# Patient Record
Sex: MALE | Race: WHITE | Employment: OTHER | URBAN - NONMETROPOLITAN AREA
[De-identification: names, ages, dates, MRNs, and addresses within clinical notes are randomized per-mention and may not be internally consistent; named-entity substitution may affect disease eponyms.]

---

## 2017-03-27 ENCOUNTER — HOSPITAL ENCOUNTER (OUTPATIENT)
Age: 62
Discharge: HOME | End: 2017-03-27
Payer: COMMERCIAL

## 2017-03-27 LAB
ABSOLUTE EOS #: 0.2 K/UL (ref 0–0.4)
ABSOLUTE LYMPH #: 1 K/UL (ref 1–4.8)
ABSOLUTE MONO #: 0.4 K/UL (ref 0–1)
ALBUMIN SERPL-MCNC: 4.2 G/DL (ref 3.5–5.2)
ALBUMIN/GLOBULIN RATIO: 1.7 (ref 1–2.5)
ALP BLD-CCNC: 80 U/L (ref 40–129)
ALT SERPL-CCNC: 54 U/L (ref 5–41)
ANION GAP SERPL CALCULATED.3IONS-SCNC: 11 MMOL/L (ref 9–17)
AST SERPL-CCNC: 42 U/L
BASOPHILS # BLD: 1 % (ref 0–2)
BASOPHILS ABSOLUTE: 0 K/UL (ref 0–0.2)
BILIRUB SERPL-MCNC: 0.69 MG/DL (ref 0.3–1.2)
BUN BLDV-MCNC: 10 MG/DL (ref 8–23)
BUN/CREAT BLD: 13 (ref 9–20)
CALCIUM SERPL-MCNC: 9.2 MG/DL (ref 8.6–10.4)
CHLORIDE BLD-SCNC: 100 MMOL/L (ref 98–107)
CHOLESTEROL/HDL RATIO: 2.9
CHOLESTEROL: 160 MG/DL
CO2: 29 MMOL/L (ref 20–31)
CREAT SERPL-MCNC: 0.75 MG/DL (ref 0.7–1.2)
DIFFERENTIAL TYPE: ABNORMAL
EOSINOPHILS RELATIVE PERCENT: 3 % (ref 0–8)
GFR AFRICAN AMERICAN: >60 ML/MIN
GFR NON-AFRICAN AMERICAN: >60 ML/MIN
GFR SERPL CREATININE-BSD FRML MDRD: ABNORMAL ML/MIN/{1.73_M2}
GFR SERPL CREATININE-BSD FRML MDRD: ABNORMAL ML/MIN/{1.73_M2}
GLUCOSE BLD-MCNC: 117 MG/DL (ref 70–99)
HCT VFR BLD CALC: 44.5 % (ref 41–53)
HDLC SERPL-MCNC: 56 MG/DL
HEMOGLOBIN: 14.9 G/DL (ref 13.5–17)
LDL CHOLESTEROL: 92 MG/DL (ref 0–130)
LYMPHOCYTES # BLD: 20 % (ref 24–44)
MCH RBC QN AUTO: 31 PG (ref 26–34)
MCHC RBC AUTO-ENTMCNC: 33.4 G/DL (ref 31–37)
MCV RBC AUTO: 93 FL (ref 80–100)
MONOCYTES # BLD: 8 % (ref 0–12)
PDW BLD-RTO: 14.3 % (ref 12.1–15.2)
PLATELET # BLD: 181 K/UL (ref 140–450)
PLATELET ESTIMATE: ABNORMAL
PMV BLD AUTO: 8.2 FL (ref 6–12)
POTASSIUM SERPL-SCNC: 3.7 MMOL/L (ref 3.7–5.3)
PROSTATE SPECIFIC ANTIGEN: 5.7 UG/L
RBC # BLD: 4.79 M/UL (ref 4.5–5.9)
RBC # BLD: ABNORMAL 10*6/UL
SEG NEUTROPHILS: 68 % (ref 36–66)
SEGMENTED NEUTROPHILS ABSOLUTE COUNT: 3.4 K/UL (ref 1.8–7.7)
SODIUM BLD-SCNC: 140 MMOL/L (ref 135–144)
TOTAL PROTEIN: 6.7 G/DL (ref 6.4–8.3)
TRIGL SERPL-MCNC: 59 MG/DL
VLDLC SERPL CALC-MCNC: NORMAL MG/DL (ref 1–30)
WBC # BLD: 5 K/UL (ref 3.5–11)
WBC # BLD: ABNORMAL 10*3/UL

## 2017-03-27 PROCEDURE — 84153 ASSAY OF PSA TOTAL: CPT

## 2017-03-27 PROCEDURE — 80061 LIPID PANEL: CPT

## 2017-03-27 PROCEDURE — 85025 COMPLETE CBC W/AUTO DIFF WBC: CPT

## 2017-03-27 PROCEDURE — 80053 COMPREHEN METABOLIC PANEL: CPT

## 2017-03-27 PROCEDURE — 36415 COLL VENOUS BLD VENIPUNCTURE: CPT

## 2017-04-17 ENCOUNTER — OFFICE VISIT (OUTPATIENT)
Dept: UROLOGY | Age: 62
End: 2017-04-17
Payer: COMMERCIAL

## 2017-04-17 VITALS
DIASTOLIC BLOOD PRESSURE: 74 MMHG | SYSTOLIC BLOOD PRESSURE: 118 MMHG | HEIGHT: 69 IN | BODY MASS INDEX: 39.84 KG/M2 | WEIGHT: 269 LBS

## 2017-04-17 DIAGNOSIS — R97.20 ELEVATED PSA: Primary | ICD-10-CM

## 2017-04-17 PROCEDURE — 99214 OFFICE O/P EST MOD 30 MIN: CPT | Performed by: UROLOGY

## 2017-04-17 RX ORDER — CIPROFLOXACIN 500 MG/1
500 TABLET, FILM COATED ORAL 2 TIMES DAILY
Qty: 28 TABLET | Refills: 0 | Status: SHIPPED | OUTPATIENT
Start: 2017-04-17 | End: 2017-05-01

## 2017-04-17 ASSESSMENT — ENCOUNTER SYMPTOMS
EYE PAIN: 0
ABDOMINAL PAIN: 0
WHEEZING: 0
BACK PAIN: 0
VOMITING: 0
COUGH: 0
SHORTNESS OF BREATH: 0
COLOR CHANGE: 0
NAUSEA: 0
EYE REDNESS: 0

## 2017-05-04 ENCOUNTER — HOSPITAL ENCOUNTER (OUTPATIENT)
Age: 62
Discharge: HOME | End: 2017-05-04
Payer: COMMERCIAL

## 2017-05-04 DIAGNOSIS — R97.20 ELEVATED PSA: ICD-10-CM

## 2017-05-04 LAB — PROSTATE SPECIFIC ANTIGEN: 4.72 UG/L

## 2017-05-04 PROCEDURE — 84153 ASSAY OF PSA TOTAL: CPT

## 2017-05-04 PROCEDURE — 36415 COLL VENOUS BLD VENIPUNCTURE: CPT

## 2017-05-11 ENCOUNTER — OFFICE VISIT (OUTPATIENT)
Dept: UROLOGY | Age: 62
End: 2017-05-11
Payer: COMMERCIAL

## 2017-05-11 VITALS
SYSTOLIC BLOOD PRESSURE: 122 MMHG | HEIGHT: 69 IN | BODY MASS INDEX: 39.69 KG/M2 | WEIGHT: 268 LBS | DIASTOLIC BLOOD PRESSURE: 80 MMHG

## 2017-05-11 DIAGNOSIS — R35.1 NOCTURIA: ICD-10-CM

## 2017-05-11 DIAGNOSIS — R97.20 ELEVATED PSA: Primary | ICD-10-CM

## 2017-05-11 PROCEDURE — 99213 OFFICE O/P EST LOW 20 MIN: CPT | Performed by: UROLOGY

## 2017-05-11 ASSESSMENT — ENCOUNTER SYMPTOMS
NAUSEA: 0
EYE PAIN: 0
EYE REDNESS: 0
COLOR CHANGE: 0
BACK PAIN: 0
COUGH: 0
WHEEZING: 0
ABDOMINAL PAIN: 0
VOMITING: 0
SHORTNESS OF BREATH: 0

## 2017-08-07 ENCOUNTER — HOSPITAL ENCOUNTER (OUTPATIENT)
Age: 62
Discharge: HOME | End: 2017-08-07
Payer: COMMERCIAL

## 2017-08-07 LAB
ABSOLUTE EOS #: 0.2 K/UL (ref 0–0.4)
ABSOLUTE LYMPH #: 1.2 K/UL (ref 1–4.8)
ABSOLUTE MONO #: 0.4 K/UL (ref 0.2–0.8)
ALBUMIN SERPL-MCNC: 4.1 G/DL (ref 3.5–5.2)
ALBUMIN/GLOBULIN RATIO: 1.6 (ref 1–2.5)
ALP BLD-CCNC: 72 U/L (ref 40–129)
ALT SERPL-CCNC: 41 U/L (ref 5–41)
ANION GAP SERPL CALCULATED.3IONS-SCNC: 12 MMOL/L (ref 9–17)
AST SERPL-CCNC: 31 U/L
BASOPHILS # BLD: 2 %
BASOPHILS ABSOLUTE: 0.1 K/UL (ref 0–0.2)
BILIRUB SERPL-MCNC: 0.77 MG/DL (ref 0.3–1.2)
BUN BLDV-MCNC: 11 MG/DL (ref 8–23)
BUN/CREAT BLD: 15 (ref 9–20)
CALCIUM SERPL-MCNC: 9.4 MG/DL (ref 8.6–10.4)
CHLORIDE BLD-SCNC: 99 MMOL/L (ref 98–107)
CHOLESTEROL/HDL RATIO: 2.7
CHOLESTEROL: 150 MG/DL
CO2: 27 MMOL/L (ref 20–31)
CREAT SERPL-MCNC: 0.74 MG/DL (ref 0.7–1.2)
DIFFERENTIAL TYPE: NORMAL
EOSINOPHILS RELATIVE PERCENT: 4 %
ESTIMATED AVERAGE GLUCOSE: 111 MG/DL
GFR AFRICAN AMERICAN: >60 ML/MIN
GFR NON-AFRICAN AMERICAN: >60 ML/MIN
GFR SERPL CREATININE-BSD FRML MDRD: ABNORMAL ML/MIN/{1.73_M2}
GFR SERPL CREATININE-BSD FRML MDRD: ABNORMAL ML/MIN/{1.73_M2}
GLUCOSE BLD-MCNC: 112 MG/DL (ref 70–99)
HBA1C MFR BLD: 5.5 % (ref 4.8–5.9)
HCT VFR BLD CALC: 45.3 % (ref 41–53)
HDLC SERPL-MCNC: 56 MG/DL
HEMOGLOBIN: 15.1 G/DL (ref 13.5–17)
LDL CHOLESTEROL: 77 MG/DL (ref 0–130)
LYMPHOCYTES # BLD: 26 %
MCH RBC QN AUTO: 30.5 PG (ref 26–34)
MCHC RBC AUTO-ENTMCNC: 33.3 G/DL (ref 31–37)
MCV RBC AUTO: 91.4 FL (ref 80–100)
MONOCYTES # BLD: 9 %
PDW BLD-RTO: 13.2 % (ref 12.1–15.2)
PLATELET # BLD: 189 K/UL (ref 140–450)
PLATELET ESTIMATE: NORMAL
PMV BLD AUTO: 8.2 FL (ref 6–12)
POTASSIUM SERPL-SCNC: 3.8 MMOL/L (ref 3.7–5.3)
RBC # BLD: 4.95 M/UL (ref 4.5–5.9)
RBC # BLD: NORMAL 10*6/UL
SEG NEUTROPHILS: 59 %
SEGMENTED NEUTROPHILS ABSOLUTE COUNT: 2.6 K/UL (ref 1.8–7.7)
SODIUM BLD-SCNC: 138 MMOL/L (ref 135–144)
TOTAL PROTEIN: 6.6 G/DL (ref 6.4–8.3)
TRIGL SERPL-MCNC: 85 MG/DL
VLDLC SERPL CALC-MCNC: NORMAL MG/DL (ref 1–30)
WBC # BLD: 4.5 K/UL (ref 3.5–11)
WBC # BLD: NORMAL 10*3/UL

## 2017-08-07 PROCEDURE — 36415 COLL VENOUS BLD VENIPUNCTURE: CPT

## 2017-08-07 PROCEDURE — 85025 COMPLETE CBC W/AUTO DIFF WBC: CPT

## 2017-08-07 PROCEDURE — 80053 COMPREHEN METABOLIC PANEL: CPT

## 2017-08-07 PROCEDURE — 83036 HEMOGLOBIN GLYCOSYLATED A1C: CPT

## 2017-08-07 PROCEDURE — 80061 LIPID PANEL: CPT

## 2017-11-09 ENCOUNTER — HOSPITAL ENCOUNTER (OUTPATIENT)
Age: 62
Discharge: HOME | End: 2017-11-09
Payer: COMMERCIAL

## 2017-11-09 DIAGNOSIS — R97.20 ELEVATED PSA: ICD-10-CM

## 2017-11-09 LAB — PROSTATE SPECIFIC ANTIGEN: 3.54 UG/L

## 2017-11-09 PROCEDURE — 84153 ASSAY OF PSA TOTAL: CPT

## 2017-11-09 PROCEDURE — 36415 COLL VENOUS BLD VENIPUNCTURE: CPT

## 2017-11-13 ENCOUNTER — OFFICE VISIT (OUTPATIENT)
Dept: UROLOGY | Age: 62
End: 2017-11-13
Payer: COMMERCIAL

## 2017-11-13 VITALS
HEIGHT: 69 IN | BODY MASS INDEX: 38.66 KG/M2 | WEIGHT: 261 LBS | SYSTOLIC BLOOD PRESSURE: 124 MMHG | DIASTOLIC BLOOD PRESSURE: 78 MMHG

## 2017-11-13 DIAGNOSIS — R97.20 ELEVATED PSA: Primary | ICD-10-CM

## 2017-11-13 PROCEDURE — 99213 OFFICE O/P EST LOW 20 MIN: CPT | Performed by: NURSE PRACTITIONER

## 2017-11-13 ASSESSMENT — ENCOUNTER SYMPTOMS
CONSTIPATION: 0
VOMITING: 0
SHORTNESS OF BREATH: 0
NAUSEA: 0
BACK PAIN: 0
COLOR CHANGE: 0
COUGH: 0
ABDOMINAL PAIN: 0
EYE REDNESS: 0
EYE PAIN: 0
WHEEZING: 0

## 2017-11-13 NOTE — PROGRESS NOTES
Subjective:      Patient ID: Sonia Rosado is a 57 y.o. male. HPI  Patient is a 57 y.o. male in no acute distress and alert and oriented to person, place and time. History  Referred for elevated PSA 5.35 on 1/6/16. 1 week prior to getting the PSA he fell and broke his tailbone. Has no family history of prostate cancer. Has minimal LUTS - only nocturia x 1 and this is not bothersome. PSA  3.54 - 11/2017  4.72 - 5/2017  5.70 - 3/2017  3.67 - 5/2016  5.35 - 1/2016  1.71 - 9/2013      Today  Here today for a 6 month follow-up for history of elevated PSA. He did have a PSA completed on 11/9/17 was 3.54. This has decreased from 4.72 6 months ago. He denies unintentional weight loss, decreased energy or appetite, night sweats, new or worsening bone/hip/back pain. He has minimal LUTS. Denies dysuria or gross hematuria. IPSS Questionnaire (AUA-7): Over the past month    1)  How often have you had a sensation of not emptying your bladder completely after you finish urinating? 0 - Not at all   2)  How often have you had to urinate again less than two hours after you finished urinating? 0 - Not at all   3)  How often have you found you stopped and started again several times when you urinated? 1 - Less than 1 time in 5   4) How difficult have you found it to postpone urination? 1 - Less than 1 time in 5   5) How often have you had a weak urinary stream?  0 - Not at all   6) How often have you had to push or strain to begin urination? 0 - Not at all   7) How many times did you most typically get up to urinate from the time you went to bed until the time you got up in the morning?   1 - 1 time   Total 3   QOL 0       Past Medical History:   Diagnosis Date    CAD (coronary artery disease)     H/O blood clots     25 yrs ago; R lower leg    Hyperlipidemia     Hypertension      Past Surgical History:   Procedure Laterality Date    CARDIAC SURGERY      4 stents placed    KNEE SURGERY      SHOULDER SURGERY Family History   Problem Relation Age of Onset    Kidney Disease Father      Current Outpatient Prescriptions on File Prior to Visit   Medication Sig Dispense Refill    lisinopril (PRINIVIL;ZESTRIL) 10 MG tablet Take 10 mg by mouth daily      atorvastatin (LIPITOR) 80 MG tablet Take 80 mg by mouth daily      hydrochlorothiazide (HYDRODIURIL) 25 MG tablet Take 25 mg by mouth daily.  clopidogrel (PLAVIX) 75 MG tablet Take 75 mg by mouth daily.  aspirin 81 MG EC tablet Take 81 mg by mouth daily.  nitroGLYCERIN (NITROLINGUAL) 0.4 MG/SPRAY spray Place 2 sprays under the tongue every 5 minutes as needed.  albuterol (PROVENTIL HFA;VENTOLIN HFA) 108 (90 BASE) MCG/ACT inhaler Inhale 2 puffs into the lungs every 6 hours as needed for Wheezing or Shortness of Breath for 30 days. 1 Inhaler 11     No current facility-administered medications on file prior to visit. Outpatient Encounter Prescriptions as of 11/13/2017   Medication Sig Dispense Refill    lisinopril (PRINIVIL;ZESTRIL) 10 MG tablet Take 10 mg by mouth daily      atorvastatin (LIPITOR) 80 MG tablet Take 80 mg by mouth daily      hydrochlorothiazide (HYDRODIURIL) 25 MG tablet Take 25 mg by mouth daily.  clopidogrel (PLAVIX) 75 MG tablet Take 75 mg by mouth daily.  aspirin 81 MG EC tablet Take 81 mg by mouth daily.  nitroGLYCERIN (NITROLINGUAL) 0.4 MG/SPRAY spray Place 2 sprays under the tongue every 5 minutes as needed.  albuterol (PROVENTIL HFA;VENTOLIN HFA) 108 (90 BASE) MCG/ACT inhaler Inhale 2 puffs into the lungs every 6 hours as needed for Wheezing or Shortness of Breath for 30 days. 1 Inhaler 11     No facility-administered encounter medications on file as of 11/13/2017. Review of patient's allergies indicates no known allergies.   History   Smoking Status    Former Smoker   Smokeless Tobacco    Never Used       History   Alcohol Use    Yes     Comment: occas       Vitals:    11/13/17 0957   BP: 124/78       Constitutional: Patient in no acute distress; Neuro: alert and oriented to person place and time. Psych: Mood and affect normal.  Skin: Normal  Lungs: Respiratory effort normal  Cardiovascular:  Normal peripheral pulses  Abdomen: Soft, non-tender, non-distended with no CVA, flank pain, hepatosplenomegaly or hernia. Kidneys normal.  Bladder non-tender and not distended. Lymphatics: no palpable lymphadenopathy  Penis normal  Urethral meatus normal  Scrotal exam normal  Testicles normal bilaterally      Lab Results   Component Value Date    PSA 3.54 11/09/2017    PSA 4.72 (H) 05/04/2017    PSA 5.70 (H) 03/27/2017     Lab Results   Component Value Date    BUN 11 08/07/2017     Lab Results   Component Value Date    CREATININE 0.74 08/07/2017       No results found for this visit on 11/13/17. Review of Systems   Constitutional: Negative for appetite change, chills and fever. Eyes: Negative for pain, redness and visual disturbance. Respiratory: Negative for cough, shortness of breath and wheezing. Cardiovascular: Negative for chest pain and leg swelling. Gastrointestinal: Negative for abdominal pain, constipation, nausea and vomiting. Genitourinary: Negative for decreased urine volume, difficulty urinating, discharge, dysuria, enuresis, flank pain, frequency, hematuria, penile pain, scrotal swelling, testicular pain and urgency. Musculoskeletal: Negative for back pain, joint swelling and myalgias. Skin: Negative for color change, rash and wound. Neurological: Negative for dizziness, tremors and numbness. Hematological: Negative for adenopathy. Does not bruise/bleed easily. Objective:   Physical Exam    Assessment:       1. Elevated PSA  PSA, Diagnostic    PSA, Diagnostic           Plan: We will repeat a PSA in 6 months, and we will call him with these results. If the PSA is elevated again we will treat with a course of antibiotics then repeat the PSA.   He will follow up with us in the office in 1 year with a PSA prior.

## 2018-02-06 ENCOUNTER — HOSPITAL ENCOUNTER (OUTPATIENT)
Age: 63
Discharge: HOME | End: 2018-02-06
Payer: COMMERCIAL

## 2018-02-06 LAB
ABSOLUTE EOS #: 0.2 K/UL (ref 0–0.4)
ABSOLUTE IMMATURE GRANULOCYTE: NORMAL K/UL (ref 0–0.3)
ABSOLUTE LYMPH #: 1.4 K/UL (ref 1–4.8)
ABSOLUTE MONO #: 0.4 K/UL (ref 0–1)
ALBUMIN SERPL-MCNC: 4.3 G/DL (ref 3.5–5.2)
ALBUMIN/GLOBULIN RATIO: 1.7 (ref 1–2.5)
ALP BLD-CCNC: 70 U/L (ref 40–129)
ALT SERPL-CCNC: 46 U/L (ref 5–41)
ANION GAP SERPL CALCULATED.3IONS-SCNC: 14 MMOL/L (ref 9–17)
AST SERPL-CCNC: 28 U/L
BASOPHILS # BLD: 1 % (ref 0–2)
BASOPHILS ABSOLUTE: 0 K/UL (ref 0–0.2)
BILIRUB SERPL-MCNC: 0.55 MG/DL (ref 0.3–1.2)
BUN BLDV-MCNC: 12 MG/DL (ref 8–23)
BUN/CREAT BLD: 17 (ref 9–20)
CALCIUM SERPL-MCNC: 9.6 MG/DL (ref 8.6–10.4)
CHLORIDE BLD-SCNC: 97 MMOL/L (ref 98–107)
CHOLESTEROL/HDL RATIO: 3.3
CHOLESTEROL: 157 MG/DL
CO2: 29 MMOL/L (ref 20–31)
CREAT SERPL-MCNC: 0.72 MG/DL (ref 0.7–1.2)
DIFFERENTIAL TYPE: NORMAL
EOSINOPHILS RELATIVE PERCENT: 3 % (ref 0–8)
ESTIMATED AVERAGE GLUCOSE: 117 MG/DL
GFR AFRICAN AMERICAN: >60 ML/MIN
GFR NON-AFRICAN AMERICAN: >60 ML/MIN
GFR SERPL CREATININE-BSD FRML MDRD: ABNORMAL ML/MIN/{1.73_M2}
GFR SERPL CREATININE-BSD FRML MDRD: ABNORMAL ML/MIN/{1.73_M2}
GLUCOSE BLD-MCNC: 99 MG/DL (ref 70–99)
HBA1C MFR BLD: 5.7 % (ref 4.8–5.9)
HCT VFR BLD CALC: 47.2 % (ref 41–53)
HDLC SERPL-MCNC: 48 MG/DL
HEMOGLOBIN: 15.6 G/DL (ref 13.5–17)
IMMATURE GRANULOCYTES: NORMAL %
LDL CHOLESTEROL: 78 MG/DL (ref 0–130)
LYMPHOCYTES # BLD: 26 % (ref 24–44)
MCH RBC QN AUTO: 30.9 PG (ref 26–34)
MCHC RBC AUTO-ENTMCNC: 33.2 G/DL (ref 31–37)
MCV RBC AUTO: 93.1 FL (ref 80–100)
MONOCYTES # BLD: 8 % (ref 0–12)
NRBC AUTOMATED: NORMAL PER 100 WBC
PDW BLD-RTO: 14.2 % (ref 12.1–15.2)
PLATELET # BLD: 191 K/UL (ref 140–450)
PLATELET ESTIMATE: NORMAL
PMV BLD AUTO: 8.2 FL (ref 6–12)
POTASSIUM SERPL-SCNC: 4.3 MMOL/L (ref 3.7–5.3)
RBC # BLD: 5.07 M/UL (ref 4.5–5.9)
RBC # BLD: NORMAL 10*6/UL
SEG NEUTROPHILS: 62 % (ref 36–66)
SEGMENTED NEUTROPHILS ABSOLUTE COUNT: 3.2 K/UL (ref 1.8–7.7)
SODIUM BLD-SCNC: 140 MMOL/L (ref 135–144)
TOTAL PROTEIN: 6.8 G/DL (ref 6.4–8.3)
TRIGL SERPL-MCNC: 157 MG/DL
VLDLC SERPL CALC-MCNC: ABNORMAL MG/DL (ref 1–30)
WBC # BLD: 5.2 K/UL (ref 3.5–11)
WBC # BLD: NORMAL 10*3/UL

## 2018-02-06 PROCEDURE — 36415 COLL VENOUS BLD VENIPUNCTURE: CPT

## 2018-02-06 PROCEDURE — 80061 LIPID PANEL: CPT

## 2018-02-06 PROCEDURE — 83036 HEMOGLOBIN GLYCOSYLATED A1C: CPT

## 2018-02-06 PROCEDURE — 85025 COMPLETE CBC W/AUTO DIFF WBC: CPT

## 2018-02-06 PROCEDURE — 80053 COMPREHEN METABOLIC PANEL: CPT

## 2018-04-03 ENCOUNTER — HOSPITAL ENCOUNTER (OUTPATIENT)
Dept: GENERAL RADIOLOGY | Age: 63
Discharge: HOME | End: 2018-04-05
Payer: COMMERCIAL

## 2018-04-03 ENCOUNTER — HOSPITAL ENCOUNTER (OUTPATIENT)
Age: 63
Discharge: HOME | End: 2018-04-05
Payer: COMMERCIAL

## 2018-04-03 DIAGNOSIS — R52 PAIN: ICD-10-CM

## 2018-04-03 PROCEDURE — 73630 X-RAY EXAM OF FOOT: CPT

## 2018-04-15 ENCOUNTER — HOSPITAL ENCOUNTER (OUTPATIENT)
Age: 63
Setting detail: OBSERVATION
Discharge: ANOTHER HOSPITAL | End: 2018-04-17
Attending: EMERGENCY MEDICINE | Admitting: INTERNAL MEDICINE
Payer: COMMERCIAL

## 2018-04-15 ENCOUNTER — APPOINTMENT (OUTPATIENT)
Dept: GENERAL RADIOLOGY | Age: 63
End: 2018-04-15
Payer: COMMERCIAL

## 2018-04-15 DIAGNOSIS — Z86.718 H/O BLOOD CLOTS: ICD-10-CM

## 2018-04-15 DIAGNOSIS — I20.0 UNSTABLE ANGINA (HCC): Primary | ICD-10-CM

## 2018-04-15 LAB
% CKMB: 1.6 % (ref 0–3.5)
% CKMB: 2.3 % (ref 0–3.5)
ABSOLUTE EOS #: 0.19 K/UL (ref 0–0.44)
ABSOLUTE IMMATURE GRANULOCYTE: <0.03 K/UL (ref 0–0.3)
ABSOLUTE LYMPH #: 1.69 K/UL (ref 1.1–3.7)
ABSOLUTE MONO #: 0.7 K/UL (ref 0.1–1.2)
ANION GAP SERPL CALCULATED.3IONS-SCNC: 11 MMOL/L (ref 9–17)
BASOPHILS # BLD: 1 % (ref 0–2)
BASOPHILS ABSOLUTE: 0.06 K/UL (ref 0–0.2)
BNP INTERPRETATION: NORMAL
BUN BLDV-MCNC: 15 MG/DL (ref 8–23)
BUN/CREAT BLD: 20 (ref 9–20)
CALCIUM SERPL-MCNC: 9.5 MG/DL (ref 8.6–10.4)
CHLORIDE BLD-SCNC: 98 MMOL/L (ref 98–107)
CK MB: 2.1 NG/ML
CK MB: 2.3 NG/ML
CKMB INTERPRETATION: NORMAL
CKMB INTERPRETATION: NORMAL
CO2: 29 MMOL/L (ref 20–31)
CREAT SERPL-MCNC: 0.76 MG/DL (ref 0.7–1.2)
DIFFERENTIAL TYPE: NORMAL
EKG ATRIAL RATE: 67 BPM
EKG ATRIAL RATE: 75 BPM
EKG P AXIS: 25 DEGREES
EKG P AXIS: 62 DEGREES
EKG P-R INTERVAL: 154 MS
EKG P-R INTERVAL: 162 MS
EKG Q-T INTERVAL: 408 MS
EKG Q-T INTERVAL: 418 MS
EKG QRS DURATION: 110 MS
EKG QRS DURATION: 114 MS
EKG QTC CALCULATION (BAZETT): 431 MS
EKG QTC CALCULATION (BAZETT): 466 MS
EKG R AXIS: -45 DEGREES
EKG R AXIS: -50 DEGREES
EKG T AXIS: 37 DEGREES
EKG VENTRICULAR RATE: 67 BPM
EKG VENTRICULAR RATE: 75 BPM
EOSINOPHILS RELATIVE PERCENT: 3 % (ref 1–4)
GFR AFRICAN AMERICAN: >60 ML/MIN
GFR NON-AFRICAN AMERICAN: >60 ML/MIN
GFR SERPL CREATININE-BSD FRML MDRD: ABNORMAL ML/MIN/{1.73_M2}
GFR SERPL CREATININE-BSD FRML MDRD: ABNORMAL ML/MIN/{1.73_M2}
GLUCOSE BLD-MCNC: 102 MG/DL (ref 70–99)
HCT VFR BLD CALC: 46 % (ref 40.7–50.3)
HEMOGLOBIN: 15.4 G/DL (ref 13–17)
IMMATURE GRANULOCYTES: 0 %
INR BLD: 1 (ref 0.9–1.2)
LYMPHOCYTES # BLD: 28 % (ref 24–43)
MCH RBC QN AUTO: 31.2 PG (ref 25.2–33.5)
MCHC RBC AUTO-ENTMCNC: 33.5 G/DL (ref 28.4–34.8)
MCV RBC AUTO: 93.1 FL (ref 82.6–102.9)
MONOCYTES # BLD: 12 % (ref 3–12)
NRBC AUTOMATED: 0 PER 100 WBC
PARTIAL THROMBOPLASTIN TIME: 28.5 SEC (ref 23.2–34.4)
PDW BLD-RTO: 14.2 % (ref 11.8–14.4)
PLATELET # BLD: 173 K/UL (ref 138–453)
PLATELET ESTIMATE: NORMAL
PMV BLD AUTO: 10.1 FL (ref 8.1–13.5)
POTASSIUM SERPL-SCNC: 5.3 MMOL/L (ref 3.7–5.3)
PRO-BNP: 37 PG/ML
PROTHROMBIN TIME: 10.5 SEC (ref 9.7–12.2)
RBC # BLD: 4.94 M/UL (ref 4.21–5.77)
RBC # BLD: NORMAL 10*6/UL
SEG NEUTROPHILS: 56 % (ref 36–65)
SEGMENTED NEUTROPHILS ABSOLUTE COUNT: 3.39 K/UL (ref 1.5–8.1)
SODIUM BLD-SCNC: 138 MMOL/L (ref 135–144)
TOTAL CK: 143 U/L (ref 39–308)
TOTAL CK: 93 U/L (ref 39–308)
TROPONIN INTERP: NORMAL
TROPONIN T: <0.03 NG/ML
WBC # BLD: 6.1 K/UL (ref 3.5–11.3)
WBC # BLD: NORMAL 10*3/UL

## 2018-04-15 PROCEDURE — 84484 ASSAY OF TROPONIN QUANT: CPT

## 2018-04-15 PROCEDURE — 6370000000 HC RX 637 (ALT 250 FOR IP): Performed by: INTERNAL MEDICINE

## 2018-04-15 PROCEDURE — 85025 COMPLETE CBC W/AUTO DIFF WBC: CPT

## 2018-04-15 PROCEDURE — 99285 EMERGENCY DEPT VISIT HI MDM: CPT

## 2018-04-15 PROCEDURE — 71045 X-RAY EXAM CHEST 1 VIEW: CPT

## 2018-04-15 PROCEDURE — 93005 ELECTROCARDIOGRAM TRACING: CPT

## 2018-04-15 PROCEDURE — 83880 ASSAY OF NATRIURETIC PEPTIDE: CPT

## 2018-04-15 PROCEDURE — G0378 HOSPITAL OBSERVATION PER HR: HCPCS

## 2018-04-15 PROCEDURE — 36415 COLL VENOUS BLD VENIPUNCTURE: CPT

## 2018-04-15 PROCEDURE — 82553 CREATINE MB FRACTION: CPT

## 2018-04-15 PROCEDURE — 82550 ASSAY OF CK (CPK): CPT

## 2018-04-15 PROCEDURE — 6360000002 HC RX W HCPCS: Performed by: EMERGENCY MEDICINE

## 2018-04-15 PROCEDURE — 85610 PROTHROMBIN TIME: CPT

## 2018-04-15 PROCEDURE — 96372 THER/PROPH/DIAG INJ SC/IM: CPT

## 2018-04-15 PROCEDURE — 80048 BASIC METABOLIC PNL TOTAL CA: CPT

## 2018-04-15 PROCEDURE — 2580000003 HC RX 258: Performed by: INTERNAL MEDICINE

## 2018-04-15 PROCEDURE — 85730 THROMBOPLASTIN TIME PARTIAL: CPT

## 2018-04-15 RX ORDER — CLOPIDOGREL BISULFATE 75 MG/1
75 TABLET ORAL DAILY
Status: DISCONTINUED | OUTPATIENT
Start: 2018-04-15 | End: 2018-04-17 | Stop reason: HOSPADM

## 2018-04-15 RX ORDER — SODIUM CHLORIDE 0.9 % (FLUSH) 0.9 %
10 SYRINGE (ML) INJECTION PRN
Status: DISCONTINUED | OUTPATIENT
Start: 2018-04-15 | End: 2018-04-17

## 2018-04-15 RX ORDER — SODIUM CHLORIDE 9 MG/ML
INJECTION, SOLUTION INTRAVENOUS CONTINUOUS
Status: DISCONTINUED | OUTPATIENT
Start: 2018-04-15 | End: 2018-04-17

## 2018-04-15 RX ORDER — ONDANSETRON 2 MG/ML
4 INJECTION INTRAMUSCULAR; INTRAVENOUS EVERY 6 HOURS PRN
Status: DISCONTINUED | OUTPATIENT
Start: 2018-04-15 | End: 2018-04-17 | Stop reason: HOSPADM

## 2018-04-15 RX ORDER — ASPIRIN 81 MG/1
81 TABLET ORAL DAILY
Status: DISCONTINUED | OUTPATIENT
Start: 2018-04-15 | End: 2018-04-15 | Stop reason: SDUPTHER

## 2018-04-15 RX ORDER — HYDROCHLOROTHIAZIDE 25 MG/1
25 TABLET ORAL DAILY
Status: DISCONTINUED | OUTPATIENT
Start: 2018-04-15 | End: 2018-04-17 | Stop reason: HOSPADM

## 2018-04-15 RX ORDER — FAMOTIDINE 20 MG/1
20 TABLET, FILM COATED ORAL 2 TIMES DAILY
Status: DISCONTINUED | OUTPATIENT
Start: 2018-04-15 | End: 2018-04-17 | Stop reason: HOSPADM

## 2018-04-15 RX ORDER — NITROGLYCERIN 0.4 MG/1
0.4 TABLET SUBLINGUAL EVERY 5 MIN PRN
Status: DISCONTINUED | OUTPATIENT
Start: 2018-04-15 | End: 2018-04-15 | Stop reason: SDUPTHER

## 2018-04-15 RX ORDER — ASPIRIN 81 MG/1
81 TABLET, CHEWABLE ORAL DAILY
Status: DISCONTINUED | OUTPATIENT
Start: 2018-04-16 | End: 2018-04-17 | Stop reason: HOSPADM

## 2018-04-15 RX ORDER — SODIUM CHLORIDE 0.9 % (FLUSH) 0.9 %
10 SYRINGE (ML) INJECTION EVERY 12 HOURS SCHEDULED
Status: DISCONTINUED | OUTPATIENT
Start: 2018-04-15 | End: 2018-04-17

## 2018-04-15 RX ORDER — NITROGLYCERIN 0.4 MG/1
0.4 TABLET SUBLINGUAL EVERY 5 MIN PRN
Status: DISCONTINUED | OUTPATIENT
Start: 2018-04-15 | End: 2018-04-17

## 2018-04-15 RX ORDER — ATORVASTATIN CALCIUM 40 MG/1
80 TABLET, FILM COATED ORAL DAILY
Status: DISCONTINUED | OUTPATIENT
Start: 2018-04-15 | End: 2018-04-17 | Stop reason: HOSPADM

## 2018-04-15 RX ORDER — ACETAMINOPHEN 325 MG/1
650 TABLET ORAL EVERY 4 HOURS PRN
Status: DISCONTINUED | OUTPATIENT
Start: 2018-04-15 | End: 2018-04-17

## 2018-04-15 RX ORDER — LISINOPRIL 10 MG/1
10 TABLET ORAL DAILY
Status: DISCONTINUED | OUTPATIENT
Start: 2018-04-15 | End: 2018-04-17 | Stop reason: HOSPADM

## 2018-04-15 RX ADMIN — FAMOTIDINE 20 MG: 20 TABLET ORAL at 20:35

## 2018-04-15 RX ADMIN — SODIUM CHLORIDE: 9 INJECTION, SOLUTION INTRAVENOUS at 19:25

## 2018-04-15 RX ADMIN — ENOXAPARIN SODIUM 120 MG: 120 INJECTION SUBCUTANEOUS at 15:26

## 2018-04-15 ASSESSMENT — PAIN DESCRIPTION - PAIN TYPE: TYPE: ACUTE PAIN

## 2018-04-15 ASSESSMENT — PAIN SCALES - GENERAL
PAINLEVEL_OUTOF10: 0
PAINLEVEL_OUTOF10: 0
PAINLEVEL_OUTOF10: 7
PAINLEVEL_OUTOF10: 0
PAINLEVEL_OUTOF10: 0

## 2018-04-15 ASSESSMENT — ENCOUNTER SYMPTOMS
DIARRHEA: 0
COLOR CHANGE: 0
ABDOMINAL PAIN: 0
COUGH: 0
WHEEZING: 0
ABDOMINAL DISTENTION: 0
SHORTNESS OF BREATH: 1
FACIAL SWELLING: 0
CONSTIPATION: 0
VOMITING: 0
BACK PAIN: 0
TROUBLE SWALLOWING: 0
NAUSEA: 0

## 2018-04-15 ASSESSMENT — PAIN DESCRIPTION - DESCRIPTORS: DESCRIPTORS: HEAVINESS

## 2018-04-15 ASSESSMENT — HEART SCORE: ECG: 0

## 2018-04-15 ASSESSMENT — PAIN DESCRIPTION - ORIENTATION: ORIENTATION: MID;ANTERIOR

## 2018-04-15 ASSESSMENT — PAIN DESCRIPTION - LOCATION: LOCATION: CHEST

## 2018-04-16 ENCOUNTER — APPOINTMENT (OUTPATIENT)
Dept: NON INVASIVE DIAGNOSTICS | Age: 63
End: 2018-04-16
Payer: COMMERCIAL

## 2018-04-16 LAB
ABSOLUTE EOS #: 0.22 K/UL (ref 0–0.44)
ABSOLUTE IMMATURE GRANULOCYTE: <0.03 K/UL (ref 0–0.3)
ABSOLUTE LYMPH #: 1.53 K/UL (ref 1.1–3.7)
ABSOLUTE MONO #: 0.53 K/UL (ref 0.1–1.2)
BASOPHILS # BLD: 1 % (ref 0–2)
BASOPHILS ABSOLUTE: 0.06 K/UL (ref 0–0.2)
CHOLESTEROL/HDL RATIO: 3.5
CHOLESTEROL: 163 MG/DL
DIFFERENTIAL TYPE: NORMAL
EKG ATRIAL RATE: 64 BPM
EKG P AXIS: 15 DEGREES
EKG P-R INTERVAL: 162 MS
EKG Q-T INTERVAL: 464 MS
EKG QRS DURATION: 116 MS
EKG QTC CALCULATION (BAZETT): 478 MS
EKG R AXIS: -36 DEGREES
EKG T AXIS: 7 DEGREES
EKG VENTRICULAR RATE: 64 BPM
EOSINOPHILS RELATIVE PERCENT: 4 % (ref 1–4)
HCT VFR BLD CALC: 41.9 % (ref 40.7–50.3)
HDLC SERPL-MCNC: 46 MG/DL
HEMOGLOBIN: 13.9 G/DL (ref 13–17)
IMMATURE GRANULOCYTES: 0 %
LDL CHOLESTEROL: 91 MG/DL (ref 0–130)
LV EF: 55 %
LVEF MODALITY: NORMAL
LYMPHOCYTES # BLD: 29 % (ref 24–43)
MCH RBC QN AUTO: 31.4 PG (ref 25.2–33.5)
MCHC RBC AUTO-ENTMCNC: 33.2 G/DL (ref 28.4–34.8)
MCV RBC AUTO: 94.6 FL (ref 82.6–102.9)
MONOCYTES # BLD: 10 % (ref 3–12)
NRBC AUTOMATED: 0 PER 100 WBC
PDW BLD-RTO: 14 % (ref 11.8–14.4)
PLATELET # BLD: 141 K/UL (ref 138–453)
PLATELET ESTIMATE: NORMAL
PMV BLD AUTO: 9.9 FL (ref 8.1–13.5)
RBC # BLD: 4.43 M/UL (ref 4.21–5.77)
RBC # BLD: NORMAL 10*6/UL
SEG NEUTROPHILS: 56 % (ref 36–65)
SEGMENTED NEUTROPHILS ABSOLUTE COUNT: 2.85 K/UL (ref 1.5–8.1)
TRIGL SERPL-MCNC: 129 MG/DL
VLDLC SERPL CALC-MCNC: NORMAL MG/DL (ref 1–30)
WBC # BLD: 5.2 K/UL (ref 3.5–11.3)
WBC # BLD: NORMAL 10*3/UL

## 2018-04-16 PROCEDURE — A9500 TC99M SESTAMIBI: HCPCS | Performed by: INTERNAL MEDICINE

## 2018-04-16 PROCEDURE — 93306 TTE W/DOPPLER COMPLETE: CPT

## 2018-04-16 PROCEDURE — 93017 CV STRESS TEST TRACING ONLY: CPT

## 2018-04-16 PROCEDURE — 85025 COMPLETE CBC W/AUTO DIFF WBC: CPT

## 2018-04-16 PROCEDURE — 3430000000 HC RX DIAGNOSTIC RADIOPHARMACEUTICAL: Performed by: INTERNAL MEDICINE

## 2018-04-16 PROCEDURE — 36415 COLL VENOUS BLD VENIPUNCTURE: CPT

## 2018-04-16 PROCEDURE — 6370000000 HC RX 637 (ALT 250 FOR IP): Performed by: INTERNAL MEDICINE

## 2018-04-16 PROCEDURE — G0378 HOSPITAL OBSERVATION PER HR: HCPCS

## 2018-04-16 PROCEDURE — 6360000002 HC RX W HCPCS: Performed by: INTERNAL MEDICINE

## 2018-04-16 PROCEDURE — 99244 OFF/OP CNSLTJ NEW/EST MOD 40: CPT | Performed by: INTERNAL MEDICINE

## 2018-04-16 PROCEDURE — 2580000003 HC RX 258: Performed by: INTERNAL MEDICINE

## 2018-04-16 PROCEDURE — 80061 LIPID PANEL: CPT

## 2018-04-16 PROCEDURE — 96372 THER/PROPH/DIAG INJ SC/IM: CPT

## 2018-04-16 PROCEDURE — 78452 HT MUSCLE IMAGE SPECT MULT: CPT

## 2018-04-16 RX ORDER — ISOSORBIDE MONONITRATE 30 MG/1
30 TABLET, EXTENDED RELEASE ORAL DAILY
Status: DISCONTINUED | OUTPATIENT
Start: 2018-04-16 | End: 2018-04-17 | Stop reason: HOSPADM

## 2018-04-16 RX ADMIN — ENOXAPARIN SODIUM 30 MG: 30 INJECTION SUBCUTANEOUS at 20:18

## 2018-04-16 RX ADMIN — ENOXAPARIN SODIUM 30 MG: 30 INJECTION SUBCUTANEOUS at 08:44

## 2018-04-16 RX ADMIN — Medication 10 ML: at 08:44

## 2018-04-16 RX ADMIN — Medication 30 MILLICURIE: at 09:53

## 2018-04-16 RX ADMIN — HYDROCHLOROTHIAZIDE 25 MG: 25 TABLET ORAL at 08:44

## 2018-04-16 RX ADMIN — LISINOPRIL 10 MG: 10 TABLET ORAL at 08:44

## 2018-04-16 RX ADMIN — FAMOTIDINE 20 MG: 20 TABLET ORAL at 08:44

## 2018-04-16 RX ADMIN — ISOSORBIDE MONONITRATE 30 MG: 30 TABLET, EXTENDED RELEASE ORAL at 18:29

## 2018-04-16 RX ADMIN — ASPIRIN 81 MG 81 MG: 81 TABLET ORAL at 08:44

## 2018-04-16 RX ADMIN — ATORVASTATIN CALCIUM 80 MG: 40 TABLET, FILM COATED ORAL at 08:44

## 2018-04-16 RX ADMIN — SODIUM CHLORIDE: 9 INJECTION, SOLUTION INTRAVENOUS at 18:57

## 2018-04-16 RX ADMIN — Medication 10 MILLICURIE: at 08:20

## 2018-04-16 RX ADMIN — CLOPIDOGREL 75 MG: 75 TABLET, FILM COATED ORAL at 08:44

## 2018-04-16 RX ADMIN — FAMOTIDINE 20 MG: 20 TABLET ORAL at 20:18

## 2018-04-16 ASSESSMENT — PAIN SCALES - GENERAL
PAINLEVEL_OUTOF10: 0

## 2018-04-17 ENCOUNTER — HOSPITAL ENCOUNTER (OUTPATIENT)
Dept: CARDIAC CATH/INVASIVE PROCEDURES | Age: 63
Discharge: HOME HEALTH | End: 2018-04-18
Attending: INTERNAL MEDICINE | Admitting: INTERNAL MEDICINE
Payer: COMMERCIAL

## 2018-04-17 VITALS
WEIGHT: 275.1 LBS | BODY MASS INDEX: 40.74 KG/M2 | OXYGEN SATURATION: 100 % | SYSTOLIC BLOOD PRESSURE: 142 MMHG | HEIGHT: 69 IN | RESPIRATION RATE: 14 BRPM | DIASTOLIC BLOOD PRESSURE: 85 MMHG | HEART RATE: 59 BPM | TEMPERATURE: 97.9 F

## 2018-04-17 DIAGNOSIS — Z95.5 S/P DRUG ELUTING CORONARY STENT PLACEMENT: ICD-10-CM

## 2018-04-17 LAB
ACTIVATED CLOTTING TIME: 200 SEC (ref 79–149)
HCT VFR BLD CALC: 44.1 % (ref 40.7–50.3)
HEMOGLOBIN: 14.7 G/DL (ref 13–17)
INR BLD: 1 (ref 0.9–1.2)
MCH RBC QN AUTO: 31.3 PG (ref 25.2–33.5)
MCHC RBC AUTO-ENTMCNC: 33.3 G/DL (ref 28.4–34.8)
MCV RBC AUTO: 93.8 FL (ref 82.6–102.9)
NRBC AUTOMATED: 0 PER 100 WBC
PARTIAL THROMBOPLASTIN TIME: 49 SEC (ref 23.2–34.4)
PDW BLD-RTO: 13.7 % (ref 11.8–14.4)
PLATELET # BLD: 155 K/UL (ref 138–453)
PMV BLD AUTO: 9.6 FL (ref 8.1–13.5)
PROTHROMBIN TIME: 10.5 SEC (ref 9.7–12.2)
RBC # BLD: 4.7 M/UL (ref 4.21–5.77)
TROPONIN INTERP: NORMAL
TROPONIN T: <0.03 NG/ML
WBC # BLD: 6.7 K/UL (ref 3.5–11.3)

## 2018-04-17 PROCEDURE — 2580000003 HC RX 258: Performed by: INTERNAL MEDICINE

## 2018-04-17 PROCEDURE — C1894 INTRO/SHEATH, NON-LASER: HCPCS

## 2018-04-17 PROCEDURE — 7100000011 HC PHASE II RECOVERY - ADDTL 15 MIN

## 2018-04-17 PROCEDURE — 2500000003 HC RX 250 WO HCPCS

## 2018-04-17 PROCEDURE — 85610 PROTHROMBIN TIME: CPT

## 2018-04-17 PROCEDURE — 93458 L HRT ARTERY/VENTRICLE ANGIO: CPT | Performed by: FAMILY MEDICINE

## 2018-04-17 PROCEDURE — C1769 GUIDE WIRE: HCPCS

## 2018-04-17 PROCEDURE — 92928 PRQ TCAT PLMT NTRAC ST 1 LES: CPT | Performed by: INTERNAL MEDICINE

## 2018-04-17 PROCEDURE — 84484 ASSAY OF TROPONIN QUANT: CPT

## 2018-04-17 PROCEDURE — C1874 STENT, COATED/COV W/DEL SYS: HCPCS

## 2018-04-17 PROCEDURE — G0378 HOSPITAL OBSERVATION PER HR: HCPCS

## 2018-04-17 PROCEDURE — 92929 HC PRQ CARD STENT W/ANGIO ADDL: CPT | Performed by: INTERNAL MEDICINE

## 2018-04-17 PROCEDURE — 2580000003 HC RX 258: Performed by: FAMILY MEDICINE

## 2018-04-17 PROCEDURE — 85027 COMPLETE CBC AUTOMATED: CPT

## 2018-04-17 PROCEDURE — 85730 THROMBOPLASTIN TIME PARTIAL: CPT

## 2018-04-17 PROCEDURE — C1725 CATH, TRANSLUMIN NON-LASER: HCPCS

## 2018-04-17 PROCEDURE — 6360000002 HC RX W HCPCS

## 2018-04-17 PROCEDURE — C1887 CATHETER, GUIDING: HCPCS

## 2018-04-17 PROCEDURE — 6370000000 HC RX 637 (ALT 250 FOR IP)

## 2018-04-17 PROCEDURE — 2709999900 HC NON-CHARGEABLE SUPPLY

## 2018-04-17 PROCEDURE — 7100000010 HC PHASE II RECOVERY - FIRST 15 MIN

## 2018-04-17 PROCEDURE — C1760 CLOSURE DEV, VASC: HCPCS

## 2018-04-17 PROCEDURE — 36415 COLL VENOUS BLD VENIPUNCTURE: CPT

## 2018-04-17 PROCEDURE — 85347 COAGULATION TIME ACTIVATED: CPT

## 2018-04-17 PROCEDURE — 6360000004 HC RX CONTRAST MEDICATION

## 2018-04-17 RX ORDER — LISINOPRIL 10 MG/1
10 TABLET ORAL DAILY
Status: DISCONTINUED | OUTPATIENT
Start: 2018-04-18 | End: 2018-04-18 | Stop reason: HOSPADM

## 2018-04-17 RX ORDER — DIPHENHYDRAMINE HCL 25 MG
50 CAPSULE ORAL ONCE
Status: DISCONTINUED | OUTPATIENT
Start: 2018-04-17 | End: 2018-04-17 | Stop reason: HOSPADM

## 2018-04-17 RX ORDER — NITROGLYCERIN 0.4 MG/1
0.4 TABLET SUBLINGUAL EVERY 5 MIN PRN
Status: DISCONTINUED | OUTPATIENT
Start: 2018-04-17 | End: 2018-04-17 | Stop reason: HOSPADM

## 2018-04-17 RX ORDER — HEPARIN SODIUM 10000 [USP'U]/100ML
12 INJECTION, SOLUTION INTRAVENOUS CONTINUOUS
Status: DISCONTINUED | OUTPATIENT
Start: 2018-04-17 | End: 2018-04-17 | Stop reason: SDUPTHER

## 2018-04-17 RX ORDER — LABETALOL HYDROCHLORIDE 5 MG/ML
10 INJECTION, SOLUTION INTRAVENOUS EVERY 30 MIN PRN
Status: DISCONTINUED | OUTPATIENT
Start: 2018-04-17 | End: 2018-04-18 | Stop reason: HOSPADM

## 2018-04-17 RX ORDER — HEPARIN SODIUM 1000 [USP'U]/ML
60 INJECTION, SOLUTION INTRAVENOUS; SUBCUTANEOUS PRN
Status: DISCONTINUED | OUTPATIENT
Start: 2018-04-17 | End: 2018-04-17 | Stop reason: HOSPADM

## 2018-04-17 RX ORDER — SODIUM CHLORIDE 0.9 % (FLUSH) 0.9 %
10 SYRINGE (ML) INJECTION PRN
Status: DISCONTINUED | OUTPATIENT
Start: 2018-04-17 | End: 2018-04-17 | Stop reason: HOSPADM

## 2018-04-17 RX ORDER — HEPARIN SODIUM 1000 [USP'U]/ML
30 INJECTION, SOLUTION INTRAVENOUS; SUBCUTANEOUS PRN
Status: DISCONTINUED | OUTPATIENT
Start: 2018-04-17 | End: 2018-04-17 | Stop reason: HOSPADM

## 2018-04-17 RX ORDER — ASPIRIN 81 MG/1
81 TABLET ORAL DAILY
Status: DISCONTINUED | OUTPATIENT
Start: 2018-04-18 | End: 2018-04-18 | Stop reason: HOSPADM

## 2018-04-17 RX ORDER — ACETAMINOPHEN 325 MG/1
650 TABLET ORAL EVERY 4 HOURS PRN
Status: DISCONTINUED | OUTPATIENT
Start: 2018-04-17 | End: 2018-04-17 | Stop reason: HOSPADM

## 2018-04-17 RX ORDER — HEPARIN SODIUM 10000 [USP'U]/100ML
12 INJECTION, SOLUTION INTRAVENOUS CONTINUOUS
Status: DISCONTINUED | OUTPATIENT
Start: 2018-04-17 | End: 2018-04-17 | Stop reason: HOSPADM

## 2018-04-17 RX ORDER — HEPARIN SODIUM AND DEXTROSE 10000; 5 [USP'U]/100ML; G/100ML
1000 INJECTION INTRAVENOUS CONTINUOUS
Status: DISCONTINUED | OUTPATIENT
Start: 2018-04-17 | End: 2018-04-18 | Stop reason: HOSPADM

## 2018-04-17 RX ORDER — ACETAMINOPHEN 325 MG/1
650 TABLET ORAL EVERY 4 HOURS PRN
Status: DISCONTINUED | OUTPATIENT
Start: 2018-04-17 | End: 2018-04-18 | Stop reason: HOSPADM

## 2018-04-17 RX ORDER — ALBUTEROL SULFATE 90 UG/1
2 AEROSOL, METERED RESPIRATORY (INHALATION) EVERY 6 HOURS PRN
Status: DISCONTINUED | OUTPATIENT
Start: 2018-04-17 | End: 2018-04-18 | Stop reason: HOSPADM

## 2018-04-17 RX ORDER — SODIUM CHLORIDE 0.9 % (FLUSH) 0.9 %
10 SYRINGE (ML) INJECTION EVERY 12 HOURS SCHEDULED
Status: DISCONTINUED | OUTPATIENT
Start: 2018-04-17 | End: 2018-04-18 | Stop reason: HOSPADM

## 2018-04-17 RX ORDER — SODIUM CHLORIDE 0.9 % (FLUSH) 0.9 %
10 SYRINGE (ML) INJECTION PRN
Status: DISCONTINUED | OUTPATIENT
Start: 2018-04-17 | End: 2018-04-18 | Stop reason: HOSPADM

## 2018-04-17 RX ORDER — SODIUM CHLORIDE 9 MG/ML
INJECTION, SOLUTION INTRAVENOUS CONTINUOUS
Status: DISCONTINUED | OUTPATIENT
Start: 2018-04-17 | End: 2018-04-17 | Stop reason: HOSPADM

## 2018-04-17 RX ORDER — SODIUM CHLORIDE 9 MG/ML
INJECTION, SOLUTION INTRAVENOUS CONTINUOUS
Status: DISCONTINUED | OUTPATIENT
Start: 2018-04-17 | End: 2018-04-18 | Stop reason: HOSPADM

## 2018-04-17 RX ORDER — SODIUM CHLORIDE 0.9 % (FLUSH) 0.9 %
10 SYRINGE (ML) INJECTION EVERY 12 HOURS SCHEDULED
Status: DISCONTINUED | OUTPATIENT
Start: 2018-04-17 | End: 2018-04-17 | Stop reason: HOSPADM

## 2018-04-17 RX ORDER — NITROGLYCERIN 0.4 MG/1
0.4 TABLET SUBLINGUAL EVERY 5 MIN PRN
Status: DISCONTINUED | OUTPATIENT
Start: 2018-04-17 | End: 2018-04-18 | Stop reason: HOSPADM

## 2018-04-17 RX ORDER — ATORVASTATIN CALCIUM 80 MG/1
80 TABLET, FILM COATED ORAL DAILY
Status: DISCONTINUED | OUTPATIENT
Start: 2018-04-18 | End: 2018-04-18 | Stop reason: HOSPADM

## 2018-04-17 RX ORDER — ONDANSETRON 2 MG/ML
4 INJECTION INTRAMUSCULAR; INTRAVENOUS EVERY 6 HOURS PRN
Status: DISCONTINUED | OUTPATIENT
Start: 2018-04-17 | End: 2018-04-18 | Stop reason: HOSPADM

## 2018-04-17 RX ORDER — HYDROCHLOROTHIAZIDE 25 MG/1
25 TABLET ORAL DAILY
Status: DISCONTINUED | OUTPATIENT
Start: 2018-04-18 | End: 2018-04-18 | Stop reason: HOSPADM

## 2018-04-17 RX ADMIN — SODIUM CHLORIDE: 9 INJECTION, SOLUTION INTRAVENOUS at 07:55

## 2018-04-17 RX ADMIN — HEPARIN SODIUM AND DEXTROSE 1000 UNITS/HR: 10000; 5 INJECTION INTRAVENOUS at 12:01

## 2018-04-17 RX ADMIN — SODIUM CHLORIDE: 9 INJECTION, SOLUTION INTRAVENOUS at 21:28

## 2018-04-17 ASSESSMENT — PAIN SCALES - GENERAL
PAINLEVEL_OUTOF10: 0

## 2018-04-18 VITALS
HEART RATE: 66 BPM | TEMPERATURE: 97.5 F | RESPIRATION RATE: 18 BRPM | DIASTOLIC BLOOD PRESSURE: 78 MMHG | OXYGEN SATURATION: 96 % | SYSTOLIC BLOOD PRESSURE: 135 MMHG

## 2018-04-18 LAB
TROPONIN INTERP: NORMAL
TROPONIN T: <0.03 NG/ML

## 2018-04-18 PROCEDURE — 84484 ASSAY OF TROPONIN QUANT: CPT

## 2018-04-18 PROCEDURE — 36415 COLL VENOUS BLD VENIPUNCTURE: CPT

## 2018-04-18 PROCEDURE — 6370000000 HC RX 637 (ALT 250 FOR IP): Performed by: INTERNAL MEDICINE

## 2018-04-18 PROCEDURE — 6360000002 HC RX W HCPCS

## 2018-04-18 RX ORDER — CARVEDILOL 3.12 MG/1
3.12 TABLET ORAL 2 TIMES DAILY WITH MEALS
Status: DISCONTINUED | OUTPATIENT
Start: 2018-04-18 | End: 2018-04-18 | Stop reason: HOSPADM

## 2018-04-18 RX ORDER — CARVEDILOL 3.12 MG/1
3.12 TABLET ORAL 2 TIMES DAILY WITH MEALS
Qty: 60 TABLET | Refills: 3 | Status: SHIPPED | OUTPATIENT
Start: 2018-04-18 | End: 2018-10-01 | Stop reason: SDUPTHER

## 2018-04-18 RX ADMIN — ATORVASTATIN CALCIUM 80 MG: 80 TABLET, FILM COATED ORAL at 08:14

## 2018-04-18 RX ADMIN — HYDROCHLOROTHIAZIDE 25 MG: 25 TABLET ORAL at 08:14

## 2018-04-18 RX ADMIN — ASPIRIN 81 MG: 81 TABLET, COATED ORAL at 08:14

## 2018-04-18 RX ADMIN — TICAGRELOR 90 MG: 90 TABLET ORAL at 08:14

## 2018-04-18 RX ADMIN — LISINOPRIL 10 MG: 10 TABLET ORAL at 08:14

## 2018-05-07 ENCOUNTER — HOSPITAL ENCOUNTER (OUTPATIENT)
Dept: CARDIAC REHAB | Age: 63
Setting detail: THERAPIES SERIES
Discharge: HOME | End: 2018-05-07
Payer: COMMERCIAL

## 2018-05-07 VITALS
DIASTOLIC BLOOD PRESSURE: 68 MMHG | HEART RATE: 80 BPM | RESPIRATION RATE: 18 BRPM | OXYGEN SATURATION: 95 % | WEIGHT: 270 LBS | BODY MASS INDEX: 39.99 KG/M2 | SYSTOLIC BLOOD PRESSURE: 130 MMHG | HEIGHT: 69 IN

## 2018-05-07 ASSESSMENT — PAIN SCALES - GENERAL: PAINLEVEL_OUTOF10: 0

## 2018-05-10 ENCOUNTER — OFFICE VISIT (OUTPATIENT)
Dept: CARDIOLOGY | Age: 63
End: 2018-05-10
Payer: COMMERCIAL

## 2018-05-10 VITALS
HEIGHT: 69 IN | BODY MASS INDEX: 33.47 KG/M2 | WEIGHT: 226 LBS | SYSTOLIC BLOOD PRESSURE: 114 MMHG | RESPIRATION RATE: 20 BRPM | DIASTOLIC BLOOD PRESSURE: 71 MMHG | HEART RATE: 61 BPM | OXYGEN SATURATION: 97 %

## 2018-05-10 DIAGNOSIS — I25.110 CORONARY ARTERY DISEASE INVOLVING NATIVE CORONARY ARTERY OF NATIVE HEART WITH UNSTABLE ANGINA PECTORIS (HCC): Chronic | ICD-10-CM

## 2018-05-10 DIAGNOSIS — G47.33 OSA (OBSTRUCTIVE SLEEP APNEA): ICD-10-CM

## 2018-05-10 DIAGNOSIS — E78.2 MIXED HYPERLIPIDEMIA: Chronic | ICD-10-CM

## 2018-05-10 DIAGNOSIS — I10 ESSENTIAL HYPERTENSION: Chronic | ICD-10-CM

## 2018-05-10 DIAGNOSIS — E66.9 OBESITY (BMI 30.0-34.9): ICD-10-CM

## 2018-05-10 DIAGNOSIS — Z95.820 S/P ANGIOPLASTY WITH STENT: ICD-10-CM

## 2018-05-10 DIAGNOSIS — I25.110 CORONARY ARTERY DISEASE INVOLVING NATIVE CORONARY ARTERY OF NATIVE HEART WITH UNSTABLE ANGINA PECTORIS (HCC): Primary | Chronic | ICD-10-CM

## 2018-05-10 PROCEDURE — 99214 OFFICE O/P EST MOD 30 MIN: CPT | Performed by: INTERNAL MEDICINE

## 2018-05-10 PROCEDURE — 93000 ELECTROCARDIOGRAM COMPLETE: CPT | Performed by: INTERNAL MEDICINE

## 2018-05-10 RX ORDER — NITROGLYCERIN 400 UG/1
2 SPRAY ORAL EVERY 5 MIN PRN
Qty: 1 BOTTLE | Refills: 3 | Status: SHIPPED | OUTPATIENT
Start: 2018-05-10 | End: 2022-03-24 | Stop reason: SDUPTHER

## 2018-05-14 ENCOUNTER — HOSPITAL ENCOUNTER (OUTPATIENT)
Dept: CARDIAC REHAB | Age: 63
Setting detail: THERAPIES SERIES
Discharge: HOME | End: 2018-05-14
Payer: COMMERCIAL

## 2018-05-14 PROCEDURE — 93798 PHYS/QHP OP CAR RHAB W/ECG: CPT

## 2018-05-15 ENCOUNTER — HOSPITAL ENCOUNTER (OUTPATIENT)
Dept: CARDIAC REHAB | Age: 63
Setting detail: THERAPIES SERIES
End: 2018-05-15
Payer: COMMERCIAL

## 2018-05-16 ENCOUNTER — HOSPITAL ENCOUNTER (OUTPATIENT)
Dept: CARDIAC REHAB | Age: 63
Setting detail: THERAPIES SERIES
Discharge: HOME | End: 2018-05-16
Payer: COMMERCIAL

## 2018-05-16 PROCEDURE — 93798 PHYS/QHP OP CAR RHAB W/ECG: CPT

## 2018-05-17 ENCOUNTER — HOSPITAL ENCOUNTER (OUTPATIENT)
Dept: CARDIAC REHAB | Age: 63
Setting detail: THERAPIES SERIES
Discharge: HOME | End: 2018-05-17
Payer: COMMERCIAL

## 2018-05-17 PROCEDURE — 93798 PHYS/QHP OP CAR RHAB W/ECG: CPT

## 2018-05-21 ENCOUNTER — HOSPITAL ENCOUNTER (OUTPATIENT)
Dept: CARDIAC REHAB | Age: 63
Setting detail: THERAPIES SERIES
Discharge: HOME | End: 2018-05-21
Payer: COMMERCIAL

## 2018-05-21 PROCEDURE — 93798 PHYS/QHP OP CAR RHAB W/ECG: CPT

## 2018-05-23 ENCOUNTER — HOSPITAL ENCOUNTER (OUTPATIENT)
Dept: CARDIAC REHAB | Age: 63
Setting detail: THERAPIES SERIES
Discharge: HOME | End: 2018-05-23
Payer: COMMERCIAL

## 2018-05-23 PROCEDURE — 93798 PHYS/QHP OP CAR RHAB W/ECG: CPT

## 2018-05-24 ENCOUNTER — HOSPITAL ENCOUNTER (OUTPATIENT)
Dept: CARDIAC REHAB | Age: 63
Setting detail: THERAPIES SERIES
Discharge: HOME | End: 2018-05-24
Payer: COMMERCIAL

## 2018-05-24 PROCEDURE — 93798 PHYS/QHP OP CAR RHAB W/ECG: CPT

## 2018-05-30 ENCOUNTER — HOSPITAL ENCOUNTER (OUTPATIENT)
Dept: PHYSICAL THERAPY | Age: 63
Setting detail: THERAPIES SERIES
Discharge: HOME | End: 2018-05-30
Payer: COMMERCIAL

## 2018-05-30 ENCOUNTER — HOSPITAL ENCOUNTER (OUTPATIENT)
Dept: CARDIAC REHAB | Age: 63
Setting detail: THERAPIES SERIES
Discharge: HOME | End: 2018-05-30
Payer: COMMERCIAL

## 2018-05-30 PROCEDURE — 97035 APP MDLTY 1+ULTRASOUND EA 15: CPT

## 2018-05-30 PROCEDURE — G8978 MOBILITY CURRENT STATUS: HCPCS

## 2018-05-30 PROCEDURE — 97162 PT EVAL MOD COMPLEX 30 MIN: CPT

## 2018-05-30 PROCEDURE — 93798 PHYS/QHP OP CAR RHAB W/ECG: CPT

## 2018-05-30 PROCEDURE — G8979 MOBILITY GOAL STATUS: HCPCS

## 2018-05-30 PROCEDURE — 97033 APP MDLTY 1+IONTPHRSIS EA 15: CPT

## 2018-05-31 ENCOUNTER — HOSPITAL ENCOUNTER (OUTPATIENT)
Dept: CARDIAC REHAB | Age: 63
Setting detail: THERAPIES SERIES
Discharge: HOME | End: 2018-05-31
Payer: COMMERCIAL

## 2018-05-31 PROCEDURE — 93798 PHYS/QHP OP CAR RHAB W/ECG: CPT

## 2018-06-04 ENCOUNTER — HOSPITAL ENCOUNTER (OUTPATIENT)
Dept: PHYSICAL THERAPY | Age: 63
Setting detail: THERAPIES SERIES
Discharge: HOME | End: 2018-06-04
Payer: MEDICARE

## 2018-06-04 ENCOUNTER — HOSPITAL ENCOUNTER (OUTPATIENT)
Dept: CARDIAC REHAB | Age: 63
Setting detail: THERAPIES SERIES
Discharge: HOME | End: 2018-06-04
Payer: MEDICARE

## 2018-06-04 PROCEDURE — 97035 APP MDLTY 1+ULTRASOUND EA 15: CPT

## 2018-06-04 PROCEDURE — 93798 PHYS/QHP OP CAR RHAB W/ECG: CPT

## 2018-06-04 PROCEDURE — 97110 THERAPEUTIC EXERCISES: CPT

## 2018-06-04 PROCEDURE — 97033 APP MDLTY 1+IONTPHRSIS EA 15: CPT

## 2018-06-05 ENCOUNTER — HOSPITAL ENCOUNTER (OUTPATIENT)
Dept: PHYSICAL THERAPY | Age: 63
Setting detail: THERAPIES SERIES
Discharge: HOME | End: 2018-06-05
Payer: MEDICARE

## 2018-06-05 PROCEDURE — 97140 MANUAL THERAPY 1/> REGIONS: CPT

## 2018-06-05 PROCEDURE — 97110 THERAPEUTIC EXERCISES: CPT

## 2018-06-05 PROCEDURE — 97033 APP MDLTY 1+IONTPHRSIS EA 15: CPT

## 2018-06-05 PROCEDURE — 97035 APP MDLTY 1+ULTRASOUND EA 15: CPT

## 2018-06-06 ENCOUNTER — APPOINTMENT (OUTPATIENT)
Dept: PHYSICAL THERAPY | Age: 63
End: 2018-06-06
Payer: MEDICARE

## 2018-06-07 ENCOUNTER — APPOINTMENT (OUTPATIENT)
Dept: PHYSICAL THERAPY | Age: 63
End: 2018-06-07
Payer: MEDICARE

## 2018-06-11 ENCOUNTER — HOSPITAL ENCOUNTER (OUTPATIENT)
Dept: PHYSICAL THERAPY | Age: 63
Setting detail: THERAPIES SERIES
Discharge: HOME | End: 2018-06-11
Payer: MEDICARE

## 2018-06-11 ENCOUNTER — HOSPITAL ENCOUNTER (OUTPATIENT)
Dept: CARDIAC REHAB | Age: 63
Setting detail: THERAPIES SERIES
Discharge: HOME | End: 2018-06-11
Payer: MEDICARE

## 2018-06-11 PROCEDURE — 93798 PHYS/QHP OP CAR RHAB W/ECG: CPT

## 2018-06-11 PROCEDURE — 97033 APP MDLTY 1+IONTPHRSIS EA 15: CPT

## 2018-06-11 PROCEDURE — 97110 THERAPEUTIC EXERCISES: CPT

## 2018-06-11 PROCEDURE — 97035 APP MDLTY 1+ULTRASOUND EA 15: CPT

## 2018-06-13 ENCOUNTER — HOSPITAL ENCOUNTER (OUTPATIENT)
Dept: PHYSICAL THERAPY | Age: 63
Setting detail: THERAPIES SERIES
Discharge: HOME | End: 2018-06-13
Payer: MEDICARE

## 2018-06-13 ENCOUNTER — HOSPITAL ENCOUNTER (OUTPATIENT)
Dept: SLEEP CENTER | Age: 63
Discharge: HOME | End: 2018-06-13
Payer: MEDICARE

## 2018-06-13 ENCOUNTER — HOSPITAL ENCOUNTER (OUTPATIENT)
Dept: CARDIAC REHAB | Age: 63
Setting detail: THERAPIES SERIES
Discharge: HOME | End: 2018-06-13
Payer: MEDICARE

## 2018-06-13 DIAGNOSIS — I10 ESSENTIAL HYPERTENSION: Chronic | ICD-10-CM

## 2018-06-13 DIAGNOSIS — Z95.820 S/P ANGIOPLASTY WITH STENT: ICD-10-CM

## 2018-06-13 DIAGNOSIS — I25.110 CORONARY ARTERY DISEASE INVOLVING NATIVE CORONARY ARTERY OF NATIVE HEART WITH UNSTABLE ANGINA PECTORIS (HCC): Chronic | ICD-10-CM

## 2018-06-13 DIAGNOSIS — E78.2 MIXED HYPERLIPIDEMIA: Chronic | ICD-10-CM

## 2018-06-13 PROCEDURE — 97035 APP MDLTY 1+ULTRASOUND EA 15: CPT

## 2018-06-13 PROCEDURE — 97033 APP MDLTY 1+IONTPHRSIS EA 15: CPT

## 2018-06-13 PROCEDURE — 93798 PHYS/QHP OP CAR RHAB W/ECG: CPT

## 2018-06-13 PROCEDURE — 97140 MANUAL THERAPY 1/> REGIONS: CPT

## 2018-06-13 PROCEDURE — 97110 THERAPEUTIC EXERCISES: CPT

## 2018-06-13 PROCEDURE — 95806 SLEEP STUDY UNATT&RESP EFFT: CPT

## 2018-06-13 ASSESSMENT — SLEEP AND FATIGUE QUESTIONNAIRES
ESS TOTAL SCORE: 7
HOW LIKELY ARE YOU TO NOD OFF OR FALL ASLEEP IN A CAR, WHILE STOPPED FOR A FEW MINUTES IN TRAFFIC: 0
HOW LIKELY ARE YOU TO NOD OFF OR FALL ASLEEP WHILE LYING DOWN TO REST IN THE AFTERNOON WHEN CIRCUMSTANCES PERMIT: 1
HOW LIKELY ARE YOU TO NOD OFF OR FALL ASLEEP WHILE SITTING QUIETLY AFTER LUNCH WITHOUT ALCOHOL: 1
HOW LIKELY ARE YOU TO NOD OFF OR FALL ASLEEP WHILE SITTING AND TALKING TO SOMEONE: 0
HOW LIKELY ARE YOU TO NOD OFF OR FALL ASLEEP WHILE SITTING INACTIVE IN A PUBLIC PLACE: 1
HOW LIKELY ARE YOU TO NOD OFF OR FALL ASLEEP WHEN YOU ARE A PASSENGER IN A CAR FOR AN HOUR WITHOUT A BREAK: 1
HOW LIKELY ARE YOU TO NOD OFF OR FALL ASLEEP WHILE SITTING AND READING: 2
HOW LIKELY ARE YOU TO NOD OFF OR FALL ASLEEP WHILE WATCHING TV: 1

## 2018-06-14 ENCOUNTER — HOSPITAL ENCOUNTER (OUTPATIENT)
Dept: PHYSICAL THERAPY | Age: 63
Setting detail: THERAPIES SERIES
Discharge: HOME | End: 2018-06-14
Payer: MEDICARE

## 2018-06-14 ENCOUNTER — HOSPITAL ENCOUNTER (OUTPATIENT)
Dept: CARDIAC REHAB | Age: 63
Setting detail: THERAPIES SERIES
Discharge: HOME | End: 2018-06-14
Payer: MEDICARE

## 2018-06-14 PROCEDURE — 93798 PHYS/QHP OP CAR RHAB W/ECG: CPT

## 2018-06-14 PROCEDURE — 97035 APP MDLTY 1+ULTRASOUND EA 15: CPT

## 2018-06-14 PROCEDURE — 97033 APP MDLTY 1+IONTPHRSIS EA 15: CPT

## 2018-06-14 PROCEDURE — 97110 THERAPEUTIC EXERCISES: CPT

## 2018-06-18 ENCOUNTER — APPOINTMENT (OUTPATIENT)
Dept: PHYSICAL THERAPY | Age: 63
End: 2018-06-18
Payer: MEDICARE

## 2018-06-20 ENCOUNTER — APPOINTMENT (OUTPATIENT)
Dept: PHYSICAL THERAPY | Age: 63
End: 2018-06-20
Payer: MEDICARE

## 2018-06-21 ENCOUNTER — APPOINTMENT (OUTPATIENT)
Dept: PHYSICAL THERAPY | Age: 63
End: 2018-06-21
Payer: MEDICARE

## 2018-06-25 ENCOUNTER — HOSPITAL ENCOUNTER (OUTPATIENT)
Dept: PHYSICAL THERAPY | Age: 63
Setting detail: THERAPIES SERIES
Discharge: HOME | End: 2018-06-25
Payer: MEDICARE

## 2018-06-25 ENCOUNTER — HOSPITAL ENCOUNTER (OUTPATIENT)
Dept: CARDIAC REHAB | Age: 63
Setting detail: THERAPIES SERIES
Discharge: HOME | End: 2018-06-25
Payer: MEDICARE

## 2018-06-25 PROCEDURE — 97033 APP MDLTY 1+IONTPHRSIS EA 15: CPT

## 2018-06-25 PROCEDURE — 93798 PHYS/QHP OP CAR RHAB W/ECG: CPT

## 2018-06-25 PROCEDURE — 97110 THERAPEUTIC EXERCISES: CPT

## 2018-06-25 PROCEDURE — 97140 MANUAL THERAPY 1/> REGIONS: CPT

## 2018-06-25 PROCEDURE — 97035 APP MDLTY 1+ULTRASOUND EA 15: CPT

## 2018-06-27 ENCOUNTER — HOSPITAL ENCOUNTER (OUTPATIENT)
Dept: CARDIAC REHAB | Age: 63
Setting detail: THERAPIES SERIES
Discharge: HOME | End: 2018-06-27
Payer: MEDICARE

## 2018-06-27 ENCOUNTER — HOSPITAL ENCOUNTER (OUTPATIENT)
Dept: PHYSICAL THERAPY | Age: 63
Setting detail: THERAPIES SERIES
Discharge: HOME | End: 2018-06-27
Payer: MEDICARE

## 2018-06-27 PROCEDURE — G8979 MOBILITY GOAL STATUS: HCPCS

## 2018-06-27 PROCEDURE — 97140 MANUAL THERAPY 1/> REGIONS: CPT

## 2018-06-27 PROCEDURE — G8978 MOBILITY CURRENT STATUS: HCPCS

## 2018-06-27 PROCEDURE — 97035 APP MDLTY 1+ULTRASOUND EA 15: CPT

## 2018-06-27 PROCEDURE — 93798 PHYS/QHP OP CAR RHAB W/ECG: CPT

## 2018-06-27 PROCEDURE — 97033 APP MDLTY 1+IONTPHRSIS EA 15: CPT

## 2018-06-28 ENCOUNTER — HOSPITAL ENCOUNTER (OUTPATIENT)
Dept: PHYSICAL THERAPY | Age: 63
Setting detail: THERAPIES SERIES
Discharge: HOME | End: 2018-06-28
Payer: MEDICARE

## 2018-06-28 ENCOUNTER — HOSPITAL ENCOUNTER (OUTPATIENT)
Dept: CARDIAC REHAB | Age: 63
Setting detail: THERAPIES SERIES
Discharge: HOME | End: 2018-06-28
Payer: MEDICARE

## 2018-06-28 DIAGNOSIS — G47.33 OSA (OBSTRUCTIVE SLEEP APNEA): Primary | ICD-10-CM

## 2018-06-28 PROCEDURE — 97110 THERAPEUTIC EXERCISES: CPT

## 2018-06-28 PROCEDURE — 93798 PHYS/QHP OP CAR RHAB W/ECG: CPT

## 2018-06-28 PROCEDURE — 97033 APP MDLTY 1+IONTPHRSIS EA 15: CPT

## 2018-06-28 PROCEDURE — 97035 APP MDLTY 1+ULTRASOUND EA 15: CPT

## 2018-06-28 PROCEDURE — 97140 MANUAL THERAPY 1/> REGIONS: CPT

## 2018-07-05 ENCOUNTER — HOSPITAL ENCOUNTER (OUTPATIENT)
Dept: CARDIAC REHAB | Age: 63
Setting detail: THERAPIES SERIES
Discharge: HOME | End: 2018-07-05
Payer: MEDICARE

## 2018-07-05 ENCOUNTER — HOSPITAL ENCOUNTER (OUTPATIENT)
Dept: PHYSICAL THERAPY | Age: 63
Setting detail: THERAPIES SERIES
Discharge: HOME | End: 2018-07-05
Payer: MEDICARE

## 2018-07-05 PROCEDURE — 93798 PHYS/QHP OP CAR RHAB W/ECG: CPT

## 2018-07-05 PROCEDURE — 97110 THERAPEUTIC EXERCISES: CPT

## 2018-07-05 PROCEDURE — 97035 APP MDLTY 1+ULTRASOUND EA 15: CPT

## 2018-07-05 NOTE — PROGRESS NOTES
[x]Met   []Partially met  []Not met   Short term goal 2: Pt to begin gentle strengthening exercises. - met  [x]Met   []Partially met  []Not met   Short term goal 3: Pt to report pain no greater than 5/10 in left Achilles. - met  [x]Met  []Partially met  []Not met      []Met   []Partially met  []Not met     Long Term Goals - Time Frame for Long term goals : 6 weeks  Long term goal 1: Pt to report independence and compliance with home program.  []Met  []Partially met  []Not met   Long term goal 2: Pt to report pain no greater than 3/10 in left foot while golfing. []Met  []Partially met  []Not met   Long term goal 3: Pt to have little to no tenderness left distal achilles. []Met  []Partially met  []Not met   Long term goal 4: Pt to demonstrate 4/5 strength left ankle PF with pain no greater than 2/10.   []Met  []Partially met  []Not met     []Met  []Partially met  []Not met       Minutes Tracking:  Time In: 1015  Time Out: 1100  Minutes: 134 Ramona Ocampo, DORINA   Date: 7/5/2018

## 2018-07-09 ENCOUNTER — HOSPITAL ENCOUNTER (OUTPATIENT)
Dept: CARDIAC REHAB | Age: 63
Setting detail: THERAPIES SERIES
Discharge: HOME | End: 2018-07-09
Payer: MEDICARE

## 2018-07-09 ENCOUNTER — HOSPITAL ENCOUNTER (OUTPATIENT)
Dept: PHYSICAL THERAPY | Age: 63
Setting detail: THERAPIES SERIES
Discharge: HOME | End: 2018-07-09
Payer: MEDICARE

## 2018-07-09 PROCEDURE — 97035 APP MDLTY 1+ULTRASOUND EA 15: CPT

## 2018-07-09 PROCEDURE — 97033 APP MDLTY 1+IONTPHRSIS EA 15: CPT

## 2018-07-09 PROCEDURE — 97140 MANUAL THERAPY 1/> REGIONS: CPT

## 2018-07-09 PROCEDURE — 97110 THERAPEUTIC EXERCISES: CPT

## 2018-07-09 PROCEDURE — 93798 PHYS/QHP OP CAR RHAB W/ECG: CPT

## 2018-07-09 NOTE — PROGRESS NOTES
Phone: Andres           Fax: [phone]                           Outpatient Physical Therapy                                                                            Daily Note    Patient: Mark Altman : 1955  CSN #: [id number]   Referring Practitioner:  Dr. Alka Montoya    Referral Date : 18     Date: 2018    Diagnosis: Left Achilles Tendonitis  Treatment Diagnosis: left heel pain, difficulty walking    Onset Date: 17  PT Insurance Information: [employer]- 30 VISITS MAX COVERED % AFTER 35.00 CO-PAY AQUATIC NOT COVERED  Total # of Visits Approved: 12 Per Physician Order  Total # of Visits to Date: 11  No Show: 0  Canceled Appointment: 0      Pre-Treatment Pain:  3/10  Subjective: Walking without a limp. Walk hills while golfing with min discomfort left heel. States still gets about 3/10 pain but much improved. Exercises:  Exercise 2: Slant Board Stretch 3x30\"  Exercise 3: Rocker Board 20x 2-way   Exercise 4: Heel Raises/ Mini Squats on AirEx foam x30  Exercise 6: BOSU step-ons with L LE 2x15 (blue side)  Exercise 7: SLS L LE rebounder 4# 1 min x 2  Exercise 8: step with heel raises 2 laps        Manual:  Soft Tissue Mobalization: Cupping and stretching left Achilles    Modalities:  Ultrasound: 8 mins at 20% 1.0   Iontophoresis: 60 mA/min - more proximal achilles vs on heel       Assessment  Assessment: Pt has completed 11 PT visits for left heel pain. Gait much improved with less pain noted. Strength left ankle PF 4/5. Min tenderness left medial Achilles. Will continue PT an additional 2-3 weeks in order to progress toward long term goals. Patient Education  Balbina Santiago  Pt verbalized/demonstrated good understanding:     [x] Yes         [] No, pt required further clarification.     Post Treatment Pain:  3/10      Plan  Times per week: 3  Plan weeks: 6      Goals  (Total # of Visits to Date: 6)   Short Term Goals - Time Frame for Short term

## 2018-07-11 ENCOUNTER — HOSPITAL ENCOUNTER (OUTPATIENT)
Dept: PHYSICAL THERAPY | Age: 63
Setting detail: THERAPIES SERIES
Discharge: HOME | End: 2018-07-11
Payer: MEDICARE

## 2018-07-11 ENCOUNTER — HOSPITAL ENCOUNTER (OUTPATIENT)
Dept: CARDIAC REHAB | Age: 63
Setting detail: THERAPIES SERIES
Discharge: HOME | End: 2018-07-11
Payer: MEDICARE

## 2018-07-11 PROCEDURE — G8979 MOBILITY GOAL STATUS: HCPCS

## 2018-07-11 PROCEDURE — G8980 MOBILITY D/C STATUS: HCPCS

## 2018-07-11 PROCEDURE — 93798 PHYS/QHP OP CAR RHAB W/ECG: CPT

## 2018-07-11 PROCEDURE — 97033 APP MDLTY 1+IONTPHRSIS EA 15: CPT

## 2018-07-11 PROCEDURE — 97140 MANUAL THERAPY 1/> REGIONS: CPT

## 2018-07-11 PROCEDURE — 97110 THERAPEUTIC EXERCISES: CPT

## 2018-07-11 PROCEDURE — 97035 APP MDLTY 1+ULTRASOUND EA 15: CPT

## 2018-07-11 NOTE — PROGRESS NOTES
Phase II Cardiac Rehab Individualized Treatment Plan-60 Day     Patient Name: Nely Gomez  Date of Initial Assessment: 7/11/2018  ACCOUNT #: [de-identified]  Diagnosis: PTCA with Stent    Onset Date: 04/17/18  Referring Physician: Dr Goyo Holt  Risk Stratification: High  Session Number: 18   EXERCISE    Stages of Change:   [] pre-contemplation   [x] Action   [] Contemplate   [] Maintainence   [x] Prep   [] Relapse          Exercise Prescription:  Mode: [x] TM [x] B [x] STP [] EL [x] R  Frequency: 3 x week  Duration: 31-60 min   Intensity: METs 3.1  Plan/Goal: Increase 1-2 levels/week or 1-2 min/week to achieve target HR and RPE   11-13. Progression:Progressed from 2.2 to 3.1 and increased times on both machines. Target HR     Maximum      [] Angina with Exertion THR:    [] Resistance Training Weight (lbs):  4 Reps: 10-15    Hypertension:  [x] Yes  [] No  Resting BP: 122/74  Peak Exercise BP: 112/64  [] Med change? Intervention:  Home Exercise:  Type: walking   Duration: 30 min   Frequency: Daily    [x] Resistance Training    Education:   [x] Equipment [place]  [x] Self pulse   [x] Proper use weights/therabands   [x] S/S to report  [x] Low Na Diet    [x] Warm up/ Cool down  [] BP Medication    [x] RPE Scale   [] Understand BP   [x] Ex Safety   [] Exercise specialist class-Home Exercise       Target Goal:   -Individual Exercise Plan  -BP<140/90 or <130/80 if DM   -Aerobic active 30 + minutes 5-7 days per week    Nutrition    Stages of Change:   [] pre-contemplation   [x] Action   [] Contemplate   [] Maintainence   [x] Prep   [] Relapse    Lipids: Total Cholesterol: 168  129Triglycerides: 129  HDL: 46  LDL: 91  [] Med Change? Diabetes:  [] Yes  [x] No  Random BS:  HbA1c:  [] Med Change?     Weight Management:  Wt Goal: 1-2 lbs/wk    Intervention:   [x] Dietitian Consult       [x] Nurse/Patient Discussion     [x] Diet Class           [] Referred to Diabetes

## 2018-07-11 NOTE — DISCHARGE SUMMARY
term goal 1: Pt to be issued HEP. - met  Short term goal 2: Pt to begin gentle strengthening exercises. - met  Short term goal 3: Pt to report pain no greater than 5/10 in left Achilles. - met    Long term goals  Time Frame for Long term goals : 6 weeks  Long term goal 1: Pt to report independence and compliance with home program. - met  Long term goal 2: Pt to report pain no greater than 3/10 in left foot while golfing. - met  Long term goal 3: Pt to have little to no tenderness left distal achilles.  - progressing  Long term goal 4: Pt to demonstrate 4/5 strength left ankle PF with pain no greater than 2/10. - met      Reason for Discharge  [x] Goals Achieved                       [] Poor Follow Through/Attendance                  [x] Optimal Function Achieved     [] Patient Discharged Self    [] Hospitalization                         [] Physician discharge      Thank you for this referral      Jin Delgado PT, DPT, CMPT                    Date: 7/11/2018

## 2018-07-12 ENCOUNTER — HOSPITAL ENCOUNTER (OUTPATIENT)
Dept: CARDIAC REHAB | Age: 63
Setting detail: THERAPIES SERIES
Discharge: HOME | End: 2018-07-12
Payer: MEDICARE

## 2018-07-12 PROCEDURE — 93798 PHYS/QHP OP CAR RHAB W/ECG: CPT

## 2018-07-16 ENCOUNTER — HOSPITAL ENCOUNTER (OUTPATIENT)
Dept: CARDIAC REHAB | Age: 63
Setting detail: THERAPIES SERIES
Discharge: HOME | End: 2018-07-16
Payer: MEDICARE

## 2018-07-16 ENCOUNTER — APPOINTMENT (OUTPATIENT)
Dept: PHYSICAL THERAPY | Age: 63
End: 2018-07-16
Payer: MEDICARE

## 2018-07-16 PROCEDURE — 93798 PHYS/QHP OP CAR RHAB W/ECG: CPT

## 2018-07-18 ENCOUNTER — APPOINTMENT (OUTPATIENT)
Dept: PHYSICAL THERAPY | Age: 63
End: 2018-07-18
Payer: MEDICARE

## 2018-07-19 ENCOUNTER — HOSPITAL ENCOUNTER (OUTPATIENT)
Dept: CARDIAC REHAB | Age: 63
Setting detail: THERAPIES SERIES
Discharge: HOME | End: 2018-07-19
Payer: MEDICARE

## 2018-07-19 PROCEDURE — 93798 PHYS/QHP OP CAR RHAB W/ECG: CPT

## 2018-08-01 ENCOUNTER — HOSPITAL ENCOUNTER (OUTPATIENT)
Dept: CARDIAC REHAB | Age: 63
Setting detail: THERAPIES SERIES
Discharge: HOME | End: 2018-08-01
Payer: MEDICARE

## 2018-08-01 PROCEDURE — 93798 PHYS/QHP OP CAR RHAB W/ECG: CPT

## 2018-08-02 ENCOUNTER — HOSPITAL ENCOUNTER (OUTPATIENT)
Dept: CARDIAC REHAB | Age: 63
Setting detail: THERAPIES SERIES
Discharge: HOME | End: 2018-08-02
Payer: MEDICARE

## 2018-08-02 PROCEDURE — 93798 PHYS/QHP OP CAR RHAB W/ECG: CPT

## 2018-08-06 ENCOUNTER — HOSPITAL ENCOUNTER (OUTPATIENT)
Dept: SLEEP CENTER | Age: 63
Discharge: HOME | End: 2018-08-06
Payer: MEDICARE

## 2018-08-06 DIAGNOSIS — G47.33 OSA (OBSTRUCTIVE SLEEP APNEA): ICD-10-CM

## 2018-08-06 PROCEDURE — 95811 POLYSOM 6/>YRS CPAP 4/> PARM: CPT

## 2018-08-07 NOTE — PROGRESS NOTES
Patient Name: Lisa Valdes   : 1955     Admitting diagnosis: Obstructive sleep apnea (adult) (pediatric) [G47.33]       Medical History:   Past Medical History:   Diagnosis Date    CAD (coronary artery disease)     s/p multiple stents     History of DVT (deep vein thrombosis)     right calf DVT    Hyperlipidemia     Hypertension            Patient information faxed to [employer] company. This includes the face sheet, H&P, diagnostic test results and prescription for equipment and pressures. The patient wore a F[employer]P Eson nasal mask in a size large.     Rachel Morton

## 2018-08-08 ENCOUNTER — HOSPITAL ENCOUNTER (OUTPATIENT)
Dept: CARDIAC REHAB | Age: 63
Setting detail: THERAPIES SERIES
Discharge: HOME | End: 2018-08-08
Payer: MEDICARE

## 2018-08-08 ENCOUNTER — HOSPITAL ENCOUNTER (OUTPATIENT)
Age: 63
Discharge: HOME | End: 2018-08-08
Payer: MEDICARE

## 2018-08-08 DIAGNOSIS — R97.20 ELEVATED PSA: ICD-10-CM

## 2018-08-08 LAB
ABSOLUTE EOS #: 0.15 K/UL (ref 0–0.44)
ABSOLUTE IMMATURE GRANULOCYTE: <0.03 K/UL (ref 0–0.3)
ABSOLUTE LYMPH #: 1.21 K/UL (ref 1.1–3.7)
ABSOLUTE MONO #: 0.42 K/UL (ref 0.1–1.2)
ALBUMIN SERPL-MCNC: 4.2 G/DL (ref 3.5–5.2)
ALBUMIN/GLOBULIN RATIO: 1.6 (ref 1–2.5)
ALP BLD-CCNC: 76 U/L (ref 40–129)
ALT SERPL-CCNC: 37 U/L (ref 5–41)
ANION GAP SERPL CALCULATED.3IONS-SCNC: 11 MMOL/L (ref 9–17)
AST SERPL-CCNC: 29 U/L
BASOPHILS # BLD: 1 % (ref 0–2)
BASOPHILS ABSOLUTE: 0.05 K/UL (ref 0–0.2)
BILIRUB SERPL-MCNC: 1.19 MG/DL (ref 0.3–1.2)
BUN BLDV-MCNC: 13 MG/DL (ref 8–23)
BUN/CREAT BLD: 17 (ref 9–20)
CALCIUM SERPL-MCNC: 9.3 MG/DL (ref 8.6–10.4)
CHLORIDE BLD-SCNC: 99 MMOL/L (ref 98–107)
CHOLESTEROL/HDL RATIO: 2.9
CHOLESTEROL: 140 MG/DL
CO2: 27 MMOL/L (ref 20–31)
CREAT SERPL-MCNC: 0.76 MG/DL (ref 0.7–1.2)
DIFFERENTIAL TYPE: NORMAL
EOSINOPHILS RELATIVE PERCENT: 3 % (ref 1–4)
GFR AFRICAN AMERICAN: >60 ML/MIN
GFR NON-AFRICAN AMERICAN: >60 ML/MIN
GFR SERPL CREATININE-BSD FRML MDRD: ABNORMAL ML/MIN/{1.73_M2}
GFR SERPL CREATININE-BSD FRML MDRD: ABNORMAL ML/MIN/{1.73_M2}
GLUCOSE BLD-MCNC: 105 MG/DL (ref 70–99)
HCT VFR BLD CALC: 44.2 % (ref 40.7–50.3)
HDLC SERPL-MCNC: 48 MG/DL
HEMOGLOBIN: 15 G/DL (ref 13–17)
IMMATURE GRANULOCYTES: 0 %
LDL CHOLESTEROL: 78 MG/DL (ref 0–130)
LYMPHOCYTES # BLD: 26 % (ref 24–43)
MCH RBC QN AUTO: 31.5 PG (ref 25.2–33.5)
MCHC RBC AUTO-ENTMCNC: 33.9 G/DL (ref 28.4–34.8)
MCV RBC AUTO: 92.9 FL (ref 82.6–102.9)
MONOCYTES # BLD: 9 % (ref 3–12)
NRBC AUTOMATED: 0 PER 100 WBC
PDW BLD-RTO: 13.8 % (ref 11.8–14.4)
PLATELET # BLD: 192 K/UL (ref 138–453)
PLATELET ESTIMATE: NORMAL
PMV BLD AUTO: 9.8 FL (ref 8.1–13.5)
POTASSIUM SERPL-SCNC: 4 MMOL/L (ref 3.7–5.3)
PROSTATE SPECIFIC ANTIGEN: 3.83 UG/L
RBC # BLD: 4.76 M/UL (ref 4.21–5.77)
RBC # BLD: NORMAL 10*6/UL
SEG NEUTROPHILS: 61 % (ref 36–65)
SEGMENTED NEUTROPHILS ABSOLUTE COUNT: 2.77 K/UL (ref 1.5–8.1)
SODIUM BLD-SCNC: 137 MMOL/L (ref 135–144)
TOTAL PROTEIN: 6.9 G/DL (ref 6.4–8.3)
TRIGL SERPL-MCNC: 72 MG/DL
VLDLC SERPL CALC-MCNC: NORMAL MG/DL (ref 1–30)
WBC # BLD: 4.6 K/UL (ref 3.5–11.3)
WBC # BLD: NORMAL 10*3/UL

## 2018-08-08 PROCEDURE — 80053 COMPREHEN METABOLIC PANEL: CPT

## 2018-08-08 PROCEDURE — 85025 COMPLETE CBC W/AUTO DIFF WBC: CPT

## 2018-08-08 PROCEDURE — 80061 LIPID PANEL: CPT

## 2018-08-08 PROCEDURE — 84153 ASSAY OF PSA TOTAL: CPT

## 2018-08-08 PROCEDURE — 36415 COLL VENOUS BLD VENIPUNCTURE: CPT

## 2018-08-08 PROCEDURE — 93798 PHYS/QHP OP CAR RHAB W/ECG: CPT

## 2018-08-09 ENCOUNTER — HOSPITAL ENCOUNTER (OUTPATIENT)
Dept: CARDIAC REHAB | Age: 63
Setting detail: THERAPIES SERIES
Discharge: HOME | End: 2018-08-09
Payer: MEDICARE

## 2018-08-09 ENCOUNTER — OFFICE VISIT (OUTPATIENT)
Dept: CARDIOLOGY | Age: 63
End: 2018-08-09
Payer: MEDICARE

## 2018-08-09 VITALS
RESPIRATION RATE: 20 BRPM | TEMPERATURE: 97.3 F | WEIGHT: 266 LBS | BODY MASS INDEX: 39.28 KG/M2 | HEART RATE: 62 BPM | SYSTOLIC BLOOD PRESSURE: 105 MMHG | DIASTOLIC BLOOD PRESSURE: 60 MMHG

## 2018-08-09 DIAGNOSIS — Z95.5 S/P DRUG ELUTING CORONARY STENT PLACEMENT: ICD-10-CM

## 2018-08-09 DIAGNOSIS — G47.33 OBSTRUCTIVE SLEEP APNEA: ICD-10-CM

## 2018-08-09 DIAGNOSIS — E78.2 MIXED HYPERLIPIDEMIA: Chronic | ICD-10-CM

## 2018-08-09 DIAGNOSIS — I10 ESSENTIAL HYPERTENSION: Chronic | ICD-10-CM

## 2018-08-09 DIAGNOSIS — E66.9 OBESITY, CLASS II, BMI 35-39.9: ICD-10-CM

## 2018-08-09 DIAGNOSIS — I25.110 CORONARY ARTERY DISEASE INVOLVING NATIVE CORONARY ARTERY OF NATIVE HEART WITH UNSTABLE ANGINA PECTORIS (HCC): Primary | Chronic | ICD-10-CM

## 2018-08-09 PROCEDURE — 99214 OFFICE O/P EST MOD 30 MIN: CPT | Performed by: INTERNAL MEDICINE

## 2018-08-09 PROCEDURE — 1036F TOBACCO NON-USER: CPT | Performed by: INTERNAL MEDICINE

## 2018-08-09 PROCEDURE — 93798 PHYS/QHP OP CAR RHAB W/ECG: CPT

## 2018-08-09 PROCEDURE — G8427 DOCREV CUR MEDS BY ELIG CLIN: HCPCS | Performed by: INTERNAL MEDICINE

## 2018-08-09 PROCEDURE — 3017F COLORECTAL CA SCREEN DOC REV: CPT | Performed by: INTERNAL MEDICINE

## 2018-08-09 PROCEDURE — G8417 CALC BMI ABV UP PARAM F/U: HCPCS | Performed by: INTERNAL MEDICINE

## 2018-08-09 PROCEDURE — 1123F ACP DISCUSS/DSCN MKR DOCD: CPT | Performed by: INTERNAL MEDICINE

## 2018-08-09 PROCEDURE — 4040F PNEUMOC VAC/ADMIN/RCVD: CPT | Performed by: INTERNAL MEDICINE

## 2018-08-09 PROCEDURE — G8598 ASA/ANTIPLAT THER USED: HCPCS | Performed by: INTERNAL MEDICINE

## 2018-08-09 PROCEDURE — 1101F PT FALLS ASSESS-DOCD LE1/YR: CPT | Performed by: INTERNAL MEDICINE

## 2018-08-09 RX ORDER — EZETIMIBE 10 MG/1
10 TABLET ORAL DAILY
Qty: 30 TABLET | Refills: 3 | Status: SHIPPED | OUTPATIENT
Start: 2018-08-09 | End: 2018-12-11 | Stop reason: SDUPTHER

## 2018-08-09 NOTE — PROGRESS NOTES
CHRISTIANE Melendez am scribing for and in the presence of Dr. Checo Valdes  Patient: Tori Casey  : 1955  Date of Visit: 2018    REASON FOR VISIT / CONSULTATION: 3 Month Follow-Up (f/u stents x 3 in May)      Dear Dr. Isis Hendricks MD    I had the pleasure of seeing Tori Casey in my office today. Mr. Vaibhav Ellison is a 70 y.o. male with a history of atherosclerotic heart disease. Mr. Vaibhav Ellison has history of coronary artery disease with 3 stents placed back in . Another stent in . With regard to symptoms prior to his PCI's, patient said he usually has epigastric pain and excessive fatigue. No clear history of myocardial infarction. No history of heart failure or significant arrhythmia.     He has history of hypertension, dyslipidemia and obstructive sleep apnea ( unable to use CPAP). No clear family history of premature CAD.     He is admitted to hospital on 2018 with left-sided chest heaviness. Pain occurred at rest.  No significant radiation. Not associated with any sweating or diaphoresis. Pain relieved in the emergency room after taking 2 sublingual nitroglycerin. S/P Cardiac cath on 2018 95% in stent-restenosis in the proximal LAD with a 95% ostial stenosis in a fairly large D1 branch S/P DESx2 to mid LAD and PTCA-MATTHEW to ostial D1    Mr. Vaibhav Ellison reports having constant body aches, however he denies any chest pain, pressure or tightness. He reports not sleeping very well at night. Had repeat sleep study done awaiting result. Said he is side sleeper and his hips hurt so he has to Turn multiple timer throughout the Night. He denies any lightheaded or dizziness or any palpitations. No significant shortness of breath. He is still doing his cardiac rehab and is feeling well. Recent lipid panel reviewed, LDL is 78.     Past Medical History:   Diagnosis Date    CAD (coronary artery disease)     s/p multiple stents     History of DVT (deep vein thrombosis)  right calf DVT    Hyperlipidemia     Hypertension        CURRENT ALLERGIES: Patient has no known allergies. REVIEW OF SYSTEMS: 14 systems were reviewed. Pertinent positives and negatives as above, all else negative. Past Surgical History:   Procedure Laterality Date    CARDIAC CATHETERIZATION Left 04/17/2018    Right radial/[facility]/Dr Wren/95% in stent-restenosis in the proximal LAD with a 95% ostial 95% stenosis in a fairly large D1 branch that appears to be jailed. Normal LVEDP    CORONARY ANGIOPLASTY WITH STENT PLACEMENT      4 stents placed    KNEE SURGERY      SHOULDER SURGERY      Social History:  Social History   Substance Use Topics    Smoking status: Former Smoker    Smokeless tobacco: Never Used      Comment: quit 20 years ago    Alcohol use Yes      Comment: occas        CURRENT MEDICATIONS:  Outpatient Prescriptions Marked as Taking for the 8/9/18 encounter (Office Visit) with Yue Arnold MD   Medication Sig Dispense Refill    ezetimibe (ZETIA) 10 MG tablet Take 1 tablet by mouth daily 30 tablet 3    carvedilol (COREG) 3.125 MG tablet Take 1 tablet by mouth 2 times daily (with meals) 60 tablet 3    ticagrelor (BRILINTA) 90 MG TABS tablet Take 1 tablet by mouth 2 times daily 60 tablet 11    lisinopril (PRINIVIL;ZESTRIL) 10 MG tablet Take 10 mg by mouth daily      atorvastatin (LIPITOR) 80 MG tablet Take 80 mg by mouth daily      hydrochlorothiazide (HYDRODIURIL) 25 MG tablet Take 25 mg by mouth daily.  aspirin 81 MG EC tablet Take 81 mg by mouth daily. FAMILY HISTORY: family history includes Kidney Disease in his father. PHYSICAL EXAM:   /60 (Site: Left Arm, Position: Sitting, Cuff Size: Large Adult)   Pulse 62   Temp 97.3 °F (36.3 °C) (Temporal)   Resp 20   Wt 266 lb (120.7 kg)   BMI 39.28 kg/m²  Body mass index is 39.28 kg/m². Constitutional: He is oriented to person, place, and time. He appears well-developed and well-nourished.  In no

## 2018-08-13 ENCOUNTER — HOSPITAL ENCOUNTER (OUTPATIENT)
Dept: CARDIAC REHAB | Age: 63
Setting detail: THERAPIES SERIES
Discharge: HOME | End: 2018-08-13
Payer: MEDICARE

## 2018-08-13 PROCEDURE — 93798 PHYS/QHP OP CAR RHAB W/ECG: CPT

## 2018-08-13 NOTE — PROGRESS NOTES
[address] [address]                                    SLEEP STUDY    PATIENT NAME: Barbie Slater                    :        1955  MED REC NO:   [id number]                              ROOM:  ACCOUNT NO:   [de-identified]                           ADMIT DATE: 2018  PROVIDER:     Farhat Hammer    SLEEP IMPROVEMENT [facility]  INTERPRETATION OF CLINICAL POLYSOMNOGRAPHY    DATE OF STUDY:  2018    REFERRING PROVIDER:  Dr. Cyndi Griffith. CLINICAL DIAGNOSES:  1.  Obstructive sleep apnea syndrome. 2.  Obesity. 3.  Coronary artery disease. 4.  Hypertension. PROCEDURE STANDARD:  It was a 1-night CPAP evaluation. PROCEDURE:  The sleep/wake evaluation consisted of an intensive intake  interview, one night of clinical polysomnography, a nocturnal respiratory  battery, left and right leg anterior tibialis surface electromyography. The standard montage for clinical polysomnography included the  electroencephalogram, the electro-oculogram, mentalis surface  electromyography, and modified Lead II cardiography. The respiratory  battery consisted of measurements of oral/nasal airflow by heat thermistor,  nasal pressure transducer, thoracic and abdominal effort by Respiratory  Inductance Plethysmography. Nocturnal oxyhemoglobin saturations were  obtained by finger oximetry. Polysomnography revealed that while on CPAP, the patient spent 347 minutes  in bed with a total sleep time of 185 minutes. Sleep efficiency was  reduced to 51%. Latency of sleep onset was 15 minutes. There were 79  sleep stage changes. There were 27 awakenings during the night. Sleep architecture was normal with 13% stage I, 58% stage II, 0 delta, and  28% REM. Latency of various sleep stages was normal other than prolongation of REM,  which was 132 minutes.     Polysomnography did not reveal any other

## 2018-08-16 ENCOUNTER — HOSPITAL ENCOUNTER (OUTPATIENT)
Dept: CARDIAC REHAB | Age: 63
Setting detail: THERAPIES SERIES
Discharge: HOME | End: 2018-08-16
Payer: MEDICARE

## 2018-08-16 PROCEDURE — 93798 PHYS/QHP OP CAR RHAB W/ECG: CPT

## 2018-08-22 ENCOUNTER — HOSPITAL ENCOUNTER (OUTPATIENT)
Dept: CARDIAC REHAB | Age: 63
Setting detail: THERAPIES SERIES
Discharge: HOME | End: 2018-08-22
Payer: MEDICARE

## 2018-08-22 PROCEDURE — 93798 PHYS/QHP OP CAR RHAB W/ECG: CPT

## 2018-08-23 ENCOUNTER — HOSPITAL ENCOUNTER (OUTPATIENT)
Dept: CARDIAC REHAB | Age: 63
Setting detail: THERAPIES SERIES
Discharge: HOME | End: 2018-08-23
Payer: MEDICARE

## 2018-08-23 PROCEDURE — 93798 PHYS/QHP OP CAR RHAB W/ECG: CPT

## 2018-09-05 ENCOUNTER — HOSPITAL ENCOUNTER (OUTPATIENT)
Dept: CARDIAC REHAB | Age: 63
Setting detail: THERAPIES SERIES
Discharge: HOME | End: 2018-09-05
Payer: MEDICARE

## 2018-09-05 PROCEDURE — 93798 PHYS/QHP OP CAR RHAB W/ECG: CPT

## 2018-09-10 ENCOUNTER — HOSPITAL ENCOUNTER (OUTPATIENT)
Dept: CARDIAC REHAB | Age: 63
Setting detail: THERAPIES SERIES
Discharge: HOME | End: 2018-09-10
Payer: MEDICARE

## 2018-09-10 PROCEDURE — 93798 PHYS/QHP OP CAR RHAB W/ECG: CPT

## 2018-10-01 RX ORDER — CARVEDILOL 3.12 MG/1
3.12 TABLET ORAL 2 TIMES DAILY WITH MEALS
Qty: 180 TABLET | Refills: 3 | Status: SHIPPED | OUTPATIENT
Start: 2018-10-01 | End: 2019-02-13 | Stop reason: SDUPTHER

## 2018-11-19 ENCOUNTER — OFFICE VISIT (OUTPATIENT)
Dept: UROLOGY | Age: 63
End: 2018-11-19
Payer: MEDICARE

## 2018-11-19 ENCOUNTER — HOSPITAL ENCOUNTER (OUTPATIENT)
Age: 63
Setting detail: SPECIMEN
Discharge: HOME | End: 2018-11-19
Payer: MEDICARE

## 2018-11-19 VITALS
WEIGHT: 274 LBS | SYSTOLIC BLOOD PRESSURE: 117 MMHG | BODY MASS INDEX: 40.46 KG/M2 | DIASTOLIC BLOOD PRESSURE: 75 MMHG | HEART RATE: 59 BPM

## 2018-11-19 DIAGNOSIS — N40.1 BPH WITH OBSTRUCTION/LOWER URINARY TRACT SYMPTOMS: ICD-10-CM

## 2018-11-19 DIAGNOSIS — R97.20 ELEVATED PSA: Primary | ICD-10-CM

## 2018-11-19 DIAGNOSIS — N13.8 BPH WITH OBSTRUCTION/LOWER URINARY TRACT SYMPTOMS: ICD-10-CM

## 2018-11-19 LAB
-: NORMAL
AMORPHOUS: NORMAL
BACTERIA: NORMAL
BILIRUBIN URINE: NEGATIVE
CASTS UA: NORMAL /LPF
COLOR: YELLOW
COMMENT UA: NORMAL
CRYSTALS, UA: NORMAL /HPF
EPITHELIAL CELLS UA: NORMAL /HPF (ref 0–5)
GLUCOSE URINE: NEGATIVE
KETONES, URINE: NEGATIVE
LEUKOCYTE ESTERASE, URINE: NEGATIVE
MUCUS: NORMAL
NITRITE, URINE: NEGATIVE
OTHER OBSERVATIONS UA: NORMAL
PH UA: 6.5 (ref 5–9)
PROTEIN UA: NEGATIVE
RBC UA: NORMAL /HPF (ref 0–2)
RENAL EPITHELIAL, UA: NORMAL /HPF
SPECIFIC GRAVITY UA: 1.01 (ref 1.01–1.02)
TRICHOMONAS: NORMAL
TURBIDITY: CLEAR
URINE HGB: NEGATIVE
UROBILINOGEN, URINE: NORMAL
WBC UA: NORMAL /HPF (ref 0–5)
YEAST: NORMAL

## 2018-11-19 PROCEDURE — G8417 CALC BMI ABV UP PARAM F/U: HCPCS | Performed by: NURSE PRACTITIONER

## 2018-11-19 PROCEDURE — 99213 OFFICE O/P EST LOW 20 MIN: CPT | Performed by: NURSE PRACTITIONER

## 2018-11-19 PROCEDURE — 81001 URINALYSIS AUTO W/SCOPE: CPT

## 2018-11-19 PROCEDURE — G8484 FLU IMMUNIZE NO ADMIN: HCPCS | Performed by: NURSE PRACTITIONER

## 2018-11-19 PROCEDURE — 4040F PNEUMOC VAC/ADMIN/RCVD: CPT | Performed by: NURSE PRACTITIONER

## 2018-11-19 PROCEDURE — 87086 URINE CULTURE/COLONY COUNT: CPT

## 2018-11-19 PROCEDURE — G8428 CUR MEDS NOT DOCUMENT: HCPCS | Performed by: NURSE PRACTITIONER

## 2018-11-19 PROCEDURE — G8598 ASA/ANTIPLAT THER USED: HCPCS | Performed by: NURSE PRACTITIONER

## 2018-11-19 PROCEDURE — 1101F PT FALLS ASSESS-DOCD LE1/YR: CPT | Performed by: NURSE PRACTITIONER

## 2018-11-19 PROCEDURE — 3017F COLORECTAL CA SCREEN DOC REV: CPT | Performed by: NURSE PRACTITIONER

## 2018-11-19 PROCEDURE — 1036F TOBACCO NON-USER: CPT | Performed by: NURSE PRACTITIONER

## 2018-11-19 PROCEDURE — 1123F ACP DISCUSS/DSCN MKR DOCD: CPT | Performed by: NURSE PRACTITIONER

## 2018-11-19 ASSESSMENT — ENCOUNTER SYMPTOMS
BACK PAIN: 0
ABDOMINAL PAIN: 0
CONSTIPATION: 0
NAUSEA: 0
VOMITING: 0
COLOR CHANGE: 0
COUGH: 0
EYE REDNESS: 0
WHEEZING: 0
EYE PAIN: 0
SHORTNESS OF BREATH: 0

## 2018-11-20 LAB
CULTURE: NO GROWTH
Lab: NORMAL
SPECIMEN DESCRIPTION: NORMAL
STATUS: NORMAL

## 2018-11-21 ENCOUNTER — TELEPHONE (OUTPATIENT)
Dept: UROLOGY | Age: 63
End: 2018-11-21

## 2018-11-21 NOTE — TELEPHONE ENCOUNTER
----- Message from JENNA York - CNP sent at 11/21/2018  8:49 AM EST -----  Call pt - urine cx reviewed and negative for UTI & for significant microhematuria

## 2018-12-11 DIAGNOSIS — E78.2 MIXED HYPERLIPIDEMIA: Chronic | ICD-10-CM

## 2018-12-11 RX ORDER — EZETIMIBE 10 MG/1
TABLET ORAL
Qty: 90 TABLET | Refills: 3 | Status: SHIPPED | OUTPATIENT
Start: 2018-12-11 | End: 2019-02-13 | Stop reason: SDUPTHER

## 2019-02-11 ENCOUNTER — TELEPHONE (OUTPATIENT)
Dept: CARDIOLOGY | Age: 64
End: 2019-02-11

## 2019-02-11 ENCOUNTER — HOSPITAL ENCOUNTER (OUTPATIENT)
Age: 64
Discharge: HOME | End: 2019-02-11
Payer: MEDICARE

## 2019-02-11 DIAGNOSIS — E78.2 MIXED HYPERLIPIDEMIA: Chronic | ICD-10-CM

## 2019-02-11 LAB
CHOLESTEROL/HDL RATIO: 2.9
CHOLESTEROL: 113 MG/DL
HDLC SERPL-MCNC: 39 MG/DL
LDL CHOLESTEROL: 58 MG/DL (ref 0–130)
TRIGL SERPL-MCNC: 80 MG/DL
VLDLC SERPL CALC-MCNC: ABNORMAL MG/DL (ref 1–30)

## 2019-02-11 PROCEDURE — 80061 LIPID PANEL: CPT

## 2019-02-11 PROCEDURE — 36415 COLL VENOUS BLD VENIPUNCTURE: CPT

## 2019-02-12 ENCOUNTER — HOSPITAL ENCOUNTER (OUTPATIENT)
Age: 64
Discharge: HOME | End: 2019-02-12
Payer: MEDICARE

## 2019-02-12 LAB
ABSOLUTE EOS #: 0.18 K/UL (ref 0–0.44)
ABSOLUTE IMMATURE GRANULOCYTE: <0.03 K/UL (ref 0–0.3)
ABSOLUTE LYMPH #: 1.47 K/UL (ref 1.1–3.7)
ABSOLUTE MONO #: 0.4 K/UL (ref 0.1–1.2)
ALBUMIN SERPL-MCNC: 4.2 G/DL (ref 3.5–5.2)
ALBUMIN/GLOBULIN RATIO: 1.4 (ref 1–2.5)
ALP BLD-CCNC: 72 U/L (ref 40–129)
ALT SERPL-CCNC: 45 U/L (ref 5–41)
ANION GAP SERPL CALCULATED.3IONS-SCNC: 10 MMOL/L (ref 9–17)
AST SERPL-CCNC: 28 U/L
BASOPHILS # BLD: 1 % (ref 0–2)
BASOPHILS ABSOLUTE: 0.06 K/UL (ref 0–0.2)
BILIRUB SERPL-MCNC: 0.54 MG/DL (ref 0.3–1.2)
BUN BLDV-MCNC: 16 MG/DL (ref 8–23)
BUN/CREAT BLD: 23 (ref 9–20)
CALCIUM SERPL-MCNC: 9.5 MG/DL (ref 8.6–10.4)
CHLORIDE BLD-SCNC: 97 MMOL/L (ref 98–107)
CO2: 29 MMOL/L (ref 20–31)
CREAT SERPL-MCNC: 0.69 MG/DL (ref 0.7–1.2)
DIFFERENTIAL TYPE: NORMAL
EOSINOPHILS RELATIVE PERCENT: 3 % (ref 1–4)
ESTIMATED AVERAGE GLUCOSE: 117 MG/DL
GFR AFRICAN AMERICAN: >60 ML/MIN
GFR NON-AFRICAN AMERICAN: >60 ML/MIN
GFR SERPL CREATININE-BSD FRML MDRD: ABNORMAL ML/MIN/{1.73_M2}
GFR SERPL CREATININE-BSD FRML MDRD: ABNORMAL ML/MIN/{1.73_M2}
GLUCOSE BLD-MCNC: 93 MG/DL (ref 70–99)
HBA1C MFR BLD: 5.7 % (ref 4.8–5.9)
HCT VFR BLD CALC: 44.8 % (ref 40.7–50.3)
HEMOGLOBIN: 14.7 G/DL (ref 13–17)
IMMATURE GRANULOCYTES: 0 %
LYMPHOCYTES # BLD: 27 % (ref 24–43)
MCH RBC QN AUTO: 31.3 PG (ref 25.2–33.5)
MCHC RBC AUTO-ENTMCNC: 32.8 G/DL (ref 28.4–34.8)
MCV RBC AUTO: 95.5 FL (ref 82.6–102.9)
MONOCYTES # BLD: 8 % (ref 3–12)
NRBC AUTOMATED: 0 PER 100 WBC
PDW BLD-RTO: 13.4 % (ref 11.8–14.4)
PLATELET # BLD: 240 K/UL (ref 138–453)
PLATELET ESTIMATE: NORMAL
PMV BLD AUTO: 9.6 FL (ref 8.1–13.5)
POTASSIUM SERPL-SCNC: 4.1 MMOL/L (ref 3.7–5.3)
RBC # BLD: 4.69 M/UL (ref 4.21–5.77)
RBC # BLD: NORMAL 10*6/UL
SEG NEUTROPHILS: 61 % (ref 36–65)
SEGMENTED NEUTROPHILS ABSOLUTE COUNT: 3.23 K/UL (ref 1.5–8.1)
SODIUM BLD-SCNC: 136 MMOL/L (ref 135–144)
TOTAL PROTEIN: 7.1 G/DL (ref 6.4–8.3)
WBC # BLD: 5.4 K/UL (ref 3.5–11.3)
WBC # BLD: NORMAL 10*3/UL

## 2019-02-12 PROCEDURE — 36415 COLL VENOUS BLD VENIPUNCTURE: CPT

## 2019-02-12 PROCEDURE — 83036 HEMOGLOBIN GLYCOSYLATED A1C: CPT

## 2019-02-12 PROCEDURE — 80053 COMPREHEN METABOLIC PANEL: CPT

## 2019-02-12 PROCEDURE — 85025 COMPLETE CBC W/AUTO DIFF WBC: CPT

## 2019-02-13 ENCOUNTER — OFFICE VISIT (OUTPATIENT)
Dept: CARDIOLOGY | Age: 64
End: 2019-02-13
Payer: MEDICARE

## 2019-02-13 VITALS
WEIGHT: 282.8 LBS | OXYGEN SATURATION: 95 % | HEIGHT: 69 IN | BODY MASS INDEX: 41.89 KG/M2 | HEART RATE: 59 BPM | DIASTOLIC BLOOD PRESSURE: 77 MMHG | RESPIRATION RATE: 16 BRPM | SYSTOLIC BLOOD PRESSURE: 126 MMHG

## 2019-02-13 DIAGNOSIS — E78.2 MIXED HYPERLIPIDEMIA: Chronic | ICD-10-CM

## 2019-02-13 DIAGNOSIS — I25.10 ASHD (ARTERIOSCLEROTIC HEART DISEASE): Primary | ICD-10-CM

## 2019-02-13 DIAGNOSIS — I10 ESSENTIAL HYPERTENSION: Chronic | ICD-10-CM

## 2019-02-13 DIAGNOSIS — Z95.820 S/P ANGIOPLASTY WITH STENT: ICD-10-CM

## 2019-02-13 DIAGNOSIS — E66.01 CLASS 3 SEVERE OBESITY WITH BODY MASS INDEX (BMI) OF 40.0 TO 44.9 IN ADULT, UNSPECIFIED OBESITY TYPE, UNSPECIFIED WHETHER SERIOUS COMORBIDITY PRESENT (HCC): ICD-10-CM

## 2019-02-13 PROCEDURE — 1036F TOBACCO NON-USER: CPT | Performed by: INTERNAL MEDICINE

## 2019-02-13 PROCEDURE — G8598 ASA/ANTIPLAT THER USED: HCPCS | Performed by: INTERNAL MEDICINE

## 2019-02-13 PROCEDURE — 4040F PNEUMOC VAC/ADMIN/RCVD: CPT | Performed by: INTERNAL MEDICINE

## 2019-02-13 PROCEDURE — 99214 OFFICE O/P EST MOD 30 MIN: CPT | Performed by: INTERNAL MEDICINE

## 2019-02-13 PROCEDURE — 3017F COLORECTAL CA SCREEN DOC REV: CPT | Performed by: INTERNAL MEDICINE

## 2019-02-13 PROCEDURE — 1123F ACP DISCUSS/DSCN MKR DOCD: CPT | Performed by: INTERNAL MEDICINE

## 2019-02-13 PROCEDURE — G8417 CALC BMI ABV UP PARAM F/U: HCPCS | Performed by: INTERNAL MEDICINE

## 2019-02-13 PROCEDURE — G8427 DOCREV CUR MEDS BY ELIG CLIN: HCPCS | Performed by: INTERNAL MEDICINE

## 2019-02-13 PROCEDURE — G8484 FLU IMMUNIZE NO ADMIN: HCPCS | Performed by: INTERNAL MEDICINE

## 2019-02-13 PROCEDURE — 1101F PT FALLS ASSESS-DOCD LE1/YR: CPT | Performed by: INTERNAL MEDICINE

## 2019-02-13 RX ORDER — EZETIMIBE 10 MG/1
TABLET ORAL
Qty: 90 TABLET | Refills: 3 | Status: SHIPPED | OUTPATIENT
Start: 2019-02-13 | End: 2022-03-24

## 2019-02-13 RX ORDER — CARVEDILOL 3.12 MG/1
3.12 TABLET ORAL 2 TIMES DAILY WITH MEALS
Qty: 180 TABLET | Refills: 3 | Status: SHIPPED | OUTPATIENT
Start: 2019-02-13 | End: 2019-08-21 | Stop reason: SDUPTHER

## 2019-03-06 ENCOUNTER — TELEPHONE (OUTPATIENT)
Dept: SURGERY | Age: 64
End: 2019-03-06

## 2019-03-06 ENCOUNTER — OFFICE VISIT (OUTPATIENT)
Dept: SURGERY | Age: 64
End: 2019-03-06

## 2019-03-06 VITALS
BODY MASS INDEX: 42.14 KG/M2 | RESPIRATION RATE: 24 BRPM | DIASTOLIC BLOOD PRESSURE: 75 MMHG | WEIGHT: 284.5 LBS | HEIGHT: 69 IN | SYSTOLIC BLOOD PRESSURE: 131 MMHG | HEART RATE: 64 BPM | TEMPERATURE: 97.1 F

## 2019-03-06 DIAGNOSIS — Z12.11 ENCOUNTER FOR SCREENING COLONOSCOPY: Primary | ICD-10-CM

## 2019-03-06 PROCEDURE — 99999 PR OFFICE/OUTPT VISIT,PROCEDURE ONLY: CPT | Performed by: SURGERY

## 2019-03-08 NOTE — TELEPHONE ENCOUNTER
Okay to hold Brilinta tablet for 5-7 days prior to colonoscopy. Please see if they have a clearance sheet that I can sign and faxed to them. Thank you.

## 2019-03-14 NOTE — TELEPHONE ENCOUNTER
Clearance for Jennifer Madrigal to be off Brilinta prior to colonoscopy received from Dr. Patricia Espinoza. Call placed and message left for Jennifer Madrigal to return call.

## 2019-04-10 NOTE — TELEPHONE ENCOUNTER
Hanna Fonseca returned our call and writer informed him that Dr. Mai Viens cleared him for surgery and he ok'd him to hold his Brilinta 5-7 days prior to his colonoscopy. He voiced understanding.

## 2019-04-25 ENCOUNTER — ANESTHESIA EVENT (OUTPATIENT)
Dept: OPERATING ROOM | Age: 64
End: 2019-04-25
Payer: MEDICARE

## 2019-04-25 NOTE — H&P
GENERAL SURGERY H&P        Patient's Name/ Date of Birth/ Gender: Gee Garcia / 1955 (70 y.o.) / male      PCP: Jeannette Perez MD     History of present Illness:  Patient is a pleasant 71 yo male due for screening colonoscopy, last one 10 years ago that was negative for polyps. He has a total of 7 cardiac stents, some were placed around 2002, then last April 2018 had some placed. On Brilinta and aspirin. Denies family history of colorectal cancer. Denies melena or hematochezia. Denies any significant changes in bowel habits     Past Medical History:  has a past medical history of CAD (coronary artery disease), History of DVT (deep vein thrombosis), Hyperlipidemia, and Hypertension.     Past Surgical History:   Past Surgical History         Past Surgical History:   Procedure Laterality Date    CARDIAC CATHETERIZATION Left 04/17/2018     [facility]/95% in stent-restenosis in the proximal LAD with a 95% ostial 95% stenosis in a fairly large D1 branch that appears to be jailed. Normal LVEDP    CORONARY ANGIOPLASTY WITH STENT PLACEMENT         4 stents placed    KNEE SURGERY        SHOULDER SURGERY                Social History:  reports that he has quit smoking. He has never used smokeless tobacco. He reports that he drinks alcohol. He reports that he does not use drugs.     Family History: family history includes Kidney Disease in his father.     Review of Systems:   General: Denies fever, chills, night sweats, weight loss, malaise, fatigue  HEENT: Denies sore throat, sinus problems, allergic rhinosinusitis  Card: Denies chest pain, palpitations, orthopnea/PND. HTN.  CAD 7 stents, last ones placed April 2018  Pulm: Denies cough, shortness of breath, dyspnea on exertion  GI:  denies  : Denies polyuria, dysuria, hematuria  Endo: denies  Heme: denies  Neuro: Denies h/o CVA, TIA  Skin: Denies rashes, ulcers  Musculoskeletal: no gross motor dysfunction     Allergies: Patient has no known allergies.     Current Meds:  Current Medication     Current Outpatient Medications:     ezetimibe (ZETIA) 10 MG tablet, TAKE ONE TABLET BY MOUTH DAILY, Disp: 90 tablet, Rfl: 3    carvedilol (COREG) 3.125 MG tablet, Take 1 tablet by mouth 2 times daily (with meals), Disp: 180 tablet, Rfl: 3    ticagrelor (BRILINTA) 90 MG TABS tablet, Take 1 tablet by mouth 2 times daily, Disp: 180 tablet, Rfl: 3    lisinopril (PRINIVIL;ZESTRIL) 10 MG tablet, Take 10 mg by mouth daily, Disp: , Rfl:     atorvastatin (LIPITOR) 80 MG tablet, Take 80 mg by mouth daily, Disp: , Rfl:     hydrochlorothiazide (HYDRODIURIL) 25 MG tablet, Take 25 mg by mouth daily. , Disp: , Rfl:     aspirin 81 MG EC tablet, Take 81 mg by mouth daily. , Disp: , Rfl:     nitroGLYCERIN (NITROLINGUAL) 0.4 MG/SPRAY 0.4 mg spray, Place 2 sprays under the tongue every 5 minutes as needed for Chest pain, Disp: 1 Bottle, Rfl: 3    albuterol (PROVENTIL HFA;VENTOLIN HFA) 108 (90 BASE) MCG/ACT inhaler, Inhale 2 puffs into the lungs every 6 hours as needed for Wheezing or Shortness of Breath for 30 days. , Disp: 1 Inhaler, Rfl: 11        Physical Exam:  Vital signs and Nurse's note reviewed  Gen:  A&Ox3, NAD. Pleasant and cooperative.   HEENT: PERRLA, EOMI, no scleral icterus  CVS: Regular rate and rhythm  Resp:no active wheezing, no labored breathing  Abd: soft, non-tender, non-distended, bowel sounds present, no prior abdominal surgeries, rectal exam to be done at scope  Ext: Moves all extremities, no gross focal motor deficits  Skin: No erythema or ulcerations      Labs:         Lab Results   Component Value Date     WBC 5.4 02/12/2019     HGB 14.7 02/12/2019     HCT 44.8 02/12/2019     MCV 95.5 02/12/2019      02/12/2019      06/01/2012            Lab Results   Component Value Date      02/12/2019     K 4.1 02/12/2019     CL 97 02/12/2019     CO2 29 02/12/2019     BUN 16 02/12/2019     CREATININE 0.69 02/12/2019     GLUCOSE 93 02/12/2019   GLUCOSE 100 06/01/2012     CALCIUM 9.5 02/12/2019            Lab Results   Component Value Date     ALKPHOS 72 02/12/2019     ALT 45 02/12/2019     AST 28 02/12/2019     PROT 7.1 02/12/2019     BILITOT 0.54 02/12/2019     BILIDIR 0.16 06/01/2012     LABALBU 4.2 02/12/2019     LABALBU 3.8 06/01/2012      No results found for: LACTA        Lab Results   Component Value Date     AMYLASE 20 05/31/2012            Lab Results   Component Value Date     LIPASE 24 05/31/2012            Lab Results   Component Value Date     INR 1.0 04/17/2018         Impressions/Recommendations:      Due for screening colonoscopy.     Risks and benefits of colonoscopy have been discussed in detail with the patient. Risks of the procedure include bleeding, bowel perforation, possibly missing smaller lesions if the bowel prep is not adequate. Alternative studies include barium enema and virtual colonoscopy; however, if a lesion is seen, then one would still need to proceed with the gold standard colonoscopy to retrieve or biopsy the lesion. All the patient's questions are answered. Will proceed with colonoscopy under [facility].         Due to 7 stents, ok to continue to aspirin. Need clearance to hold Brilinta and for how many days from cardiology Dr. Nicole Mckeon.        H&P  General Surgery        Pt Name: Zoraida Lira  MRN: [id number]  YOB: 1955  Date of evaluation: 4/26/2019      [x] I have examined the patient and reviewed the H&P/Consult completed, and there are no changes to the patient or plans.      [] I have examined the patient and reviewed the H&P/Consult and have noted the following changes:         Electronically signed by Raymond Burnham DO  on 4/26/2019 at 10:34 AM

## 2019-04-26 ENCOUNTER — HOSPITAL ENCOUNTER (OUTPATIENT)
Age: 64
Setting detail: OUTPATIENT SURGERY
Discharge: HOME | End: 2019-04-26
Attending: SURGERY | Admitting: SURGERY
Payer: MEDICARE

## 2019-04-26 ENCOUNTER — ANESTHESIA (OUTPATIENT)
Dept: OPERATING ROOM | Age: 64
End: 2019-04-26
Payer: MEDICARE

## 2019-04-26 VITALS
RESPIRATION RATE: 20 BRPM | SYSTOLIC BLOOD PRESSURE: 122 MMHG | DIASTOLIC BLOOD PRESSURE: 59 MMHG | OXYGEN SATURATION: 97 %

## 2019-04-26 VITALS
OXYGEN SATURATION: 96 % | DIASTOLIC BLOOD PRESSURE: 86 MMHG | HEART RATE: 60 BPM | WEIGHT: 275 LBS | SYSTOLIC BLOOD PRESSURE: 154 MMHG | TEMPERATURE: 98.4 F | BODY MASS INDEX: 40.73 KG/M2 | HEIGHT: 69 IN | RESPIRATION RATE: 20 BRPM

## 2019-04-26 PROBLEM — K57.30 DIVERTICULOSIS OF LARGE INTESTINE WITHOUT HEMORRHAGE: Status: ACTIVE | Noted: 2019-04-26

## 2019-04-26 PROCEDURE — 2580000003 HC RX 258: Performed by: SURGERY

## 2019-04-26 PROCEDURE — 88305 TISSUE EXAM BY PATHOLOGIST: CPT

## 2019-04-26 PROCEDURE — 7100000011 HC PHASE II RECOVERY - ADDTL 15 MIN: Performed by: SURGERY

## 2019-04-26 PROCEDURE — 3700000001 HC ADD 15 MINUTES (ANESTHESIA): Performed by: SURGERY

## 2019-04-26 PROCEDURE — 2709999900 HC NON-CHARGEABLE SUPPLY: Performed by: SURGERY

## 2019-04-26 PROCEDURE — 7100000010 HC PHASE II RECOVERY - FIRST 15 MIN: Performed by: SURGERY

## 2019-04-26 PROCEDURE — 6360000002 HC RX W HCPCS: Performed by: NURSE ANESTHETIST, CERTIFIED REGISTERED

## 2019-04-26 PROCEDURE — 45380 COLONOSCOPY AND BIOPSY: CPT | Performed by: SURGERY

## 2019-04-26 PROCEDURE — 3609010600 HC COLONOSCOPY POLYPECTOMY SNARE/COLD BIOPSY: Performed by: SURGERY

## 2019-04-26 PROCEDURE — 2500000003 HC RX 250 WO HCPCS: Performed by: NURSE ANESTHETIST, CERTIFIED REGISTERED

## 2019-04-26 PROCEDURE — 3700000000 HC ANESTHESIA ATTENDED CARE: Performed by: SURGERY

## 2019-04-26 RX ORDER — PROPOFOL 10 MG/ML
INJECTION, EMULSION INTRAVENOUS PRN
Status: DISCONTINUED | OUTPATIENT
Start: 2019-04-26 | End: 2019-04-26 | Stop reason: SDUPTHER

## 2019-04-26 RX ORDER — PROPOFOL 10 MG/ML
INJECTION, EMULSION INTRAVENOUS CONTINUOUS PRN
Status: DISCONTINUED | OUTPATIENT
Start: 2019-04-26 | End: 2019-04-26 | Stop reason: SDUPTHER

## 2019-04-26 RX ORDER — SODIUM CHLORIDE, SODIUM LACTATE, POTASSIUM CHLORIDE, CALCIUM CHLORIDE 600; 310; 30; 20 MG/100ML; MG/100ML; MG/100ML; MG/100ML
INJECTION, SOLUTION INTRAVENOUS CONTINUOUS
Status: DISCONTINUED | OUTPATIENT
Start: 2019-04-26 | End: 2019-04-26 | Stop reason: HOSPADM

## 2019-04-26 RX ORDER — LIDOCAINE HYDROCHLORIDE 20 MG/ML
INJECTION, SOLUTION EPIDURAL; INFILTRATION; INTRACAUDAL; PERINEURAL PRN
Status: DISCONTINUED | OUTPATIENT
Start: 2019-04-26 | End: 2019-04-26 | Stop reason: SDUPTHER

## 2019-04-26 RX ADMIN — PROPOFOL 30 MG: 10 INJECTION, EMULSION INTRAVENOUS at 11:57

## 2019-04-26 RX ADMIN — PROPOFOL 150 MCG/KG/MIN: 10 INJECTION, EMULSION INTRAVENOUS at 11:47

## 2019-04-26 RX ADMIN — PROPOFOL 30 MG: 10 INJECTION, EMULSION INTRAVENOUS at 11:48

## 2019-04-26 RX ADMIN — PROPOFOL 50 MG: 10 INJECTION, EMULSION INTRAVENOUS at 11:47

## 2019-04-26 RX ADMIN — LIDOCAINE HYDROCHLORIDE 100 MG: 20 INJECTION, SOLUTION EPIDURAL; INFILTRATION; INTRACAUDAL at 11:47

## 2019-04-26 RX ADMIN — SODIUM CHLORIDE, POTASSIUM CHLORIDE, SODIUM LACTATE AND CALCIUM CHLORIDE: 600; 310; 30; 20 INJECTION, SOLUTION INTRAVENOUS at 10:00

## 2019-04-26 NOTE — BRIEF OP NOTE
Brief Postoperative Note  ______________________________________________________________    Patient: Jam Méndez  YOB: 1955  MRN: [id number]  Date of Procedure: 4/26/2019  PCP: Dr. Frank Felix    Pre-Op Diagnosis: screening colonoscopy    Post-Op Diagnosis: moderate to severe diverticulosis, sigmoid.  Small rectal sessile polyp        Procedure(s):  Colonoscopy to cecum with cold forceps rectal polypectomy    Anesthesia: [facility]    Surgeon(s):  Elo Bhardwaj DO    Estimated Blood Loss (mL): none    Complications: none    Specimens:   ID Type Source Tests Collected by Time Destination   A :  Tissue Colon SURGICAL PATHOLOGY Augustine Chang DO 4/26/2019 1209        Augustine Chang DO, FACOS, FACS  Date: 4/26/2019  Time: 12:20 PM

## 2019-04-26 NOTE — ANESTHESIA POSTPROCEDURE EVALUATION
Department of Anesthesiology  Postprocedure Note    Patient: Erum Witt  MRN: [id number]  YOB: 1955  Date of evaluation: 4/26/2019  Time:  12:27 PM     Procedure Summary     Date:  04/26/19 Room / Location:  [facility] AT [facility] ENDO  / [facility] AT [facility]    Anesthesia Start:  8887 Anesthesia Stop:  9342    Procedure:  COLONOSCOPY POLYPECTOMY SNARE/COLD BIOPSY (N/A ) Diagnosis:  (SCREENING)    Surgeon:  Michelle Rucker DO Responsible Provider:  Abigail Gowers, APRN - CRNA    Anesthesia Type:  general, TIVA ASA Status:  3          Anesthesia Type: general, TIVA    Jorge Phase I:      Jorge Phase II:      Last vitals: Reviewed and per EMR flowsheets.        Anesthesia Post Evaluation    Patient location during evaluation: PACU  Patient participation: complete - patient participated  Level of consciousness: awake and alert  Airway patency: patent  Nausea & Vomiting: no vomiting and no nausea  Complications: no  Cardiovascular status: hemodynamically stable  Respiratory status: room air and spontaneous ventilation  Hydration status: stable

## 2019-04-26 NOTE — ANESTHESIA PRE PROCEDURE
Department of Anesthesiology  Preprocedure Note       Name:  Johana Houston   Age:  70 y.o.  :  1955                                          MRN:  [id number]         Date:  2019      Surgeon: Eber Mitchell):  Zhane Roman DO    Procedure: COLORECTAL CANCER SCREENING, NOT HIGH RISK (N/A )    Medications prior to admission:   Prior to Admission medications    Medication Sig Start Date End Date Taking? Authorizing Provider   ezetimibe (ZETIA) 10 MG tablet TAKE ONE TABLET BY MOUTH DAILY 19  Yes Gricelda Atrhur MD   carvedilol (COREG) 3.125 MG tablet Take 1 tablet by mouth 2 times daily (with meals) 19  Yes Gricelda Arthur MD   [facility]) 90 MG TABS tablet Take 1 tablet by mouth 2 times daily 19  Yes Gricelda Arthur MD   lisinopril (PRINIVIL;ZESTRIL) 10 MG tablet Take 10 mg by mouth daily   Yes Historical Provider, MD   atorvastatin (LIPITOR) 80 MG tablet Take 80 mg by mouth daily   Yes Historical Provider, MD   hydrochlorothiazide (HYDRODIURIL) 25 MG tablet Take 25 mg by mouth daily. Yes Historical Provider, MD   aspirin 81 MG EC tablet Take 81 mg by mouth daily. Yes Historical Provider, MD   nitroGLYCERIN (NITROLINGUAL) 0.4 MG/SPRAY 0.4 mg spray Place 2 sprays under the tongue every 5 minutes as needed for Chest pain 5/10/18   Gricelda Arthur MD   albuterol (PROVENTIL HFA;VENTOLIN HFA) 108 (90 BASE) MCG/ACT inhaler Inhale 2 puffs into the lungs every 6 hours as needed for Wheezing or Shortness of Breath for 30 days.  4/3/14 5/10/18  Tari Fritz MD       Current medications:    Current Facility-Administered Medications   Medication Dose Route Frequency Provider Last Rate Last Dose    lactated ringers infusion   Intravenous Continuous Elo Bhardwaj  mL/hr at 19 1000       Facility-Administered Medications Ordered in Other Encounters   Medication Dose Route Frequency Provider Last Rate Last Dose    iopamidol (ISOVUE-370) 76 % injection 90 mL  90 mL Intravenous ONCE DUC Quinn MD           Allergies:  No Known Allergies    Problem List:    Patient Active Problem List   Diagnosis Code    Chest pain R07.9    CAD (coronary artery disease) I25.10    Elevated PSA R97.20    Hyperlipidemia E78.5    Hypertension I10    H/O blood clots Z86.718    Unstable angina (HCC) I20.0    S/P drug eluting coronary stent placement Z95.5       Past Medical History:        Diagnosis Date    CAD (coronary artery disease)     s/p multiple stents     History of DVT (deep vein thrombosis) 1990    right calf DVT    Hyperlipidemia     Hypertension     Morbid obesity ([address]        Past Surgical History:        Procedure Laterality Date    CARDIAC CATHETERIZATION Left 04/17/2018    Right radial/[facility]/Dr Wren/95% in stent-restenosis in the proximal LAD with a 95% ostial 95% stenosis in a fairly large D1 branch that appears to be jailed.  Normal LVEDP    CORONARY ANGIOPLASTY WITH STENT PLACEMENT      4 stents placed- 7 total    KNEE SURGERY      SHOULDER SURGERY         Social History:    Social History     Tobacco Use    Smoking status: Former Smoker    Smokeless tobacco: Never Used    Tobacco comment: quit 20 years ago   Substance Use Topics    Alcohol use: Yes     Comment: occas                                Counseling given: Not Answered  Comment: quit 20 years ago      Vital Signs (Current):   Vitals:    04/26/19 0945   BP: (!) 141/75   Pulse: 62   Resp: 23   Temp: 36.1 °C (96.9 °F)   TempSrc: Temporal   SpO2: 92%   Weight: 275 lb (124.7 kg)   Height: 5' 9\" (1.753 m)                                              BP Readings from Last 3 Encounters:   04/26/19 (!) 141/75   03/06/19 131/75   02/13/19 126/77       NPO Status: Time of last liquid consumption: 0430                        Time of last solid consumption: 1900                        Date of last liquid consumption: 04/26/19                        Date of last solid food consumption: 04/24/19    BMI:   Wt Readings from Last 3 Encounters:   04/26/19 275 lb (124.7 kg)   03/06/19 284 lb 8 oz (129 kg)   02/13/19 282 lb 12.8 oz (128.3 kg)     Body mass index is 40.61 kg/m². CBC:   Lab Results   Component Value Date    WBC 5.4 02/12/2019    RBC 4.69 02/12/2019    RBC 4.24 06/01/2012    HGB 14.7 02/12/2019    HCT 44.8 02/12/2019    MCV 95.5 02/12/2019    RDW 13.4 02/12/2019     02/12/2019     06/01/2012       CMP:   Lab Results   Component Value Date     02/12/2019    K 4.1 02/12/2019    CL 97 02/12/2019    CO2 29 02/12/2019    BUN 16 02/12/2019    CREATININE 0.69 02/12/2019    GFRAA >60 02/12/2019    LABGLOM >60 02/12/2019    GLUCOSE 93 02/12/2019    GLUCOSE 100 06/01/2012    PROT 7.1 02/12/2019    CALCIUM 9.5 02/12/2019    BILITOT 0.54 02/12/2019    ALKPHOS 72 02/12/2019    AST 28 02/12/2019    ALT 45 02/12/2019       POC Tests: No results for input(s): POCGLU, POCNA, POCK, POCCL, POCBUN, POCHEMO, POCHCT in the last 72 hours.     Coags:   Lab Results   Component Value Date    PROTIME 10.5 04/17/2018    PROTIME 10.5 06/01/2012    INR 1.0 04/17/2018    APTT 49.0 04/17/2018       HCG (If Applicable): No results found for: PREGTESTUR, PREGSERUM, HCG, HCGQUANT     ABGs: No results found for: PHART, PO2ART, IAR8FKG, OMG1OXY, BEART, G7BIGXCK     Type & Screen (If Applicable):  No results found for: LABABO, LABRH    Anesthesia Evaluation   no history of anesthetic complications:   Airway: Mallampati: III  TM distance: <3 FB     Mouth opening: > = 3 FB Dental:          Pulmonary:normal exam  breath sounds clear to auscultation  (+) sleep apnea: on noncompliant,                             Cardiovascular:  Exercise tolerance: good (>4 METS),   (+) hypertension:, CAD:, CABG/stent:, hyperlipidemia      ECG reviewed          Cleared by cardiology              Neuro/Psych:   Negative Neuro/Psych ROS              GI/Hepatic/Renal:   (+) bowel prep, morbid obesity          Endo/Other: Negative Endo/Other ROS Abdominal:   (+) obese,         Vascular:   + DVT (right LE 30 years ago), . Anesthesia Plan      general and TIVA     ASA 3       Induction: intravenous. Anesthetic plan and risks discussed with patient.                       JENNA Escobar - CRNA   4/26/2019

## 2019-04-26 NOTE — PROGRESS NOTES
Discharge instructions given to pt and spouse. Both verbalize understanding,deny any questions and/or concerns. Pt ambulates to bathroom w/ standby assist, slow steady gait noted. Then d/c'd off unit in wheelchair w/ all belongings. Discharge Criteria    Inpatients must meet Criteria 1 through 7. All other patients are either YES or N/A. If a NO is chosen then Anesthesia or Surgeon must be notified. 1.  Minimum 30 minutes after last dose of sedative medication, minimum 120 minutes after last dose of reversal agent. Yes      2. Systolic BP stable within 20 mmHg for 30 minutes & systolic BP between 90 & 253 or within 10 mmHg of baseline. Yes      3. Pulse between 60 and 100 or within 10 bpm of baseline. Yes      4. Spontaneous respiratory rate >/= 10 per minute. Yes      5. SaO2 >/= 95 or  >/= baseline. Yes      6. Able to cough and swallow or return to baseline function. Yes      7. Alert and oriented or return to baseline mental status. Yes      8. Demonstrates controlled, coordinated movements, ambulates with steady gait, or return to baseline activity function. Yes      9. Minimal or no pain or nausea, or at a level tolerable and acceptable to patient. Yes      10. Takes and retains oral fluids as allowed. Yes      11. Procedural / perioperative site stable. Minimal or no bleeding. Yes          12. If GI endoscopy procedure, minimal or no abdominal distention or passing flatus. Yes         13. Written discharge instructions and emergency telephone number provided. Yes      14. Accompanied by a responsible adult.     Yes      Adult patient discharged from facility without responsible person meets above criteria plus the following:   a) remains awake without stimulus for 30 minutes     b) oriented appropriate for age      c) all vital signs stable   d) no significant risk of losing protective reflexes      e) able to maintain pre-procedure mobility without

## 2019-04-30 LAB — SURGICAL PATHOLOGY REPORT: NORMAL

## 2019-05-02 NOTE — RESULT ENCOUNTER NOTE
Please call patient and let him know polyp benign but he needs repeat colonoscopy in 5 years.  Letter sent to Dr. Cesar Dietz, but please print it and fax to him, [facility] wont let me send it to him since he is not in epic, thanks

## 2019-05-03 ENCOUNTER — TELEPHONE (OUTPATIENT)
Dept: SURGERY | Age: 64
End: 2019-05-03

## 2019-08-19 ENCOUNTER — HOSPITAL ENCOUNTER (OUTPATIENT)
Age: 64
Discharge: HOME | End: 2019-08-19
Payer: MEDICARE

## 2019-08-19 LAB
ABSOLUTE EOS #: 0.17 K/UL (ref 0–0.44)
ABSOLUTE IMMATURE GRANULOCYTE: <0.03 K/UL (ref 0–0.3)
ABSOLUTE LYMPH #: 1.15 K/UL (ref 1.1–3.7)
ABSOLUTE MONO #: 0.43 K/UL (ref 0.1–1.2)
ALBUMIN SERPL-MCNC: 4.4 G/DL (ref 3.5–5.2)
ALBUMIN/GLOBULIN RATIO: 1.5 (ref 1–2.5)
ALP BLD-CCNC: 76 U/L (ref 40–129)
ALT SERPL-CCNC: 34 U/L (ref 5–41)
ANION GAP SERPL CALCULATED.3IONS-SCNC: 13 MMOL/L (ref 9–17)
AST SERPL-CCNC: 27 U/L
BASOPHILS # BLD: 1 % (ref 0–2)
BASOPHILS ABSOLUTE: 0.06 K/UL (ref 0–0.2)
BILIRUB SERPL-MCNC: 0.89 MG/DL (ref 0.3–1.2)
BUN BLDV-MCNC: 13 MG/DL (ref 8–23)
BUN/CREAT BLD: 18 (ref 9–20)
CALCIUM SERPL-MCNC: 9.6 MG/DL (ref 8.6–10.4)
CHLORIDE BLD-SCNC: 99 MMOL/L (ref 98–107)
CHOLESTEROL/HDL RATIO: 2.8
CHOLESTEROL: 141 MG/DL
CO2: 26 MMOL/L (ref 20–31)
CREAT SERPL-MCNC: 0.71 MG/DL (ref 0.7–1.2)
DIFFERENTIAL TYPE: ABNORMAL
EOSINOPHILS RELATIVE PERCENT: 4 % (ref 1–4)
GFR AFRICAN AMERICAN: >60 ML/MIN
GFR NON-AFRICAN AMERICAN: >60 ML/MIN
GFR SERPL CREATININE-BSD FRML MDRD: ABNORMAL ML/MIN/{1.73_M2}
GFR SERPL CREATININE-BSD FRML MDRD: ABNORMAL ML/MIN/{1.73_M2}
GLUCOSE BLD-MCNC: 110 MG/DL (ref 70–99)
HCT VFR BLD CALC: 45.5 % (ref 40.7–50.3)
HDLC SERPL-MCNC: 51 MG/DL
HEMOGLOBIN: 15 G/DL (ref 13–17)
IMMATURE GRANULOCYTES: 0 %
LDL CHOLESTEROL: 73 MG/DL (ref 0–130)
LYMPHOCYTES # BLD: 25 % (ref 24–43)
MCH RBC QN AUTO: 31.3 PG (ref 25.2–33.5)
MCHC RBC AUTO-ENTMCNC: 33 G/DL (ref 28.4–34.8)
MCV RBC AUTO: 94.8 FL (ref 82.6–102.9)
MONOCYTES # BLD: 9 % (ref 3–12)
NRBC AUTOMATED: 0 PER 100 WBC
PDW BLD-RTO: 14.6 % (ref 11.8–14.4)
PLATELET # BLD: 187 K/UL (ref 138–453)
PLATELET ESTIMATE: ABNORMAL
PMV BLD AUTO: 10 FL (ref 8.1–13.5)
POTASSIUM SERPL-SCNC: 3.8 MMOL/L (ref 3.7–5.3)
RBC # BLD: 4.8 M/UL (ref 4.21–5.77)
RBC # BLD: ABNORMAL 10*6/UL
SEG NEUTROPHILS: 61 % (ref 36–65)
SEGMENTED NEUTROPHILS ABSOLUTE COUNT: 2.84 K/UL (ref 1.5–8.1)
SODIUM BLD-SCNC: 138 MMOL/L (ref 135–144)
TOTAL PROTEIN: 7.3 G/DL (ref 6.4–8.3)
TRIGL SERPL-MCNC: 85 MG/DL
VLDLC SERPL CALC-MCNC: NORMAL MG/DL (ref 1–30)
WBC # BLD: 4.7 K/UL (ref 3.5–11.3)
WBC # BLD: ABNORMAL 10*3/UL

## 2019-08-19 PROCEDURE — 85025 COMPLETE CBC W/AUTO DIFF WBC: CPT

## 2019-08-19 PROCEDURE — 36415 COLL VENOUS BLD VENIPUNCTURE: CPT

## 2019-08-19 PROCEDURE — 80053 COMPREHEN METABOLIC PANEL: CPT

## 2019-08-19 PROCEDURE — 80061 LIPID PANEL: CPT

## 2019-08-21 ENCOUNTER — OFFICE VISIT (OUTPATIENT)
Dept: CARDIOLOGY | Age: 64
End: 2019-08-21
Payer: MEDICARE

## 2019-08-21 VITALS
RESPIRATION RATE: 16 BRPM | HEART RATE: 58 BPM | SYSTOLIC BLOOD PRESSURE: 119 MMHG | HEIGHT: 69 IN | BODY MASS INDEX: 40.26 KG/M2 | WEIGHT: 271.8 LBS | OXYGEN SATURATION: 97 % | DIASTOLIC BLOOD PRESSURE: 74 MMHG

## 2019-08-21 DIAGNOSIS — E66.01 CLASS 3 SEVERE OBESITY WITH BODY MASS INDEX (BMI) OF 40.0 TO 44.9 IN ADULT, UNSPECIFIED OBESITY TYPE, UNSPECIFIED WHETHER SERIOUS COMORBIDITY PRESENT (HCC): ICD-10-CM

## 2019-08-21 DIAGNOSIS — Z95.5 S/P DRUG ELUTING CORONARY STENT PLACEMENT: Primary | ICD-10-CM

## 2019-08-21 DIAGNOSIS — E78.2 MIXED HYPERLIPIDEMIA: ICD-10-CM

## 2019-08-21 DIAGNOSIS — G47.33 OBSTRUCTIVE SLEEP APNEA: ICD-10-CM

## 2019-08-21 DIAGNOSIS — I25.10 ASHD (ARTERIOSCLEROTIC HEART DISEASE): ICD-10-CM

## 2019-08-21 DIAGNOSIS — I10 ESSENTIAL HYPERTENSION: ICD-10-CM

## 2019-08-21 PROCEDURE — 4040F PNEUMOC VAC/ADMIN/RCVD: CPT | Performed by: INTERNAL MEDICINE

## 2019-08-21 PROCEDURE — 1123F ACP DISCUSS/DSCN MKR DOCD: CPT | Performed by: INTERNAL MEDICINE

## 2019-08-21 PROCEDURE — 1036F TOBACCO NON-USER: CPT | Performed by: INTERNAL MEDICINE

## 2019-08-21 PROCEDURE — G8598 ASA/ANTIPLAT THER USED: HCPCS | Performed by: INTERNAL MEDICINE

## 2019-08-21 PROCEDURE — 3017F COLORECTAL CA SCREEN DOC REV: CPT | Performed by: INTERNAL MEDICINE

## 2019-08-21 PROCEDURE — G8427 DOCREV CUR MEDS BY ELIG CLIN: HCPCS | Performed by: INTERNAL MEDICINE

## 2019-08-21 PROCEDURE — 99214 OFFICE O/P EST MOD 30 MIN: CPT | Performed by: INTERNAL MEDICINE

## 2019-08-21 PROCEDURE — 93000 ELECTROCARDIOGRAM COMPLETE: CPT | Performed by: INTERNAL MEDICINE

## 2019-08-21 PROCEDURE — G8417 CALC BMI ABV UP PARAM F/U: HCPCS | Performed by: INTERNAL MEDICINE

## 2019-08-21 RX ORDER — CARVEDILOL 3.12 MG/1
3.12 TABLET ORAL 2 TIMES DAILY WITH MEALS
Qty: 180 TABLET | Refills: 3 | Status: SHIPPED | OUTPATIENT
Start: 2019-08-21 | End: 2020-11-13

## 2019-08-21 NOTE — PROGRESS NOTES
the tongue every 5 minutes as needed for Chest pain 1 Bottle 3    lisinopril (PRINIVIL;ZESTRIL) 10 MG tablet Take 10 mg by mouth daily      atorvastatin (LIPITOR) 80 MG tablet Take 80 mg by mouth daily      hydrochlorothiazide (HYDRODIURIL) 25 MG tablet Take 25 mg by mouth daily.  aspirin 81 MG EC tablet Take 81 mg by mouth daily. FAMILY HISTORY: family history includes Kidney Disease in his father. PHYSICAL EXAM:   /74 (Site: Left Upper Arm, Position: Sitting, Cuff Size: Medium Adult)   Pulse 58   Resp 16   Ht 5' 9\" (1.753 m)   Wt 271 lb 12.8 oz (123.3 kg)   SpO2 97%   BMI 40.14 kg/m²  Body mass index is 40.14 kg/m². Constitutional: He is oriented to person, place, and time. He appears well-developed and well-nourished. In no acute distress. HEENT: Normocephalic and atraumatic. No JVD present. Carotid bruit is not present. No mass and no thyromegaly present. No lymphadenopathy present. Cardiovascular: Normal rate, regular rhythm, normal heart sounds. Exam reveals no gallop and no friction rubs. No heart murmur heard. Pulmonary/Chest: Effort normal and breath sounds normal. No respiratory distress. He has no wheezes, rhonchi or rales. Abdominal: Soft, non-tender. Bowel sounds and aorta are normal. He exhibits no organomegaly, mass or bruit. Extremities: 1+ lower extremity pitting pedal edema. 1/2 way up to the knees. No cyanosis and no clubbing. Pulses are 2+ radial and carotid pulses. 2+ dorsalis pedis and posterior tibial pulses bilaterally. Neurological: He is alert and oriented to person, place, and time. No evidence of gross cranial nerve deficit. Coordination appeared normal.  Chronic vascular changes. Skin: Skin is warm and dry. There is no rash or diaphoresis. Psychiatric: He has a normal mood and affect.  His speech is normal and behavior is normal.        MOST RECENT LABS ON RECORD:   Lab Results   Component Value Date    WBC 4.7 08/19/2019    HGB 15.0 complication of liver or kidney damage. Although rare, I told him that this could be serious and therefore told him to stop the medication immediately and call if he developed any severe muscle aches or pains and he agreed to do so. · Continue Zetia 10 mg daily. · Acceptable LDL. Morbid obesity: Body mass index is 40.14 kg/m². I also briefly discussed both diet and exercise strategies for him to continue to loses weight and he was very receptive to this. I also printed out some simple lifestyle changes that he can make in his diet to help him lose weight. Counseled regarding diet and exercise. Counseled regarding medication compliance. Finally, I recommended that he continue his other medications and follow up with you as previously scheduled. FOLLOW UP:   I told Mr. Rolo Carcamo to call my office if he had any problems, but otherwise I asked him to Return in about 6 months (around 2/21/2020). However, I would be happy to see him sooner should the need arise. Sincerely,  Aisha Singer MD, F.A.C.C. [facility] Cardiology Specialist    [address] [place] [address]  Phone: [phone], Fax: [phone]     I believe that the risk of significant morbidity and mortality related to the patient's current medical conditions are: Intermediate. The documentation recorded by the scribe, accurately and completely reflects the services I personally performed and the decisions made by me. Aisha Singer MD, F.A.C.C.  August 21, 2019

## 2019-08-21 NOTE — PATIENT INSTRUCTIONS
SURVEY:    You may be receiving a survey from [employer] regarding your visit today. Please complete the survey to enable us to provide the highest quality of care to you and your family. If you cannot score us a very good on any question, please call the office to discuss how we could have made your experience a very good one. Thank you.

## 2019-11-12 ENCOUNTER — HOSPITAL ENCOUNTER (OUTPATIENT)
Age: 64
Discharge: HOME | End: 2019-11-12
Payer: MEDICARE

## 2019-11-12 DIAGNOSIS — R97.20 ELEVATED PSA: ICD-10-CM

## 2019-11-12 LAB — PROSTATE SPECIFIC ANTIGEN: 4.05 UG/L

## 2019-11-12 PROCEDURE — 36415 COLL VENOUS BLD VENIPUNCTURE: CPT

## 2019-11-12 PROCEDURE — 84153 ASSAY OF PSA TOTAL: CPT

## 2019-11-13 ENCOUNTER — OFFICE VISIT (OUTPATIENT)
Dept: UROLOGY | Age: 64
End: 2019-11-13
Payer: MEDICARE

## 2019-11-13 VITALS
SYSTOLIC BLOOD PRESSURE: 116 MMHG | BODY MASS INDEX: 39.99 KG/M2 | HEIGHT: 69 IN | WEIGHT: 270 LBS | HEART RATE: 57 BPM | DIASTOLIC BLOOD PRESSURE: 72 MMHG

## 2019-11-13 DIAGNOSIS — N13.8 BPH WITH OBSTRUCTION/LOWER URINARY TRACT SYMPTOMS: ICD-10-CM

## 2019-11-13 DIAGNOSIS — N40.1 BPH WITH OBSTRUCTION/LOWER URINARY TRACT SYMPTOMS: ICD-10-CM

## 2019-11-13 DIAGNOSIS — R97.20 ELEVATED PSA: Primary | ICD-10-CM

## 2019-11-13 PROCEDURE — 1036F TOBACCO NON-USER: CPT | Performed by: UROLOGY

## 2019-11-13 PROCEDURE — G8484 FLU IMMUNIZE NO ADMIN: HCPCS | Performed by: UROLOGY

## 2019-11-13 PROCEDURE — G8427 DOCREV CUR MEDS BY ELIG CLIN: HCPCS | Performed by: UROLOGY

## 2019-11-13 PROCEDURE — G8598 ASA/ANTIPLAT THER USED: HCPCS | Performed by: UROLOGY

## 2019-11-13 PROCEDURE — 1123F ACP DISCUSS/DSCN MKR DOCD: CPT | Performed by: UROLOGY

## 2019-11-13 PROCEDURE — 99213 OFFICE O/P EST LOW 20 MIN: CPT | Performed by: UROLOGY

## 2019-11-13 PROCEDURE — 3017F COLORECTAL CA SCREEN DOC REV: CPT | Performed by: UROLOGY

## 2019-11-13 PROCEDURE — 4040F PNEUMOC VAC/ADMIN/RCVD: CPT | Performed by: UROLOGY

## 2019-11-13 PROCEDURE — G8417 CALC BMI ABV UP PARAM F/U: HCPCS | Performed by: UROLOGY

## 2019-11-13 ASSESSMENT — ENCOUNTER SYMPTOMS
COLOR CHANGE: 0
EYE REDNESS: 0
WHEEZING: 0
COUGH: 0
EYE PAIN: 0
BACK PAIN: 0
VOMITING: 0
ABDOMINAL PAIN: 0
SHORTNESS OF BREATH: 0
NAUSEA: 0

## 2020-02-19 ENCOUNTER — HOSPITAL ENCOUNTER (OUTPATIENT)
Age: 65
Discharge: HOME | End: 2020-02-19
Payer: MEDICARE

## 2020-02-19 LAB
ABSOLUTE EOS #: 0.27 K/UL (ref 0–0.44)
ABSOLUTE IMMATURE GRANULOCYTE: <0.03 K/UL (ref 0–0.3)
ABSOLUTE LYMPH #: 1.22 K/UL (ref 1.1–3.7)
ABSOLUTE MONO #: 0.42 K/UL (ref 0.1–1.2)
ALBUMIN SERPL-MCNC: 4.2 G/DL (ref 3.5–5.2)
ALBUMIN/GLOBULIN RATIO: 1.6 (ref 1–2.5)
ALP BLD-CCNC: 70 U/L (ref 40–129)
ALT SERPL-CCNC: 37 U/L (ref 5–41)
ANION GAP SERPL CALCULATED.3IONS-SCNC: 12 MMOL/L (ref 9–17)
AST SERPL-CCNC: 25 U/L
BASOPHILS # BLD: 1 % (ref 0–2)
BASOPHILS ABSOLUTE: 0.05 K/UL (ref 0–0.2)
BILIRUB SERPL-MCNC: 0.73 MG/DL (ref 0.3–1.2)
BUN BLDV-MCNC: 14 MG/DL (ref 8–23)
BUN/CREAT BLD: 18 (ref 9–20)
CALCIUM SERPL-MCNC: 9.2 MG/DL (ref 8.6–10.4)
CHLORIDE BLD-SCNC: 97 MMOL/L (ref 98–107)
CHOLESTEROL/HDL RATIO: 2.3
CHOLESTEROL: 116 MG/DL
CO2: 27 MMOL/L (ref 20–31)
CREAT SERPL-MCNC: 0.78 MG/DL (ref 0.7–1.2)
DIFFERENTIAL TYPE: ABNORMAL
EOSINOPHILS RELATIVE PERCENT: 5 % (ref 1–4)
GFR AFRICAN AMERICAN: >60 ML/MIN
GFR NON-AFRICAN AMERICAN: >60 ML/MIN
GFR SERPL CREATININE-BSD FRML MDRD: ABNORMAL ML/MIN/{1.73_M2}
GFR SERPL CREATININE-BSD FRML MDRD: ABNORMAL ML/MIN/{1.73_M2}
GLUCOSE BLD-MCNC: 115 MG/DL (ref 70–99)
HCT VFR BLD CALC: 46.7 % (ref 40.7–50.3)
HDLC SERPL-MCNC: 50 MG/DL
HEMOGLOBIN: 15 G/DL (ref 13–17)
IMMATURE GRANULOCYTES: 0 %
LDL CHOLESTEROL: 56 MG/DL (ref 0–130)
LYMPHOCYTES # BLD: 23 % (ref 24–43)
MCH RBC QN AUTO: 30.7 PG (ref 25.2–33.5)
MCHC RBC AUTO-ENTMCNC: 32.1 G/DL (ref 28.4–34.8)
MCV RBC AUTO: 95.5 FL (ref 82.6–102.9)
MONOCYTES # BLD: 8 % (ref 3–12)
NRBC AUTOMATED: 0 PER 100 WBC
PDW BLD-RTO: 13.8 % (ref 11.8–14.4)
PLATELET # BLD: 190 K/UL (ref 138–453)
PLATELET ESTIMATE: ABNORMAL
PMV BLD AUTO: 9.5 FL (ref 8.1–13.5)
POTASSIUM SERPL-SCNC: 4.3 MMOL/L (ref 3.7–5.3)
RBC # BLD: 4.89 M/UL (ref 4.21–5.77)
RBC # BLD: ABNORMAL 10*6/UL
SEG NEUTROPHILS: 63 % (ref 36–65)
SEGMENTED NEUTROPHILS ABSOLUTE COUNT: 3.27 K/UL (ref 1.5–8.1)
SODIUM BLD-SCNC: 136 MMOL/L (ref 135–144)
TOTAL PROTEIN: 6.9 G/DL (ref 6.4–8.3)
TRIGL SERPL-MCNC: 52 MG/DL
VLDLC SERPL CALC-MCNC: NORMAL MG/DL (ref 1–30)
WBC # BLD: 5.3 K/UL (ref 3.5–11.3)
WBC # BLD: ABNORMAL 10*3/UL

## 2020-02-19 PROCEDURE — 36415 COLL VENOUS BLD VENIPUNCTURE: CPT

## 2020-02-19 PROCEDURE — 80053 COMPREHEN METABOLIC PANEL: CPT

## 2020-02-19 PROCEDURE — 80061 LIPID PANEL: CPT

## 2020-02-19 PROCEDURE — 85025 COMPLETE CBC W/AUTO DIFF WBC: CPT

## 2020-03-11 ENCOUNTER — OFFICE VISIT (OUTPATIENT)
Dept: CARDIOLOGY | Age: 65
End: 2020-03-11
Payer: MEDICARE

## 2020-03-11 VITALS
HEART RATE: 88 BPM | RESPIRATION RATE: 18 BRPM | HEIGHT: 69 IN | BODY MASS INDEX: 40.14 KG/M2 | WEIGHT: 271 LBS | OXYGEN SATURATION: 98 % | DIASTOLIC BLOOD PRESSURE: 68 MMHG | SYSTOLIC BLOOD PRESSURE: 123 MMHG

## 2020-03-11 PROCEDURE — G8484 FLU IMMUNIZE NO ADMIN: HCPCS | Performed by: INTERNAL MEDICINE

## 2020-03-11 PROCEDURE — 4040F PNEUMOC VAC/ADMIN/RCVD: CPT | Performed by: INTERNAL MEDICINE

## 2020-03-11 PROCEDURE — 99214 OFFICE O/P EST MOD 30 MIN: CPT | Performed by: INTERNAL MEDICINE

## 2020-03-11 PROCEDURE — G8417 CALC BMI ABV UP PARAM F/U: HCPCS | Performed by: INTERNAL MEDICINE

## 2020-03-11 PROCEDURE — 99212 OFFICE O/P EST SF 10 MIN: CPT | Performed by: INTERNAL MEDICINE

## 2020-03-11 PROCEDURE — 1123F ACP DISCUSS/DSCN MKR DOCD: CPT | Performed by: INTERNAL MEDICINE

## 2020-03-11 PROCEDURE — 3017F COLORECTAL CA SCREEN DOC REV: CPT | Performed by: INTERNAL MEDICINE

## 2020-03-11 PROCEDURE — 1036F TOBACCO NON-USER: CPT | Performed by: INTERNAL MEDICINE

## 2020-03-11 PROCEDURE — G8427 DOCREV CUR MEDS BY ELIG CLIN: HCPCS | Performed by: INTERNAL MEDICINE

## 2020-03-11 NOTE — PROGRESS NOTES
Ruby Woo am scribing for and in the presence of Clayton Mancia MD, F.A.C.C..    Patient: Cory Rosenberg  : 1955  Date of Visit: 2020    REASON FOR VISIT / CONSULTATION: Follow-up (HX:ASHD,HTN,HLD<obese Pt is here for follow up states he is doing well Denies:CP,SOB,lightheaded/dizziness,palpitations)      Dear Dr. Arthur Bansal MD    I had the pleasure of seeing Cory Rosenberg in my office today. Mr. Mounika Chamberlain is a 75 y.o. male with a history of atherosclerotic heart disease. 1-Mr. Mounika Chamberlain has history of coronary artery disease with 3 stents placed back in . Another stent in . With regard to symptoms prior to his PCI's, patient said he usually has epigastric pain and excessive fatigue. No clear history of myocardial infarction. No history of heart failure or significant arrhythmia.     2-He has history of hypertension, dyslipidemia and obstructive sleep apnea ( unable to use CPAP). No clear family history of premature CAD.     3-He is admitted to hospital on 2018 with left-sided chest heaviness. Pain occurred at rest.  No significant radiation. Not associated with any sweating or diaphoresis. Pain relieved in the emergency room after taking 2 sublingual nitroglycerin. 4-S/P Cardiac cath on 2018 95% in stent-restenosis in the proximal LAD with a 95% ostial stenosis in a fairly large D1 branch S/P DESx2 to mid LAD and PTCA-MATTHEW to ostial D1    5-EKG done today in office on 2019 showed sinus rhythm with APCs, no acute ischemic changes compared to prior ECGs. Mr. Mounika Chamberlain is here for a 6 month follow up. He states that he has been doing good since last visit. He denies any chest pain, pressure or tightness. He denies any shortness of breath, lightheaded/dizziness or any palpitations. He denies any problems with current medications. Weight is stable. He sleeps well other than having hip pain.   As a stated above, he has sleep apnea but cannot use a CPAP machine. Recent lipid panel reviewed, LDL is 56 as of 2020. Past Medical History:   Diagnosis Date    CAD (coronary artery disease)     s/p multiple stents     History of DVT (deep vein thrombosis)     right calf DVT    Hyperlipidemia     Hypertension     Morbid obesity ([address]        CURRENT ALLERGIES: Patient has no known allergies. REVIEW OF SYSTEMS: 14 systems were reviewed. Pertinent positives and negatives as above, all else negative. Past Surgical History:   Procedure Laterality Date    CARDIAC CATHETERIZATION Left 2018    [facility]/95% in stent-restenosis in the proximal LAD with a 95% ostial 95% stenosis in a fairly large D1 branch that appears to be jailed.  Normal LVEDP    COLONOSCOPY  2019    COLONOSCOPY N/A 2019    COLONOSCOPY POLYPECTOMY SNARE/COLD BIOPSY performed by Jocelynn Pereira DO at [address] [place] [address]      4 stents placed- 7 total    KNEE SURGERY      SHOULDER SURGERY      Social History:  Social History     Tobacco Use    Smoking status: Former Smoker     Packs/day: 0.05     Years: 25.00     Pack years: 1.25     Last attempt to quit: 1995     Years since quittin.5    Smokeless tobacco: Never Used    Tobacco comment: quit 20 years ago   Substance Use Topics    Alcohol use: Yes     Comment: occas    Drug use: No        CURRENT MEDICATIONS:  Outpatient Medications Marked as Taking for the 3/11/20 encounter (Office Visit) with Zahraa Sotomayor MD   Medication Sig Dispense Refill    carvedilol (COREG) 3.125 MG tablet Take 1 tablet by mouth 2 times daily (with meals) 180 tablet 3    ticagrelor (BRILINTA) 60 MG TABS tablet Take 1 tablet by mouth 2 times daily 90 tablet 3    ezetimibe (ZETIA) 10 MG tablet TAKE ONE TABLET BY MOUTH DAILY 90 tablet 3    nitroGLYCERIN (NITROLINGUAL) 0.4 MG/SPRAY 0.4 mg spray Place 2 sprays under the tongue every 5 minutes as needed for Chest pain

## 2020-04-24 RX ORDER — TICAGRELOR 60 MG/1
TABLET ORAL
Qty: 60 TABLET | Refills: 2 | Status: SHIPPED | OUTPATIENT
Start: 2020-04-24 | End: 2020-07-27

## 2020-08-19 ENCOUNTER — HOSPITAL ENCOUNTER (OUTPATIENT)
Dept: ULTRASOUND IMAGING | Age: 65
Discharge: HOME | End: 2020-08-21
Payer: MEDICARE

## 2020-08-19 PROCEDURE — 76705 ECHO EXAM OF ABDOMEN: CPT

## 2020-09-14 ENCOUNTER — OFFICE VISIT (OUTPATIENT)
Dept: CARDIOLOGY | Age: 65
End: 2020-09-14
Payer: MEDICARE

## 2020-09-14 VITALS
OXYGEN SATURATION: 97 % | BODY MASS INDEX: 39.55 KG/M2 | DIASTOLIC BLOOD PRESSURE: 63 MMHG | WEIGHT: 267 LBS | HEART RATE: 51 BPM | RESPIRATION RATE: 18 BRPM | HEIGHT: 69 IN | SYSTOLIC BLOOD PRESSURE: 106 MMHG

## 2020-09-14 PROCEDURE — 93005 ELECTROCARDIOGRAM TRACING: CPT | Performed by: INTERNAL MEDICINE

## 2020-09-14 PROCEDURE — 99214 OFFICE O/P EST MOD 30 MIN: CPT | Performed by: INTERNAL MEDICINE

## 2020-09-14 PROCEDURE — 93010 ELECTROCARDIOGRAM REPORT: CPT | Performed by: INTERNAL MEDICINE

## 2020-09-14 PROCEDURE — G8427 DOCREV CUR MEDS BY ELIG CLIN: HCPCS | Performed by: INTERNAL MEDICINE

## 2020-09-14 PROCEDURE — 1036F TOBACCO NON-USER: CPT | Performed by: INTERNAL MEDICINE

## 2020-09-14 PROCEDURE — G8417 CALC BMI ABV UP PARAM F/U: HCPCS | Performed by: INTERNAL MEDICINE

## 2020-09-14 PROCEDURE — 1123F ACP DISCUSS/DSCN MKR DOCD: CPT | Performed by: INTERNAL MEDICINE

## 2020-09-14 PROCEDURE — 4040F PNEUMOC VAC/ADMIN/RCVD: CPT | Performed by: INTERNAL MEDICINE

## 2020-09-14 PROCEDURE — 3017F COLORECTAL CA SCREEN DOC REV: CPT | Performed by: INTERNAL MEDICINE

## 2020-09-14 PROCEDURE — 99211 OFF/OP EST MAY X REQ PHY/QHP: CPT | Performed by: INTERNAL MEDICINE

## 2020-09-14 NOTE — PATIENT INSTRUCTIONS
SURVEY:    You may be receiving a survey from [employer] regarding your visit today. Please complete the survey to enable us to provide the highest quality of care to you and your family. If you cannot score us a very good on any question, please call the office to discuss how we could have made your experience a very good one. Thank you. Rob Stein was your MA today!

## 2020-09-14 NOTE — PROGRESS NOTES
Cecile Favre am scribing for and in the presence of Luis Alberto Mi MD, F.A.C.C. Patient: Karsten Wilhelm  : 1955  Date of Visit: 2020    REASON FOR VISIT / CONSULTATION: 6 Month Follow-Up (HX: CAD, HTN, HLD, Obesity. Pt states he is doing ok. Denies: CP, Palpitaions, Lighteaded/dizziness, SOB same.)      Dear Dr. Asya Vyas MD    I had the pleasure of seeing Karsten Wilhelm in my office today. Mr. Lana Haro is a 77 y.o. male with a history of atherosclerotic heart disease. 1-Mr. Lana Haro has history of coronary artery disease with 3 stents placed back in . Another stent in . With regard to symptoms prior to his PCI's, patient said he usually has epigastric pain and excessive fatigue. No clear history of myocardial infarction. No history of heart failure or significant arrhythmia.     2-He has history of hypertension, dyslipidemia and obstructive sleep apnea ( unable to use CPAP). No clear family history of premature CAD.     3-He is admitted to hospital on 2018 with left-sided chest heaviness. Pain occurred at rest.  No significant radiation. Not associated with any sweating or diaphoresis. Pain relieved in the emergency room after taking 2 sublingual nitroglycerin. 4-S/P Cardiac cath on 2018 95% in stent-restenosis in the proximal LAD with a 95% ostial stenosis in a fairly large D1 branch S/P DESx2 to mid LAD and PTCA-MATTHEW to ostial D1    5-EKG done in office on 2019 showed sinus rhythm with APCs, no acute ischemic changes compared to prior ECGs. Mr. Lana Haro is here for his 6 month follow up today. He states that he has been doing good since last visit. He denies any chest pain, pressure or tightness. He denies any shortness of breath, lightheaded/dizziness or any palpitations. He denies any problems with current medications. Weight is stable. He sleeps well at night however at times his sinuses can keep him up.  He stays active by playing golf (NITROLINGUAL) 0.4 MG/SPRAY 0.4 mg spray Place 2 sprays under the tongue every 5 minutes as needed for Chest pain 1 Bottle 3    lisinopril (PRINIVIL;ZESTRIL) 10 MG tablet Take 10 mg by mouth daily      atorvastatin (LIPITOR) 80 MG tablet Take 80 mg by mouth daily      hydrochlorothiazide (HYDRODIURIL) 25 MG tablet Take 25 mg by mouth daily.  aspirin 81 MG EC tablet Take 81 mg by mouth daily. FAMILY HISTORY: family history includes Kidney Disease in his father. PHYSICAL EXAM:   /63 (Site: Left Upper Arm, Position: Sitting, Cuff Size: Medium Adult)   Pulse 51   Resp 18   Ht 5' 9\" (1.753 m)   Wt 267 lb (121.1 kg)   SpO2 97%   BMI 39.43 kg/m²  Body mass index is 39.43 kg/m². Constitutional: He is oriented to person, place, and time. He appears well-developed and well-nourished. In no acute distress. HEENT: Normocephalic and atraumatic. No JVD present. Carotid bruit is not present. No mass and no thyromegaly present. No lymphadenopathy present. Cardiovascular: Normal rate, regular rhythm, normal heart sounds. Exam reveals no gallop and no friction rubs. No heart murmur heard. Pulmonary/Chest: Effort normal and breath sounds normal. No respiratory distress. He has no wheezes, rhonchi or rales. Abdominal: Soft, non-tender. Bowel sounds and aorta are normal. He exhibits no organomegaly, mass or bruit. Extremities: 1+ lower extremity pitting pedal edema. 1/2 way up to the knees. No cyanosis and no clubbing. Pulses are 2+ radial and carotid pulses. 2+ dorsalis pedis and posterior tibial pulses bilaterally. Neurological: He is alert and oriented to person, place, and time. No evidence of gross cranial nerve deficit. Coordination appeared normal.  Chronic vascular changes. Skin: Skin is warm and dry. There is no rash or diaphoresis. Psychiatric: He has a normal mood and affect.  His speech is normal and behavior is normal.        MOST RECENT LABS ON RECORD:   Lab Results   Component on 2/2020 was 56 mg/dL   · Statin Therapy: Continue atorvastatin (Lipitor) 80 mg nightly. Continue Zetia 10 mg daily. Obesity: Body mass index is 39.43 kg/m². I also briefly discussed both diet and exercise strategies for him to continue to loses weight and he was very receptive to this. ·     Finally, I recommended that he continue his other medications and follow up with you as previously scheduled. FOLLOW UP:   I told Mr. Wing Merritt to call my office if he had any problems, but otherwise I asked him to Return in about 6 months (around 3/14/2021). However, I would be happy to see him sooner should the need arise. Sincerely,  Bozena Flores MD, F.A.C.C. [facility] Cardiology Specialist     [address]  Phone: [phone], Fax: [phone]     I believe that the risk of significant morbidity and mortality related to the patient's current medical conditions are: intermediate-high. The documentation recorded by the scribe, accurately and completely reflects the services I personally performed and the decisions made by me. Bozena Flores MD, F.A.C.C.  September 14, 2020

## 2020-09-16 ENCOUNTER — HOSPITAL ENCOUNTER (OUTPATIENT)
Dept: NON INVASIVE DIAGNOSTICS | Age: 65
Discharge: HOME | End: 2020-09-16
Payer: MEDICARE

## 2020-09-16 LAB
LV EF: 60 %
LVEF MODALITY: NORMAL

## 2020-09-16 PROCEDURE — 93306 TTE W/DOPPLER COMPLETE: CPT

## 2020-09-17 ENCOUNTER — TELEPHONE (OUTPATIENT)
Dept: CARDIOLOGY | Age: 65
End: 2020-09-17

## 2020-11-09 ENCOUNTER — HOSPITAL ENCOUNTER (OUTPATIENT)
Age: 65
Discharge: HOME | End: 2020-11-09
Payer: MEDICARE

## 2020-11-09 ENCOUNTER — TELEPHONE (OUTPATIENT)
Dept: UROLOGY | Age: 65
End: 2020-11-09

## 2020-11-09 LAB — PROSTATE SPECIFIC ANTIGEN: 4.97 UG/L

## 2020-11-09 PROCEDURE — 84153 ASSAY OF PSA TOTAL: CPT

## 2020-11-09 PROCEDURE — 36415 COLL VENOUS BLD VENIPUNCTURE: CPT

## 2020-11-09 NOTE — TELEPHONE ENCOUNTER
Please call him and let him know that his PSA lab work that he had drawn is still being processed. Also the upcoming appointment is a his annual appointment we would like to see him in the office.

## 2020-11-09 NOTE — TELEPHONE ENCOUNTER
Carlin Heller wanted to know if his PSA was ok, does he really have to keep his appt (11/11/2020). Writer advised wife that since it is a yearly follow up he would more than likely have to keep it. She wanted to check anyway.       Health Maintenance   Topic Date Due    Hepatitis C screen  1955    DTaP/Tdap/Td vaccine (1 - Tdap) 06/20/1972    Shingles Vaccine (1 of 2) 06/20/2003    Pneumococcal 65+ years Vaccine (1 of 1 - PPSV23) 06/20/2018    Annual Wellness Visit (AWV)  06/21/2019    A1C test (Diabetic or Prediabetic)  02/12/2020    Flu vaccine (1) 09/01/2020    PSA counseling  11/12/2020    Lipid screen  02/19/2021    Potassium monitoring  02/19/2021    Creatinine monitoring  02/19/2021    Colon cancer screen colonoscopy  04/26/2024    AAA screen  Completed    Hepatitis A vaccine  Aged Out    Hepatitis B vaccine  Aged Out    Hib vaccine  Aged Out    Meningococcal (ACWY) vaccine  Aged Out             (applicable per patient's age: Cancer Screenings, Depression Screening, Fall Risk Screening, Immunizations)    Hemoglobin A1C (%)   Date Value   02/12/2019 5.7   02/06/2018 5.7   08/07/2017 5.5     LDL Cholesterol (mg/dL)   Date Value   02/19/2020 56     AST (U/L)   Date Value   02/19/2020 25     ALT (U/L)   Date Value   02/19/2020 37     BUN (mg/dL)   Date Value   02/19/2020 14      (goal A1C is < 7)   (goal LDL is <100) need 30-50% reduction from baseline     BP Readings from Last 3 Encounters:   09/14/20 106/63   03/11/20 123/68   11/13/19 116/72    (goal /80)      All Future Testing planned in [facility]:  Lab Frequency Next Occurrence       Next Visit Date:  Future Appointments   Date Time Provider Brayan Varela   11/11/2020  8:00 AM El[facility] Rivera [facility]   3/15/2021  8:40 AM Lo Morris MD [facility]            Patient Active Problem List:     Chest pain     CAD (coronary artery disease)     Elevated PSA     Hyperlipidemia     Hypertension     H/O blood clots Unstable angina (HCC)     S/P drug eluting coronary stent placement     Diverticulosis of large intestine without hemorrhage

## 2020-11-11 ENCOUNTER — OFFICE VISIT (OUTPATIENT)
Dept: UROLOGY | Age: 65
End: 2020-11-11
Payer: MEDICARE

## 2020-11-11 VITALS
WEIGHT: 271 LBS | TEMPERATURE: 97.5 F | DIASTOLIC BLOOD PRESSURE: 68 MMHG | SYSTOLIC BLOOD PRESSURE: 118 MMHG | BODY MASS INDEX: 40.14 KG/M2 | HEIGHT: 69 IN

## 2020-11-11 PROCEDURE — G8427 DOCREV CUR MEDS BY ELIG CLIN: HCPCS | Performed by: PHYSICIAN ASSISTANT

## 2020-11-11 PROCEDURE — 99213 OFFICE O/P EST LOW 20 MIN: CPT | Performed by: PHYSICIAN ASSISTANT

## 2020-11-11 PROCEDURE — 99211 OFF/OP EST MAY X REQ PHY/QHP: CPT | Performed by: PHYSICIAN ASSISTANT

## 2020-11-11 PROCEDURE — G8484 FLU IMMUNIZE NO ADMIN: HCPCS | Performed by: PHYSICIAN ASSISTANT

## 2020-11-11 PROCEDURE — G8417 CALC BMI ABV UP PARAM F/U: HCPCS | Performed by: PHYSICIAN ASSISTANT

## 2020-11-11 PROCEDURE — 4040F PNEUMOC VAC/ADMIN/RCVD: CPT | Performed by: PHYSICIAN ASSISTANT

## 2020-11-11 PROCEDURE — 1036F TOBACCO NON-USER: CPT | Performed by: PHYSICIAN ASSISTANT

## 2020-11-11 PROCEDURE — 1123F ACP DISCUSS/DSCN MKR DOCD: CPT | Performed by: PHYSICIAN ASSISTANT

## 2020-11-11 PROCEDURE — 3017F COLORECTAL CA SCREEN DOC REV: CPT | Performed by: PHYSICIAN ASSISTANT

## 2020-11-11 ASSESSMENT — ENCOUNTER SYMPTOMS
NAUSEA: 0
EYE PAIN: 0
COLOR CHANGE: 0
VOMITING: 0
SHORTNESS OF BREATH: 0
EYE REDNESS: 0
ABDOMINAL PAIN: 0
CONSTIPATION: 0
BACK PAIN: 0
COUGH: 0
WHEEZING: 0

## 2020-11-11 NOTE — PROGRESS NOTES
HPI:    History  Referred for elevated PSA 5.35 on 1/6/16. 1 week prior to getting the PSA he fell and broke his tailbone. Has no family history of prostate cancer. Has minimal LUTS - only nocturia x 1 and this is not bothersome.     PSA  11/2020 - 4.97  11/2019 - 4.05  8/2018 - 3.83   11/2017 - 3.54   5/2017 - 4.72  3/2017 - 5.70  5/2016 - 3.67  1/2016 - 5.35   9/2013 - 1.71     Today:  Patient is here today for one-year follow-up elevated PSA and BPH. Patient's PSA has risen to 4.97. This is not as high as it is ever been. His highest PSA was 3/2017 which was 5.70. Denies any fever, chills, dysuria, hematuria. He denies any new or worsening lower urinary tract symptoms. He denies any unintentional weight loss over loss of appetite. He denies any new or worsening hip back or other pelvic pain. He denies any new or worsening lower extremity paresthesias or neuropathy. Past Medical History:   Diagnosis Date    CAD (coronary artery disease)     s/p multiple stents     History of DVT (deep vein thrombosis) 1990    right calf DVT    Hyperlipidemia     Hypertension     Morbid obesity ([facility] [address]      Past Surgical History:   Procedure Laterality Date    CARDIAC CATHETERIZATION Left 04/17/2018    Right radial/[facility]/Dr Wren/95% in stent-restenosis in the proximal LAD with a 95% ostial 95% stenosis in a fairly large D1 branch that appears to be jailed.  Normal LVEDP    COLONOSCOPY  04/26/2019    COLONOSCOPY N/A 4/26/2019    COLONOSCOPY POLYPECTOMY SNARE/COLD BIOPSY performed by Tania Ndiaye DO at [address] [place] [address]      4 stents placed- 7 total    KNEE SURGERY      SHOULDER SURGERY       Outpatient Encounter Medications as of 11/11/2020   Medication Sig Dispense Refill    BRILINTA 60 MG TABS tablet TAKE ONE TABLET BY MOUTH TWICE A  tablet 3    carvedilol (COREG) 3.125 MG tablet Take 1 tablet by mouth 2 times daily (with meals) 180 tablet 3    ezetimibe (ZETIA) 10 MG tablet TAKE ONE TABLET BY MOUTH DAILY 90 tablet 3    lisinopril (PRINIVIL;ZESTRIL) 10 MG tablet Take 10 mg by mouth daily      atorvastatin (LIPITOR) 80 MG tablet Take 80 mg by mouth daily      hydrochlorothiazide (HYDRODIURIL) 25 MG tablet Take 25 mg by mouth daily.  aspirin 81 MG EC tablet Take 81 mg by mouth daily.  nitroGLYCERIN (NITROLINGUAL) 0.4 MG/SPRAY 0.4 mg spray Place 2 sprays under the tongue every 5 minutes as needed for Chest pain (Patient not taking: Reported on 11/11/2020) 1 Bottle 3    albuterol (PROVENTIL HFA;VENTOLIN HFA) 108 (90 BASE) MCG/ACT inhaler Inhale 2 puffs into the lungs every 6 hours as needed for Wheezing or Shortness of Breath for 30 days. (Patient not taking: Reported on 8/21/2019) 1 Inhaler 11     Facility-Administered Encounter Medications as of 11/11/2020   Medication Dose Route Frequency Provider Last Rate Last Dose    iopamidol (ISOVUE-370) 76 % injection 90 mL  90 mL Intravenous ONCE PRN Zeeshan Vdial MD          Current Outpatient Medications on File Prior to Visit   Medication Sig Dispense Refill    BRILINTA 60 MG TABS tablet TAKE ONE TABLET BY MOUTH TWICE A  tablet 3    carvedilol (COREG) 3.125 MG tablet Take 1 tablet by mouth 2 times daily (with meals) 180 tablet 3    ezetimibe (ZETIA) 10 MG tablet TAKE ONE TABLET BY MOUTH DAILY 90 tablet 3    lisinopril (PRINIVIL;ZESTRIL) 10 MG tablet Take 10 mg by mouth daily      atorvastatin (LIPITOR) 80 MG tablet Take 80 mg by mouth daily      hydrochlorothiazide (HYDRODIURIL) 25 MG tablet Take 25 mg by mouth daily.  aspirin 81 MG EC tablet Take 81 mg by mouth daily.       nitroGLYCERIN (NITROLINGUAL) 0.4 MG/SPRAY 0.4 mg spray Place 2 sprays under the tongue every 5 minutes as needed for Chest pain (Patient not taking: Reported on 11/11/2020) 1 Bottle 3    albuterol (PROVENTIL HFA;VENTOLIN HFA) 108 (90 BASE) MCG/ACT inhaler Inhale 2 puffs into the lungs every 6 hours as needed for Wheezing or Shortness of Breath for 30 days. (Patient not taking: Reported on 2019) 1 Inhaler 11     Current Facility-Administered Medications on File Prior to Visit   Medication Dose Route Frequency Provider Last Rate Last Dose    iopamidol (ISOVUE-370) 76 % injection 90 mL  90 mL Intravenous ONCE PRN Lo Morris MD         Patient has no known allergies. Family History   Problem Relation Age of Onset    Kidney Disease Father      Social History     Tobacco Use   Smoking Status Former Smoker    Packs/day: 0.05    Years: 25.00    Pack years: 1.25    Last attempt to quit: 1995    Years since quittin.2   Smokeless Tobacco Never Used   Tobacco Comment    quit 20 years ago       Social History     Substance and Sexual Activity   Alcohol Use Yes    Comment: occas       Review of Systems   Constitutional: Negative for appetite change, chills and fever. Eyes: Negative for pain, redness and visual disturbance. Respiratory: Negative for cough, shortness of breath and wheezing. Cardiovascular: Negative for chest pain and leg swelling. Gastrointestinal: Negative for abdominal pain, constipation, nausea and vomiting. Genitourinary: Negative for decreased urine volume, difficulty urinating, discharge, dysuria, enuresis, flank pain, frequency, hematuria, penile pain, scrotal swelling, testicular pain and urgency. Positive for nocturia x1   Musculoskeletal: Negative for back pain, joint swelling and myalgias. Skin: Negative for color change, rash and wound. Neurological: Negative for dizziness, tremors and numbness. Hematological: Negative for adenopathy. Does not bruise/bleed easily. Temp 97.5 °F (36.4 °C) (Temporal)   Ht 5' 9\" (1.753 m)   Wt 271 lb (122.9 kg)   BMI 40.02 kg/m²       PHYSICAL EXAM:  Constitutional: Patient in no acute distress; Neuro: alert and oriented to person place and time.     Psych: Mood and affect normal.  Lungs: Respiratory effort normal  Cardiovascular:  Normal peripheral pulses  Abdomen: Soft, non-tender, non-distended with no CVA, flank pain  Bladder non-tender and not distended. Lymphatics: no palpable lymphadenopathy  Rectal: Anus and perineum normal   Normal rectal tone with no masses   Prostate soft, non-tender to palpation. No palpable nodules. Estimated 40 grams. Seminal vesicles not palpable. Lab Results   Component Value Date    BUN 14 02/19/2020     Lab Results   Component Value Date    CREATININE 0.78 02/19/2020     Lab Results   Component Value Date    PSA 4.97 (H) 11/09/2020    PSA 4.05 11/12/2019    PSA 3.83 08/08/2018       ASSESSMENT:   Diagnosis Orders   1. Elevated PSA  PSA, Diagnostic   2. BPH with obstruction/lower urinary tract symptoms           PLAN:  Patient does have an elevated PSA for his age.   But his current number is not as high as its been in the past.  We will check another PSA in 6 months    She is aware that if his PSA continues to climb we will need to discuss prostate biopsy    Follow-up in 6 months with a PSA    Will need a COREY in 2 years

## 2021-05-07 ENCOUNTER — TELEPHONE (OUTPATIENT)
Dept: UROLOGY | Age: 66
End: 2021-05-07

## 2021-05-07 NOTE — TELEPHONE ENCOUNTER
Left message for patient reminding him to get lab work completed prior to urology appointment 05/12/21.

## 2021-05-10 ENCOUNTER — OFFICE VISIT (OUTPATIENT)
Dept: CARDIOLOGY | Age: 66
End: 2021-05-10
Payer: MEDICARE

## 2021-05-10 VITALS
BODY MASS INDEX: 40.58 KG/M2 | RESPIRATION RATE: 18 BRPM | OXYGEN SATURATION: 97 % | HEART RATE: 73 BPM | WEIGHT: 274 LBS | DIASTOLIC BLOOD PRESSURE: 60 MMHG | HEIGHT: 69 IN | SYSTOLIC BLOOD PRESSURE: 96 MMHG

## 2021-05-10 DIAGNOSIS — I25.110 CORONARY ARTERY DISEASE INVOLVING NATIVE CORONARY ARTERY OF NATIVE HEART WITH UNSTABLE ANGINA PECTORIS (HCC): Primary | Chronic | ICD-10-CM

## 2021-05-10 DIAGNOSIS — E66.01 CLASS 3 SEVERE OBESITY WITH BODY MASS INDEX (BMI) OF 40.0 TO 44.9 IN ADULT, UNSPECIFIED OBESITY TYPE, UNSPECIFIED WHETHER SERIOUS COMORBIDITY PRESENT (HCC): ICD-10-CM

## 2021-05-10 DIAGNOSIS — I10 ESSENTIAL HYPERTENSION: Chronic | ICD-10-CM

## 2021-05-10 DIAGNOSIS — E78.2 MIXED HYPERLIPIDEMIA: Chronic | ICD-10-CM

## 2021-05-10 LAB — PROSTATE SPECIFIC ANTIGEN: 6.33 NG/ML

## 2021-05-10 PROCEDURE — 4040F PNEUMOC VAC/ADMIN/RCVD: CPT | Performed by: INTERNAL MEDICINE

## 2021-05-10 PROCEDURE — G8427 DOCREV CUR MEDS BY ELIG CLIN: HCPCS | Performed by: INTERNAL MEDICINE

## 2021-05-10 PROCEDURE — G8417 CALC BMI ABV UP PARAM F/U: HCPCS | Performed by: INTERNAL MEDICINE

## 2021-05-10 PROCEDURE — 99211 OFF/OP EST MAY X REQ PHY/QHP: CPT | Performed by: INTERNAL MEDICINE

## 2021-05-10 PROCEDURE — 99213 OFFICE O/P EST LOW 20 MIN: CPT | Performed by: INTERNAL MEDICINE

## 2021-05-10 PROCEDURE — 1036F TOBACCO NON-USER: CPT | Performed by: INTERNAL MEDICINE

## 2021-05-10 PROCEDURE — 3017F COLORECTAL CA SCREEN DOC REV: CPT | Performed by: INTERNAL MEDICINE

## 2021-05-10 PROCEDURE — 1123F ACP DISCUSS/DSCN MKR DOCD: CPT | Performed by: INTERNAL MEDICINE

## 2021-05-10 RX ORDER — CARVEDILOL 3.12 MG/1
3.12 TABLET ORAL 2 TIMES DAILY
Qty: 180 TABLET | Refills: 3 | Status: SHIPPED | OUTPATIENT
Start: 2021-05-10 | End: 2022-03-24 | Stop reason: SDUPTHER

## 2021-05-10 NOTE — PROGRESS NOTES
Maegan Bolden am scribing for and in the presence of Ceasar Greene MD, F.A.C.C. Patient: Nirmal Aceves  : 1955  Date of Visit: May 10, 2021    REASON FOR VISIT / CONSULTATION: 6 Month Follow-Up (HX: CAD S/p Stents, HTN, HLD, PAWEL, Obesity. Pt states he is doing well. Denies: CP, Palpiaiotns, Lighteaded/dizzziness, SOB. )      Dear Dr. Martínez Lafleur MD    I had the pleasure of seeing Nirmal Aceves in my office today. Mr. Sly Osuna is a 77 y.o. male with a history of atherosclerotic heart disease. 1-Mr. Sly Osuna has history of coronary artery disease with 3 stents placed back in . Another stent in . With regard to symptoms prior to his PCI's, patient said he usually has epigastric pain and excessive fatigue. No clear history of myocardial infarction. No history of heart failure or significant arrhythmia.     2-He has history of hypertension, dyslipidemia and obstructive sleep apnea ( unable to use CPAP). No clear family history of premature CAD.     3-He is admitted to hospital on 2018 with left-sided chest heaviness. Pain occurred at rest.  No significant radiation. Not associated with any sweating or diaphoresis. Pain relieved in the emergency room after taking 2 sublingual nitroglycerin. 4-S/P Cardiac cath on 2018 95% in stent-restenosis in the proximal LAD with a 95% ostial stenosis in a fairly large D1 branch S/P DESx2 to mid LAD and PTCA-MATTHEW to ostial D1    5-EKG done in office on 2019 showed sinus rhythm with APCs, no acute ischemic changes compared to prior ECGs. 6-echo on 2020: Normal ejection fraction and wall motion. No significant valvular abnormalities. Mr. Sly Osuna is here for follow up today. He states that he has been doing good since last visit. He denies any chest pain, pressure or tightness. He denies any shortness of breath, lightheaded/dizziness or any palpitations. He denies any problems with current medications. Weight is stable.  BRILINTA 60 MG TABS tablet TAKE ONE TABLET BY MOUTH TWICE A  tablet 3    ezetimibe (ZETIA) 10 MG tablet TAKE ONE TABLET BY MOUTH DAILY 90 tablet 3    nitroGLYCERIN (NITROLINGUAL) 0.4 MG/SPRAY 0.4 mg spray Place 2 sprays under the tongue every 5 minutes as needed for Chest pain 1 Bottle 3    lisinopril (PRINIVIL;ZESTRIL) 10 MG tablet Take 10 mg by mouth daily      atorvastatin (LIPITOR) 80 MG tablet Take 80 mg by mouth daily      hydrochlorothiazide (HYDRODIURIL) 25 MG tablet Take 25 mg by mouth daily.  aspirin 81 MG EC tablet Take 81 mg by mouth daily. FAMILY HISTORY: family history includes Kidney Disease in his father. PHYSICAL EXAM:   BP 96/60 (Site: Left Upper Arm, Position: Sitting, Cuff Size: Large Adult)   Pulse 73   Resp 18   Ht 5' 9\" (1.753 m)   Wt 274 lb (124.3 kg)   SpO2 97%   BMI 40.46 kg/m²  Body mass index is 40.46 kg/m². Constitutional: He is oriented to person, place, and time. He appears well-developed and well-nourished. In no acute distress. HEENT: Normocephalic and atraumatic. No JVD present. Carotid bruit is not present. No mass and no thyromegaly present. No lymphadenopathy present. Cardiovascular: Normal rate, regular rhythm, normal heart sounds. Exam reveals no gallop and no friction rubs. No heart murmur heard. Pulmonary/Chest: Effort normal and breath sounds normal. No respiratory distress. He has no wheezes, rhonchi or rales. Abdominal: Soft, non-tender. Bowel sounds and aorta are normal. He exhibits no organomegaly, mass or bruit. Extremities: Mild lower extremity edema. 1/2 way up to the knees. No cyanosis and no clubbing. Pulses are 2+ radial and carotid pulses. 2+ dorsalis pedis and posterior tibial pulses bilaterally. Chronic vascular changes. Neurological: He is alert and oriented to person, place, and time. No evidence of gross cranial nerve deficit. Coordination appeared normal.   Skin: Skin is warm and dry.  There is no rash or diaphoresis. Psychiatric: He has a normal mood and affect. His speech is normal and behavior is normal.        MOST RECENT LABS ON RECORD:   Lab Results   Component Value Date    WBC 5.3 02/19/2020    HGB 15.0 02/19/2020    HCT 46.7 02/19/2020     02/19/2020    CHOL 116 02/19/2020    TRIG 52 02/19/2020    HDL 50 02/19/2020    ALT 37 02/19/2020    AST 25 02/19/2020     02/19/2020    K 4.3 02/19/2020    CL 97 (L) 02/19/2020    CREATININE 0.78 02/19/2020    BUN 14 02/19/2020    CO2 27 02/19/2020    PSA 4.97 (H) 11/09/2020    INR 1.0 04/17/2018    LABA1C 5.7 02/12/2019       ASSESSMENT:  1. Coronary artery disease involving native coronary artery of native heart with unstable angina pectoris ([facility] [address]    2. Essential hypertension    3. Mixed hyperlipidemia    4. Class 3 severe obesity with body mass index (BMI) of 40.0 to 44.9 in adult, unspecified obesity type, unspecified whether serious comorbidity present ([facility] [address]       PLAN:  Atherosclerotic Heart Disease: S/P Multiple PCIs as outlined in HPI. Antiplatelet Agent: Continue aspirin 81 mg daily and Ticagrelor (Brilinta) 60 mg twice daily. Beta Blocker: Continue carvedilol (Coreg) 3.125 mg. twice daily. Statin Therapy: Continue atorvastatin (Lipitor) 80 mg nightly. And also continue Zetia 10 mg daily. Counseled patient extensively to come to the emergency room with he has persistent chest pain or worsening shortness of breath as this can be life threatening. I reviewed blood work that was done by Dr. Marques Bello MD \" scanned in media\" excellent lipid panel. Hemoglobin A1c 6.0%, kidney function is normal.  Serum potassium is normal.    Essential Hypertension: Controlled  ACE Inibitor/ARB: Continue lisinopril 10 mg once daily. Diuretics: Continue hydrochlorothiazide  25 mg once daily.  I told him to watch for any increased lightheadedness or dizziness and call if this develops   Beta Blocker: Continue carvedilol (Coreg)      · Hyperlipidemia: Mixed, LDL done on 3/1/2021 was 64 mg/dL   · Statin Therapy: Continue atorvastatin (Lipitor) 80 mg nightly. Continue Zetia 10 mg daily. Morbid obesity: Body mass index is 40.46 kg/m². I also briefly discussed both diet and exercise strategies for him to continue to loses weight and he was very receptive to this. Finally, I recommended that he continue his other medications and follow up with you as previously scheduled. FOLLOW UP:   I told Mr. Esha Judge to call my office if he had any problems, but otherwise I asked him to Return in about 1 year (around 5/10/2022). However, I would be happy to see him sooner should the need arise. Sincerely,  Alessandro Bowers MD, F.A.C.C. [facility] Cardiology Specialist    [address]  Phone: [phone], Fax: [phone]     I believe that the risk of significant morbidity and mortality related to the patient's current medical conditions are: Intermediate. The documentation recorded by the scribe, accurately and completely reflects the services I personally performed and the decisions made by me. Alessandro Bowers MD, F.A.C.C.  May 10, 2021

## 2021-05-10 NOTE — PATIENT INSTRUCTIONS
SURVEY:    You may be receiving a survey from [employer] regarding your visit today. Please complete the survey to enable us to provide the highest quality of care to you and your family. If you cannot score us a very good on any question, please call the office to discuss how we could have made your experience a very good one. Thank you. Teagan Marin was your MA today!

## 2021-05-11 DIAGNOSIS — R97.20 ELEVATED PSA: ICD-10-CM

## 2021-05-17 ENCOUNTER — OFFICE VISIT (OUTPATIENT)
Dept: UROLOGY | Age: 66
End: 2021-05-17
Payer: MEDICARE

## 2021-05-17 VITALS
HEART RATE: 57 BPM | DIASTOLIC BLOOD PRESSURE: 88 MMHG | HEIGHT: 69 IN | SYSTOLIC BLOOD PRESSURE: 132 MMHG | WEIGHT: 280 LBS | TEMPERATURE: 97.8 F | BODY MASS INDEX: 41.47 KG/M2

## 2021-05-17 DIAGNOSIS — N13.8 BPH WITH OBSTRUCTION/LOWER URINARY TRACT SYMPTOMS: ICD-10-CM

## 2021-05-17 DIAGNOSIS — N40.1 BPH WITH OBSTRUCTION/LOWER URINARY TRACT SYMPTOMS: ICD-10-CM

## 2021-05-17 DIAGNOSIS — R97.20 ELEVATED PSA: Primary | ICD-10-CM

## 2021-05-17 PROCEDURE — 1036F TOBACCO NON-USER: CPT | Performed by: PHYSICIAN ASSISTANT

## 2021-05-17 PROCEDURE — 4040F PNEUMOC VAC/ADMIN/RCVD: CPT | Performed by: PHYSICIAN ASSISTANT

## 2021-05-17 PROCEDURE — G8417 CALC BMI ABV UP PARAM F/U: HCPCS | Performed by: PHYSICIAN ASSISTANT

## 2021-05-17 PROCEDURE — 99211 OFF/OP EST MAY X REQ PHY/QHP: CPT

## 2021-05-17 PROCEDURE — 3017F COLORECTAL CA SCREEN DOC REV: CPT | Performed by: PHYSICIAN ASSISTANT

## 2021-05-17 PROCEDURE — G8427 DOCREV CUR MEDS BY ELIG CLIN: HCPCS | Performed by: PHYSICIAN ASSISTANT

## 2021-05-17 PROCEDURE — 99214 OFFICE O/P EST MOD 30 MIN: CPT | Performed by: PHYSICIAN ASSISTANT

## 2021-05-17 PROCEDURE — 1123F ACP DISCUSS/DSCN MKR DOCD: CPT | Performed by: PHYSICIAN ASSISTANT

## 2021-05-17 RX ORDER — LEVOFLOXACIN 500 MG/1
500 TABLET, FILM COATED ORAL DAILY
Qty: 10 TABLET | Refills: 0 | Status: SHIPPED | OUTPATIENT
Start: 2021-05-17 | End: 2021-05-27

## 2021-05-17 ASSESSMENT — ENCOUNTER SYMPTOMS
CONSTIPATION: 0
EYE REDNESS: 0
COLOR CHANGE: 0
ABDOMINAL PAIN: 0
WHEEZING: 0
VOMITING: 0
SHORTNESS OF BREATH: 0
BACK PAIN: 0
NAUSEA: 0
COUGH: 0

## 2021-05-17 NOTE — PROGRESS NOTES
days 10 tablet 0    ticagrelor (BRILINTA) 60 MG TABS tablet Take 1 tablet by mouth 2 times daily 180 tablet 3    carvedilol (COREG) 3.125 MG tablet Take 1 tablet by mouth 2 times daily 180 tablet 3    ezetimibe (ZETIA) 10 MG tablet TAKE ONE TABLET BY MOUTH DAILY 90 tablet 3    lisinopril (PRINIVIL;ZESTRIL) 10 MG tablet Take 10 mg by mouth daily      atorvastatin (LIPITOR) 80 MG tablet Take 80 mg by mouth daily      hydrochlorothiazide (HYDRODIURIL) 25 MG tablet Take 25 mg by mouth daily.  aspirin 81 MG EC tablet Take 81 mg by mouth daily.  nitroGLYCERIN (NITROLINGUAL) 0.4 MG/SPRAY 0.4 mg spray Place 2 sprays under the tongue every 5 minutes as needed for Chest pain (Patient not taking: Reported on 5/17/2021) 1 Bottle 3    albuterol (PROVENTIL HFA;VENTOLIN HFA) 108 (90 BASE) MCG/ACT inhaler Inhale 2 puffs into the lungs every 6 hours as needed for Wheezing or Shortness of Breath for 30 days. (Patient not taking: Reported on 8/21/2019) 1 Inhaler 11     Facility-Administered Encounter Medications as of 5/17/2021   Medication Dose Route Frequency Provider Last Rate Last Admin    iopamidol (ISOVUE-370) 76 % injection 90 mL  90 mL Intravenous ONCE PRN Alla Joe MD          Current Outpatient Medications on File Prior to Visit   Medication Sig Dispense Refill    ticagrelor (BRILINTA) 60 MG TABS tablet Take 1 tablet by mouth 2 times daily 180 tablet 3    carvedilol (COREG) 3.125 MG tablet Take 1 tablet by mouth 2 times daily 180 tablet 3    ezetimibe (ZETIA) 10 MG tablet TAKE ONE TABLET BY MOUTH DAILY 90 tablet 3    lisinopril (PRINIVIL;ZESTRIL) 10 MG tablet Take 10 mg by mouth daily      atorvastatin (LIPITOR) 80 MG tablet Take 80 mg by mouth daily      hydrochlorothiazide (HYDRODIURIL) 25 MG tablet Take 25 mg by mouth daily.  aspirin 81 MG EC tablet Take 81 mg by mouth daily.       nitroGLYCERIN (NITROLINGUAL) 0.4 MG/SPRAY 0.4 mg spray Place 2 sprays under the tongue every 5 minutes as needed for Chest pain (Patient not taking: Reported on 2021) 1 Bottle 3    albuterol (PROVENTIL HFA;VENTOLIN HFA) 108 (90 BASE) MCG/ACT inhaler Inhale 2 puffs into the lungs every 6 hours as needed for Wheezing or Shortness of Breath for 30 days. (Patient not taking: Reported on 2019) 1 Inhaler 11     Current Facility-Administered Medications on File Prior to Visit   Medication Dose Route Frequency Provider Last Rate Last Admin    iopamidol (ISOVUE-370) 76 % injection 90 mL  90 mL Intravenous ONCE PRN Mono Ibrahim MD         Patient has no known allergies. Family History   Problem Relation Age of Onset    Kidney Disease Father      Social History     Tobacco Use   Smoking Status Former Smoker    Packs/day: 0.05    Years: 25.00    Pack years: 1.25    Quit date: 1995    Years since quittin.7   Smokeless Tobacco Never Used   Tobacco Comment    quit 20 years ago       Social History     Substance and Sexual Activity   Alcohol Use Yes    Comment: occas       Review of Systems   Constitutional: Negative for appetite change, chills and fever. Eyes: Negative for redness and visual disturbance. Respiratory: Negative for cough, shortness of breath and wheezing. Cardiovascular: Negative for chest pain and leg swelling. Gastrointestinal: Negative for abdominal pain, constipation, nausea and vomiting. Genitourinary: Negative for decreased urine volume, difficulty urinating, discharge, dysuria, enuresis, flank pain, frequency, hematuria, penile pain, scrotal swelling, testicular pain and urgency. Positive for nocturia x1   Musculoskeletal: Negative for back pain, joint swelling and myalgias. Skin: Negative for color change, rash and wound. Neurological: Negative for dizziness, tremors and numbness. Hematological: Negative for adenopathy. Does not bruise/bleed easily.        /88 (Site: Right Upper Arm, Position: Sitting, Cuff Size: Large Adult)   Pulse 57   Temp 97.8 °F (36.6 °C)   Ht 5' 9\" (1.753 m)   Wt 280 lb (127 kg)   BMI 41.35 kg/m²       PHYSICAL EXAM:  Constitutional: Patient in no acute distress; Neuro: alert and oriented to person place and time. Psych: Mood and affect normal.  Lungs: Respiratory effort normal  Abdomen: Soft, non-tender, non-distended  Rectal: Deferred    Lab Results   Component Value Date    BUN 14 02/19/2020     Lab Results   Component Value Date    CREATININE 0.78 02/19/2020     Lab Results   Component Value Date    PSA 6.33 05/10/2021    PSA 4.97 (H) 11/09/2020    PSA 4.05 11/12/2019       ASSESSMENT:   Diagnosis Orders   1. Elevated PSA  levoFLOXacin (LEVAQUIN) 500 MG tablet    PSA, Diagnostic   2. BPH with obstruction/lower urinary tract symptoms       PLAN:  We did discuss with him that his PSA continues to rise. His PSA is now 6.33 which is higher than it ever been. We did give patient multiple options including course of antibiotics for possible prostatitis and repeating PSA in 4 to 6 weeks, proceeding with prostate biopsy, or rechecking his PSA in 3 months. We discussed that due to his elevated PSA 6.33. We recommend a prostate biopsy. The patient was told this was typically done with ultrasound guidance and a prostate block with local anesthesia to minimize the discomfort. I discussed the risks including infection, bleeding, hematospermia and rarely sepsis. He was encouraged to give himself a Fleets enema the night prior to the biopsy and to take his antibiotics as instructed. He was told to stop taking ASA and other blood thinners at least a week prior to the biopsy. He was told to go the ER if he experiences fever 100.1F or greater, chills or severe bleeding after the biopsy. Patient has decided to proceed with a 10-day course of Levaquin followed by PSA in 4 to 6 weeks.   He is aware that if his PSA remains elevated we will proceed with prostate biopsy    Follow-up in 4 to 6 weeks with PSA

## 2021-05-28 ENCOUNTER — HOSPITAL ENCOUNTER (EMERGENCY)
Age: 66
Discharge: HOME | End: 2021-05-28
Attending: EMERGENCY MEDICINE
Payer: MEDICARE

## 2021-05-28 ENCOUNTER — APPOINTMENT (OUTPATIENT)
Dept: VASCULAR LAB | Age: 66
End: 2021-05-28
Payer: MEDICARE

## 2021-05-28 VITALS
OXYGEN SATURATION: 95 % | DIASTOLIC BLOOD PRESSURE: 78 MMHG | SYSTOLIC BLOOD PRESSURE: 136 MMHG | HEART RATE: 59 BPM | TEMPERATURE: 97.2 F | RESPIRATION RATE: 16 BRPM

## 2021-05-28 DIAGNOSIS — M79.604 RIGHT LEG PAIN: Primary | ICD-10-CM

## 2021-05-28 DIAGNOSIS — M25.551 RIGHT HIP PAIN: ICD-10-CM

## 2021-05-28 LAB
ABSOLUTE EOS #: 0.14 K/UL (ref 0–0.44)
ABSOLUTE IMMATURE GRANULOCYTE: <0.03 K/UL (ref 0–0.3)
ABSOLUTE LYMPH #: 1.58 K/UL (ref 1.1–3.7)
ABSOLUTE MONO #: 0.41 K/UL (ref 0.1–1.2)
ANION GAP SERPL CALCULATED.3IONS-SCNC: 13 MMOL/L (ref 9–17)
BASOPHILS # BLD: 2 % (ref 0–2)
BASOPHILS ABSOLUTE: 0.07 K/UL (ref 0–0.2)
BUN BLDV-MCNC: 16 MG/DL (ref 8–23)
BUN/CREAT BLD: 23 (ref 9–20)
CALCIUM SERPL-MCNC: 9.8 MG/DL (ref 8.6–10.4)
CHLORIDE BLD-SCNC: 97 MMOL/L (ref 98–107)
CO2: 26 MMOL/L (ref 20–31)
CREAT SERPL-MCNC: 0.7 MG/DL (ref 0.7–1.2)
D-DIMER QUANTITATIVE: 0.77 MG/L FEU (ref 0–0.59)
DIFFERENTIAL TYPE: NORMAL
EOSINOPHILS RELATIVE PERCENT: 3 % (ref 1–4)
GFR AFRICAN AMERICAN: >60 ML/MIN
GFR NON-AFRICAN AMERICAN: >60 ML/MIN
GFR SERPL CREATININE-BSD FRML MDRD: ABNORMAL ML/MIN/{1.73_M2}
GFR SERPL CREATININE-BSD FRML MDRD: ABNORMAL ML/MIN/{1.73_M2}
GLUCOSE BLD-MCNC: 101 MG/DL (ref 70–99)
HCT VFR BLD CALC: 46.4 % (ref 40.7–50.3)
HEMOGLOBIN: 15.5 G/DL (ref 13–17)
IMMATURE GRANULOCYTES: 0 %
LYMPHOCYTES # BLD: 35 % (ref 24–43)
MCH RBC QN AUTO: 31.3 PG (ref 25.2–33.5)
MCHC RBC AUTO-ENTMCNC: 33.4 G/DL (ref 28.4–34.8)
MCV RBC AUTO: 93.7 FL (ref 82.6–102.9)
MONOCYTES # BLD: 9 % (ref 3–12)
NRBC AUTOMATED: 0 PER 100 WBC
PDW BLD-RTO: 14.2 % (ref 11.8–14.4)
PLATELET # BLD: 192 K/UL (ref 138–453)
PLATELET ESTIMATE: NORMAL
PMV BLD AUTO: 9.5 FL (ref 8.1–13.5)
POTASSIUM SERPL-SCNC: 3.8 MMOL/L (ref 3.7–5.3)
RBC # BLD: 4.95 M/UL (ref 4.21–5.77)
RBC # BLD: NORMAL 10*6/UL
SEG NEUTROPHILS: 51 % (ref 36–65)
SEGMENTED NEUTROPHILS ABSOLUTE COUNT: 2.34 K/UL (ref 1.5–8.1)
SODIUM BLD-SCNC: 136 MMOL/L (ref 135–144)
WBC # BLD: 4.6 K/UL (ref 3.5–11.3)
WBC # BLD: NORMAL 10*3/UL

## 2021-05-28 PROCEDURE — 93971 EXTREMITY STUDY: CPT

## 2021-05-28 PROCEDURE — 99284 EMERGENCY DEPT VISIT MOD MDM: CPT

## 2021-05-28 PROCEDURE — 85025 COMPLETE CBC W/AUTO DIFF WBC: CPT

## 2021-05-28 PROCEDURE — 6370000000 HC RX 637 (ALT 250 FOR IP): Performed by: EMERGENCY MEDICINE

## 2021-05-28 PROCEDURE — 80048 BASIC METABOLIC PNL TOTAL CA: CPT

## 2021-05-28 PROCEDURE — 85379 FIBRIN DEGRADATION QUANT: CPT

## 2021-05-28 RX ORDER — ETODOLAC 400 MG/1
400 TABLET, FILM COATED ORAL 2 TIMES DAILY
Qty: 30 TABLET | Refills: 0 | Status: ON HOLD | OUTPATIENT
Start: 2021-05-28 | End: 2022-03-08

## 2021-05-28 RX ORDER — OXYCODONE HYDROCHLORIDE AND ACETAMINOPHEN 5; 325 MG/1; MG/1
1 TABLET ORAL ONCE
Status: COMPLETED | OUTPATIENT
Start: 2021-05-28 | End: 2021-05-28

## 2021-05-28 RX ADMIN — OXYCODONE HYDROCHLORIDE AND ACETAMINOPHEN 1 TABLET: 5; 325 TABLET ORAL at 13:59

## 2021-05-28 ASSESSMENT — PAIN DESCRIPTION - PAIN TYPE
TYPE: ACUTE PAIN
TYPE: ACUTE PAIN

## 2021-05-28 ASSESSMENT — ENCOUNTER SYMPTOMS
ABDOMINAL PAIN: 0
CONSTIPATION: 0
BACK PAIN: 0
DIARRHEA: 0
CHOKING: 0
NAUSEA: 0
ABDOMINAL DISTENTION: 0
SHORTNESS OF BREATH: 0
COUGH: 0
VOMITING: 0

## 2021-05-28 ASSESSMENT — PAIN DESCRIPTION - ORIENTATION
ORIENTATION: RIGHT
ORIENTATION: RIGHT

## 2021-05-28 ASSESSMENT — PAIN DESCRIPTION - DESCRIPTORS
DESCRIPTORS: DISCOMFORT
DESCRIPTORS: ACHING;THROBBING

## 2021-05-28 ASSESSMENT — PAIN DESCRIPTION - LOCATION
LOCATION: LEG
LOCATION: LEG

## 2021-05-28 ASSESSMENT — PAIN SCALES - GENERAL
PAINLEVEL_OUTOF10: 3
PAINLEVEL_OUTOF10: 8
PAINLEVEL_OUTOF10: 6

## 2021-05-28 ASSESSMENT — PAIN DESCRIPTION - PROGRESSION: CLINICAL_PROGRESSION: GRADUALLY IMPROVING

## 2021-05-28 NOTE — ED PROVIDER NOTES
[facility] ED  EMERGENCY DEPARTMENT ENCOUNTER      Pt Name:Kenny Frausto  MRN: [id number]  [place] 1955  Date of evaluation: 5/28/2021  Provider: Orquidea Florence MD    [address] [place] [address]     Chief Complaint   Patient presents with    Leg Pain     right; ongoing for approx 2 wks and worsening , states \"it feels like when I had a blood clot before\"         HISTORY OF PRESENT ILLNESS   (Location/Symptom, Timing/Onset, Context/Setting, Quality, Duration, Modifying Factors, Severity)  Note limiting factors. HPI the patient is a 40-year-old male who has a 2-week history of worsening right leg pain. He states it feels a lot like when he had a blood clot in that leg. He is rating his pain at a level 5. Nothing is really making the pain better or worse. The pain is dull. Nursing Notes were reviewed. REVIEW OF SYSTEMS    (2-9 systems for level 4, 10 or more for level 5)     Review of Systems   Constitutional: Positive for activity change. Respiratory: Negative for cough, choking and shortness of breath. Cardiovascular: Positive for leg swelling. Negative for chest pain and palpitations. Gastrointestinal: Negative for abdominal distention, abdominal pain, constipation, diarrhea, nausea and vomiting. Musculoskeletal: Positive for gait problem. Negative for back pain. Skin: Negative. Neurological: Negative for dizziness and headaches. Psychiatric/Behavioral: Negative for confusion, decreased concentration and dysphoric mood.               MEDICAL HISTORY     Past Medical History:   Diagnosis Date    CAD (coronary artery disease)     s/p multiple stents     History of DVT (deep vein thrombosis) 1990    right calf DVT    Hyperlipidemia     Hypertension     Morbid obesity ([address]          SURGICAL HISTORY       Past Surgical History:   Procedure Laterality Date    CARDIAC CATHETERIZATION Left 04/17/2018    Right radial/[facility]/Dr Wren/95% in stent-restenosis in Determinants of Health     Financial Resource Strain:     Difficulty of Paying Living Expenses:    Food Insecurity:     Worried About Running Out of Food in the Last Year:     [address] in the Last Year:    Transportation Needs:     Lack of Transportation (Medical):  Lack of Transportation (Non-Medical):    Physical Activity:     Days of Exercise per Week:     Minutes of Exercise per Session:    Stress:     Feeling of Stress :    Social Connections:     Frequency of Communication with Friends and Family:     Frequency of Social Gatherings with Friends and Family:     Attends Muslim Services:     Active Member of Clubs or Organizations:     Attends Club or Organization Meetings:     Marital Status:    Intimate Partner Violence:     Fear of Current or Ex-Partner:     Emotionally Abused:     Physically Abused:     Sexually Abused:        SCREENINGS             PHYSICAL EXAM  (up to 7 for level 4, 8 or more for level 5)     ED Triage Vitals [05/28/21 1231]   BP Temp Temp Source Pulse Resp SpO2 Height Weight   126/80 97.2 °F (36.2 °C) Tympanic 60 16 96 % -- --       Physical Exam  Constitutional:       General: He is not in acute distress. Appearance: Normal appearance. He is obese. He is not ill-appearing, toxic-appearing or diaphoretic. HENT:      Head: Normocephalic and atraumatic. Eyes:      Extraocular Movements: Extraocular movements intact. Conjunctiva/sclera: Conjunctivae normal.      Pupils: Pupils are equal, round, and reactive to light. Cardiovascular:      Rate and Rhythm: Regular rhythm. Bradycardia present. Pulses: Normal pulses. Heart sounds: Normal heart sounds. Pulmonary:      Effort: Pulmonary effort is normal.      Breath sounds: Normal breath sounds. Abdominal:      General: Abdomen is flat. Bowel sounds are normal. There is no distension. Palpations: Abdomen is soft. Tenderness: There is no abdominal tenderness.    Musculoskeletal: General: Tenderness present. No signs of injury. Normal range of motion. Cervical back: Normal range of motion and neck supple. Skin:     General: Skin is warm and dry. Neurological:      General: No focal deficit present. Mental Status: He is alert and oriented to person, place, and time. Mental status is at baseline. Psychiatric:         Mood and Affect: Mood normal.         Behavior: Behavior normal.         Thought Content: Thought content normal.         Judgment: Judgment normal.         DIAGNOSTIC RESULTS     EKG: All EKG's are interpreted by the Emergency Department Physician whoeither signs or Co-signs this chart in the absence of a cardiologist.      RADIOLOGY:   Non-plain film images such as CT, Ultrasound and MRI are read by the radiologist. Plain radiographic images are visualized and preliminarily interpreted by the emergency physician     Interpretation per the Radiologist below, if available at the time of this note:    VL DUP LOWER EXTREMITY VENOUS RIGHT   Final Result            ED BEDSIDE ULTRASOUND:   Performed by ED Physician - none    LABS:  Labs Reviewed   BASIC METABOLIC PANEL - Abnormal; Notable for the following components:       Result Value    Glucose 101 (*)     Bun/Cre Ratio 23 (*)     Chloride 97 (*)     All other components within normal limits   D-DIMER, QUANTITATIVE - Abnormal; Notable for the following components:    D-Dimer, Quant 0.77 (*)     All other components within normal limits   CBC WITH AUTO DIFFERENTIAL       EMERGENCY DEPARTMENT COURSE and DIFFERENTIAL DIAGNOSIS/MDM:   Vitals:    Vitals:    05/28/21 1231 05/28/21 1523   BP: 126/80 136/78   Pulse: 60 59   Resp: 16 16   Temp: 97.2 °F (36.2 °C)    TempSrc: Tympanic    SpO2: 96% 95%           MDM the patient will be given medication for his hip pain. Nonnarcotic. CONSULTS:  None    PROCEDURES:  Unless otherwise noted below, none     Procedures    FINAL IMPRESSION      1. Right leg pain    2.  Right

## 2021-07-05 ENCOUNTER — HOSPITAL ENCOUNTER (OUTPATIENT)
Age: 66
Discharge: HOME | End: 2021-07-05
Payer: MEDICARE

## 2021-07-05 DIAGNOSIS — R97.20 ELEVATED PSA: ICD-10-CM

## 2021-07-05 LAB — PROSTATE SPECIFIC ANTIGEN: 6.73 UG/L

## 2021-07-05 PROCEDURE — 36415 COLL VENOUS BLD VENIPUNCTURE: CPT

## 2021-07-05 PROCEDURE — 84153 ASSAY OF PSA TOTAL: CPT

## 2021-07-08 ENCOUNTER — OFFICE VISIT (OUTPATIENT)
Dept: UROLOGY | Age: 66
End: 2021-07-08
Payer: MEDICARE

## 2021-07-08 ENCOUNTER — HOSPITAL ENCOUNTER (OUTPATIENT)
Age: 66
Setting detail: SPECIMEN
Discharge: HOME | End: 2021-07-08
Payer: MEDICARE

## 2021-07-08 VITALS
RESPIRATION RATE: 20 BRPM | DIASTOLIC BLOOD PRESSURE: 74 MMHG | SYSTOLIC BLOOD PRESSURE: 125 MMHG | HEART RATE: 61 BPM | BODY MASS INDEX: 39.93 KG/M2 | TEMPERATURE: 97.5 F | WEIGHT: 270.4 LBS

## 2021-07-08 DIAGNOSIS — N40.1 BPH WITH OBSTRUCTION/LOWER URINARY TRACT SYMPTOMS: ICD-10-CM

## 2021-07-08 DIAGNOSIS — R97.20 ELEVATED PSA: Primary | ICD-10-CM

## 2021-07-08 DIAGNOSIS — N13.8 BPH WITH OBSTRUCTION/LOWER URINARY TRACT SYMPTOMS: ICD-10-CM

## 2021-07-08 LAB
-: ABNORMAL
AMORPHOUS: ABNORMAL
BACTERIA: ABNORMAL
BILIRUBIN URINE: NEGATIVE
CASTS UA: ABNORMAL /LPF
COLOR: YELLOW
COMMENT UA: ABNORMAL
CRYSTALS, UA: ABNORMAL /HPF
EPITHELIAL CELLS UA: ABNORMAL /HPF (ref 0–5)
GLUCOSE URINE: NEGATIVE
KETONES, URINE: NEGATIVE
LEUKOCYTE ESTERASE, URINE: NEGATIVE
MUCUS: ABNORMAL
NITRITE, URINE: NEGATIVE
OTHER OBSERVATIONS UA: ABNORMAL
PH UA: 6.5 (ref 5–9)
PROTEIN UA: NEGATIVE
RBC UA: ABNORMAL /HPF (ref 0–2)
RENAL EPITHELIAL, UA: ABNORMAL /HPF
SPECIFIC GRAVITY UA: 1.01 (ref 1.01–1.02)
TRICHOMONAS: ABNORMAL
TURBIDITY: CLEAR
URINE HGB: NEGATIVE
UROBILINOGEN, URINE: NORMAL
WBC UA: ABNORMAL /HPF (ref 0–5)
YEAST: ABNORMAL

## 2021-07-08 PROCEDURE — 1123F ACP DISCUSS/DSCN MKR DOCD: CPT | Performed by: NURSE PRACTITIONER

## 2021-07-08 PROCEDURE — 99211 OFF/OP EST MAY X REQ PHY/QHP: CPT

## 2021-07-08 PROCEDURE — 3017F COLORECTAL CA SCREEN DOC REV: CPT | Performed by: NURSE PRACTITIONER

## 2021-07-08 PROCEDURE — 1036F TOBACCO NON-USER: CPT | Performed by: NURSE PRACTITIONER

## 2021-07-08 PROCEDURE — G8427 DOCREV CUR MEDS BY ELIG CLIN: HCPCS | Performed by: NURSE PRACTITIONER

## 2021-07-08 PROCEDURE — 4040F PNEUMOC VAC/ADMIN/RCVD: CPT | Performed by: NURSE PRACTITIONER

## 2021-07-08 PROCEDURE — G8417 CALC BMI ABV UP PARAM F/U: HCPCS | Performed by: NURSE PRACTITIONER

## 2021-07-08 PROCEDURE — 81001 URINALYSIS AUTO W/SCOPE: CPT

## 2021-07-08 PROCEDURE — 87086 URINE CULTURE/COLONY COUNT: CPT

## 2021-07-08 PROCEDURE — 99214 OFFICE O/P EST MOD 30 MIN: CPT | Performed by: NURSE PRACTITIONER

## 2021-07-08 RX ORDER — CIPROFLOXACIN 500 MG/1
500 TABLET, FILM COATED ORAL 2 TIMES DAILY
Qty: 6 TABLET | Refills: 0 | Status: SHIPPED | OUTPATIENT
Start: 2021-07-08 | End: 2021-07-11

## 2021-07-08 ASSESSMENT — ENCOUNTER SYMPTOMS
WHEEZING: 0
VOMITING: 0
NAUSEA: 0
EYE REDNESS: 0
COLOR CHANGE: 0
COUGH: 0
BACK PAIN: 0
ABDOMINAL PAIN: 0
SHORTNESS OF BREATH: 0
CONSTIPATION: 0

## 2021-07-08 NOTE — PROGRESS NOTES
HPI:          Patient is a 74 y.o. male in no acute distress. He is alert and oriented to person, place, and time. History  1/2016 Referred for elevated PSA 5.35. 1 week prior to getting the PSA he fell and broke his tailbone. Has no family history of prostate cancer.      PSA  7/2021 - 6.73  5/2021 - 6.33  11/2020 - 4.97  11/2019 - 4.05  8/2018 - 3.83   11/2017 - 3.54   5/2017 - 4.72  3/2017 - 5.70  5/2016 - 3.67  1/2016 - 5.35   9/2013 - 1.71     Today  Here today to follow-up for elevated PSA. He did take 10 days of Levaquin as prescribed. Unfortunately, repeat PSA did increase to 6.73. He denies unintentional weight loss, decreased energy or appetite, new or worsening bone/hip/back pain. He denies any lower extremity numbness and tingling. He denies new or worsening LUTS. He denies gross hematuria or dysuria. Past Medical History:   Diagnosis Date    CAD (coronary artery disease)     s/p multiple stents     History of DVT (deep vein thrombosis) 1990    right calf DVT    Hyperlipidemia     Hypertension     Morbid obesity ([address]      Past Surgical History:   Procedure Laterality Date    CARDIAC CATHETERIZATION Left 04/17/2018    Right radial/[facility]/Dr Wren/95% in stent-restenosis in the proximal LAD with a 95% ostial 95% stenosis in a fairly large D1 branch that appears to be jailed.  Normal LVEDP    COLONOSCOPY  04/26/2019    COLONOSCOPY N/A 4/26/2019    COLONOSCOPY POLYPECTOMY SNARE/COLD BIOPSY performed by Haris Aguirre DO at [address] [place] [address]      4 stents placed- 7 total    KNEE SURGERY      SHOULDER SURGERY       Outpatient Encounter Medications as of 7/8/2021   Medication Sig Dispense Refill    ciprofloxacin (CIPRO) 500 MG tablet Take 1 tablet by mouth 2 times daily for 3 days 6 tablet 0    etodolac (LODINE) 400 MG tablet Take 1 tablet by mouth 2 times daily 30 tablet 0    ticagrelor (BRILINTA) 60 MG TABS tablet Take 1 tablet by mouth 2 times daily 180 tablet 3    carvedilol (COREG) 3.125 MG tablet Take 1 tablet by mouth 2 times daily 180 tablet 3    ezetimibe (ZETIA) 10 MG tablet TAKE ONE TABLET BY MOUTH DAILY 90 tablet 3    lisinopril (PRINIVIL;ZESTRIL) 10 MG tablet Take 10 mg by mouth daily      atorvastatin (LIPITOR) 80 MG tablet Take 80 mg by mouth daily      hydrochlorothiazide (HYDRODIURIL) 25 MG tablet Take 25 mg by mouth daily.  aspirin 81 MG EC tablet Take 81 mg by mouth daily.  nitroGLYCERIN (NITROLINGUAL) 0.4 MG/SPRAY 0.4 mg spray Place 2 sprays under the tongue every 5 minutes as needed for Chest pain (Patient not taking: Reported on 5/17/2021) 1 Bottle 3     Facility-Administered Encounter Medications as of 7/8/2021   Medication Dose Route Frequency Provider Last Rate Last Admin    iopamidol (ISOVUE-370) 76 % injection 90 mL  90 mL Intravenous ONCE PRN Litzy Alexander MD          Current Outpatient Medications on File Prior to Visit   Medication Sig Dispense Refill    etodolac (LODINE) 400 MG tablet Take 1 tablet by mouth 2 times daily 30 tablet 0    ticagrelor (BRILINTA) 60 MG TABS tablet Take 1 tablet by mouth 2 times daily 180 tablet 3    carvedilol (COREG) 3.125 MG tablet Take 1 tablet by mouth 2 times daily 180 tablet 3    ezetimibe (ZETIA) 10 MG tablet TAKE ONE TABLET BY MOUTH DAILY 90 tablet 3    lisinopril (PRINIVIL;ZESTRIL) 10 MG tablet Take 10 mg by mouth daily      atorvastatin (LIPITOR) 80 MG tablet Take 80 mg by mouth daily      hydrochlorothiazide (HYDRODIURIL) 25 MG tablet Take 25 mg by mouth daily.  aspirin 81 MG EC tablet Take 81 mg by mouth daily.       nitroGLYCERIN (NITROLINGUAL) 0.4 MG/SPRAY 0.4 mg spray Place 2 sprays under the tongue every 5 minutes as needed for Chest pain (Patient not taking: Reported on 5/17/2021) 1 Bottle 3     Current Facility-Administered Medications on File Prior to Visit   Medication Dose Route Frequency Provider Last Rate Last Admin    iopamidol (ISOVUE-370) 76 % injection 90 mL  90 mL Intravenous ONCE PRN Yonatan Ceja MD         Patient has no known allergies. Family History   Problem Relation Age of Onset    Kidney Disease Father      Social History     Tobacco Use   Smoking Status Former Smoker    Packs/day: 0.05    Years: 25.00    Pack years: 1.25    Quit date: 1995    Years since quittin.8   Smokeless Tobacco Never Used   Tobacco Comment    quit 20 years ago       Social History     Substance and Sexual Activity   Alcohol Use Yes    Comment: occas       Review of Systems   Constitutional: Negative for appetite change, chills and fever. Eyes: Negative for redness and visual disturbance. Respiratory: Negative for cough, shortness of breath and wheezing. Cardiovascular: Negative for chest pain and leg swelling. Gastrointestinal: Negative for abdominal pain, constipation, nausea and vomiting. Genitourinary: Negative for decreased urine volume, difficulty urinating, discharge, dysuria, enuresis, flank pain, frequency, hematuria, penile pain, scrotal swelling, testicular pain and urgency. Musculoskeletal: Negative for back pain, joint swelling and myalgias. Skin: Negative for color change, rash and wound. Neurological: Negative for dizziness, tremors and numbness. Hematological: Negative for adenopathy. Does not bruise/bleed easily. /74 (Site: Left Upper Arm, Position: Sitting, Cuff Size: Large Adult)   Pulse 61   Temp 97.5 °F (36.4 °C) (Temporal)   Resp 20   Wt 270 lb 6.4 oz (122.7 kg)   BMI 39.93 kg/m²       PHYSICAL EXAM:  Constitutional: Patient in no acute distress; Neuro: alert and oriented to person place and time. Psych: Mood and affect normal.  Skin: Normal  Lungs: Respiratory effort normal  Cardiovascular:  Normal peripheral pulses  Abdomen: Soft, non-tender, non-distended with no CVA, flank pain  Bladder non-tender and not distended.         Lab Results   Component Value Date BUN 16 05/28/2021     Lab Results   Component Value Date    CREATININE 0.70 05/28/2021     Lab Results   Component Value Date    PSA 6.73 (H) 07/05/2021    PSA 6.33 05/10/2021    PSA 4.97 (H) 11/09/2020       ASSESSMENT:   Diagnosis Orders   1. Elevated PSA     2. BPH with obstruction/lower urinary tract symptoms  Culture, Urine    Urinalysis with Microscopic         PLAN:  We discussed that due to his elevated PSA we recommend a prostate biopsy. The patient was told this was typically done with ultrasound guidance and a prostate block with local anesthesia to minimize the discomfort. I discussed the risks including infection, bleeding, hematospermia and rarely sepsis. He was encouraged to give himself a Fleets enema the night prior to the biopsy and to take his antibiotics as instructed. He was told to stop taking ASA and other blood thinners at least a week prior to the biopsy. He was told to go the ER if he experiences fever 100.1F or greater, chills or severe bleeding after the biopsy. Patient amenable to schedule biopsy. We will consult cardiology for directions on holding Brilinta and aspirin.

## 2021-07-09 LAB
CULTURE: NO GROWTH
Lab: NORMAL
SPECIMEN DESCRIPTION: NORMAL

## 2021-07-12 ENCOUNTER — TELEPHONE (OUTPATIENT)
Dept: UROLOGY | Age: 66
End: 2021-07-12

## 2021-07-12 NOTE — TELEPHONE ENCOUNTER
----- [facility] 7[facility]12/2021  9:21 AM EDT -----  Please call pt - urine culture reviewed and does not show UTI

## 2021-07-13 ENCOUNTER — TELEPHONE (OUTPATIENT)
Dept: UROLOGY | Age: 66
End: 2021-07-13

## 2021-07-13 NOTE — TELEPHONE ENCOUNTER
Patient scheduled for prostate biopsy in office on 7/26/21. Fax received from Dr. José Romo stating that patient can hold his Brilinta and Aspirin 7 days prior to prostate biopsy. Called patient and left message for him to call office to inform.

## 2021-07-26 ENCOUNTER — PROCEDURE VISIT (OUTPATIENT)
Dept: UROLOGY | Age: 66
End: 2021-07-26
Payer: MEDICARE

## 2021-07-26 ENCOUNTER — HOSPITAL ENCOUNTER (OUTPATIENT)
Age: 66
Setting detail: SPECIMEN
Discharge: HOME | End: 2021-07-26
Payer: MEDICARE

## 2021-07-26 VITALS
DIASTOLIC BLOOD PRESSURE: 82 MMHG | TEMPERATURE: 97.3 F | BODY MASS INDEX: 39.84 KG/M2 | WEIGHT: 269 LBS | SYSTOLIC BLOOD PRESSURE: 138 MMHG | HEIGHT: 69 IN

## 2021-07-26 DIAGNOSIS — R97.20 ELEVATED PSA: ICD-10-CM

## 2021-07-26 DIAGNOSIS — R97.20 ELEVATED PSA: Primary | ICD-10-CM

## 2021-07-26 PROCEDURE — 55700 PR BIOPSY OF PROSTATE,NEEDLE/PUNCH: CPT | Performed by: UROLOGY

## 2021-07-26 PROCEDURE — 88305 TISSUE EXAM BY PATHOLOGIST: CPT

## 2021-07-26 PROCEDURE — 76872 US TRANSRECTAL: CPT | Performed by: UROLOGY

## 2021-07-26 PROCEDURE — 76942 ECHO GUIDE FOR BIOPSY: CPT | Performed by: UROLOGY

## 2021-07-26 ASSESSMENT — ENCOUNTER SYMPTOMS
SHORTNESS OF BREATH: 0
COLOR CHANGE: 0
BACK PAIN: 0
EYE REDNESS: 0
VOMITING: 0
COUGH: 0
WHEEZING: 0
EYE PAIN: 0
ABDOMINAL PAIN: 0
NAUSEA: 0

## 2021-07-26 NOTE — PROGRESS NOTES
HPI:          Patient is a 74 y.o. male in no acute distress. He is alert and oriented to person, place, and time. History  1/2016 Referred for elevated PSA 5.35. 1 week prior to getting the PSA he fell and broke his tailbone. Has no family history of prostate cancer.      PSA  7/2021 - 6.73  5/2021 - 6.33  11/2020 - 4.97  11/2019 - 4.05  8/2018 - 3.83   11/2017 - 3.54   5/2017 - 4.72  3/2017 - 5.70  5/2016 - 3.67  1/2016 - 5.35   9/2013 - 1.71        Currently  We discussed that due to his elevated PSA we recommend a prostate biopsy. The patient was told this was typically done with ultrasound guidance and a prostate block with local anesthesia to minimize the discomfort. I discussed the risks including infection, bleeding, hematospermia and rarely sepsis. He was encouraged to give himself a Fleets enema the night prior to the biopsy and to take his antibiotics as instructed. He was told to stop taking ASA and other blood thinners at least a week prior to the biopsy. He was told to go the ER if he experiences fever =101 or greater, chills or severe bleeding after the biopsy. Patient amenable to schedule biopsy. He did receive appropriate antibiotic therapy the day prior to biopsy, the day of the biopsy and will continue to take antibiotics after his biopsy. He did undergo 2 enemas prior. Patient was place in the left lateral decubitus position. Biplanar transrectal ultrasound probe was placed in patients rectum. Prostate visualized in both transverse and longitudinal views. Area of Neurovascular bundles visualized and bathed with 1% lidocaine, approximately 5ml per side. Prostate biopsy today shows volume of 83 grams. No abnormal calcifications or hypoechoic areas. 12 needle core biopsies taken, 6 from each side. Lateral and Mid from each portion base, apex, and mid    No adverse events noted. He tolerated the procedure well with no issues.     Past Medical History: Diagnosis Date    CAD (coronary artery disease)     s/p multiple stents     History of DVT (deep vein thrombosis) 1990    right calf DVT    Hyperlipidemia     Hypertension     Morbid obesity ([facility] [address]      Past Surgical History:   Procedure Laterality Date    CARDIAC CATHETERIZATION Left 04/17/2018    [facility]95% in stent-restenosis in the proximal LAD with a 95% ostial 95% stenosis in a fairly large D1 branch that appears to be jailed. Normal LVEDP    COLONOSCOPY  04/26/2019    COLONOSCOPY N/A 4/26/2019    COLONOSCOPY POLYPECTOMY SNARE/COLD BIOPSY performed by Abi Grant DO at [address] [place] [address]      4 stents placed- 7 total    KNEE SURGERY      SHOULDER SURGERY       Outpatient Encounter Medications as of 7/26/2021   Medication Sig Dispense Refill    etodolac (LODINE) 400 MG tablet Take 1 tablet by mouth 2 times daily 30 tablet 0    ticagrelor (BRILINTA) 60 MG TABS tablet Take 1 tablet by mouth 2 times daily 180 tablet 3    carvedilol (COREG) 3.125 MG tablet Take 1 tablet by mouth 2 times daily 180 tablet 3    ezetimibe (ZETIA) 10 MG tablet TAKE ONE TABLET BY MOUTH DAILY 90 tablet 3    nitroGLYCERIN (NITROLINGUAL) 0.4 MG/SPRAY 0.4 mg spray Place 2 sprays under the tongue every 5 minutes as needed for Chest pain (Patient not taking: Reported on 5/17/2021) 1 Bottle 3    lisinopril (PRINIVIL;ZESTRIL) 10 MG tablet Take 10 mg by mouth daily      atorvastatin (LIPITOR) 80 MG tablet Take 80 mg by mouth daily      hydrochlorothiazide (HYDRODIURIL) 25 MG tablet Take 25 mg by mouth daily.  aspirin 81 MG EC tablet Take 81 mg by mouth daily.        Facility-Administered Encounter Medications as of 7/26/2021   Medication Dose Route Frequency Provider Last Rate Last Admin    iopamidol (ISOVUE-370) 76 % injection 90 mL  90 mL Intravenous ONCE PRN Reina Yanez MD          Current Outpatient Medications on File Prior to Visit Medication Sig Dispense Refill    etodolac (LODINE) 400 MG tablet Take 1 tablet by mouth 2 times daily 30 tablet 0    ticagrelor (BRILINTA) 60 MG TABS tablet Take 1 tablet by mouth 2 times daily 180 tablet 3    carvedilol (COREG) 3.125 MG tablet Take 1 tablet by mouth 2 times daily 180 tablet 3    ezetimibe (ZETIA) 10 MG tablet TAKE ONE TABLET BY MOUTH DAILY 90 tablet 3    nitroGLYCERIN (NITROLINGUAL) 0.4 MG/SPRAY 0.4 mg spray Place 2 sprays under the tongue every 5 minutes as needed for Chest pain (Patient not taking: Reported on 2021) 1 Bottle 3    lisinopril (PRINIVIL;ZESTRIL) 10 MG tablet Take 10 mg by mouth daily      atorvastatin (LIPITOR) 80 MG tablet Take 80 mg by mouth daily      hydrochlorothiazide (HYDRODIURIL) 25 MG tablet Take 25 mg by mouth daily.  aspirin 81 MG EC tablet Take 81 mg by mouth daily. Current Facility-Administered Medications on File Prior to Visit   Medication Dose Route Frequency Provider Last Rate Last Admin    iopamidol (ISOVUE-370) 76 % injection 90 mL  90 mL Intravenous ONCE PRN Reina Yanez MD         Patient has no known allergies. Family History   Problem Relation Age of Onset    Kidney Disease Father      Social History     Tobacco Use   Smoking Status Former Smoker    Packs/day: 0.05    Years: 25.00    Pack years: 1.25    Quit date: 1995    Years since quittin.9   Smokeless Tobacco Never Used   Tobacco Comment    quit 20 years ago       Social History     Substance and Sexual Activity   Alcohol Use Yes    Comment: occas       Review of Systems   Constitutional: Negative for appetite change, chills and fever. Eyes: Negative for pain, redness and visual disturbance. Respiratory: Negative for cough, shortness of breath and wheezing. Cardiovascular: Negative for chest pain and leg swelling. Gastrointestinal: Negative for abdominal pain, nausea and vomiting.    Genitourinary: Negative for difficulty urinating, discharge, dysuria, flank pain, frequency, hematuria, scrotal swelling and testicular pain. Musculoskeletal: Negative for back pain, joint swelling and myalgias. Skin: Negative for color change, rash and wound. Neurological: Negative for dizziness, tremors and numbness. Hematological: Negative for adenopathy. Does not bruise/bleed easily. There were no vitals taken for this visit. PHYSICAL EXAM:  Constitutional: Patient in no acute distress; Neuro: alert and oriented to person place and time. Psych: Mood and affect normal.  Skin: Normal  Lungs: Respiratory effort normal  Cardiovascular:  Normal peripheral pulses  Abdomen: Soft, non-tender, non-distended with no CVA, flank pain  Bladder non-tender and not distended. Lymphatics: no palpable lymphadenopathy  Penis normal  Urethral meatus normal  Scrotal exam normal  Testicles normal bilaterally  Epididymis normal bilaterally  No evidence of inguinal hernia      Lab Results   Component Value Date    BUN 16 05/28/2021     Lab Results   Component Value Date    CREATININE 0.70 05/28/2021     Lab Results   Component Value Date    PSA 6.73 (H) 07/05/2021    PSA 6.33 05/10/2021    PSA 4.97 (H) 11/09/2020       ASSESSMENT:  This is a 74 y.o. male with the following diagnoses:   Diagnosis Orders   1. Elevated PSA  MO BIOPSY OF PROSTATE,NEEDLE/PUNCH    MO SONO GUIDE NEEDLE BIOPSY    US TRANSRECTAL       PLAN:  Prostate biopsy today. Patient will follow up in 1 week for pathology review.

## 2021-07-26 NOTE — PATIENT INSTRUCTIONS
SURVEY:    You may be receiving a survey from [employer] regarding your visit today. Please complete the survey to enable us to provide the highest quality of care to you and your family. If you cannot score us a very good on any question, please call the office to discuss how we could of made your experience a very good one. Thank you.

## 2021-07-26 NOTE — PROGRESS NOTES
The following was used during the prostate biopsy without adverse affects:    Novaher Sungh  Lot number [id number]  Expiration date 10/31/2022      BIOPSY GUIDE  Lot number [id number]  Expiration date 08/18/2023      LIDOCAINE 2% INJECTABLE  Lot number [id number]  Expiration date 07/31/2023      Patient felt sick during the procedure, proceeded to get hot and dizzy. Got a wet/cool cloth and water. He started to feel better after the procedure and sitting upright for a while.

## 2021-07-27 LAB
SURGICAL PATHOLOGY REPORT: NORMAL
SURGICAL PATHOLOGY REPORT: NORMAL

## 2021-08-02 ENCOUNTER — OFFICE VISIT (OUTPATIENT)
Dept: UROLOGY | Age: 66
End: 2021-08-02
Payer: MEDICARE

## 2021-08-02 VITALS
DIASTOLIC BLOOD PRESSURE: 80 MMHG | HEART RATE: 88 BPM | TEMPERATURE: 96.8 F | SYSTOLIC BLOOD PRESSURE: 129 MMHG | WEIGHT: 271 LBS | HEIGHT: 69 IN | BODY MASS INDEX: 40.14 KG/M2

## 2021-08-02 DIAGNOSIS — N13.8 BPH WITH OBSTRUCTION/LOWER URINARY TRACT SYMPTOMS: ICD-10-CM

## 2021-08-02 DIAGNOSIS — N40.1 BPH WITH OBSTRUCTION/LOWER URINARY TRACT SYMPTOMS: ICD-10-CM

## 2021-08-02 DIAGNOSIS — R97.20 ELEVATED PSA: Primary | ICD-10-CM

## 2021-08-02 PROCEDURE — 1123F ACP DISCUSS/DSCN MKR DOCD: CPT | Performed by: PHYSICIAN ASSISTANT

## 2021-08-02 PROCEDURE — 99211 OFF/OP EST MAY X REQ PHY/QHP: CPT

## 2021-08-02 PROCEDURE — 1036F TOBACCO NON-USER: CPT | Performed by: PHYSICIAN ASSISTANT

## 2021-08-02 PROCEDURE — G8417 CALC BMI ABV UP PARAM F/U: HCPCS | Performed by: PHYSICIAN ASSISTANT

## 2021-08-02 PROCEDURE — 3017F COLORECTAL CA SCREEN DOC REV: CPT | Performed by: PHYSICIAN ASSISTANT

## 2021-08-02 PROCEDURE — G8427 DOCREV CUR MEDS BY ELIG CLIN: HCPCS | Performed by: PHYSICIAN ASSISTANT

## 2021-08-02 PROCEDURE — 4040F PNEUMOC VAC/ADMIN/RCVD: CPT | Performed by: PHYSICIAN ASSISTANT

## 2021-08-02 PROCEDURE — 99213 OFFICE O/P EST LOW 20 MIN: CPT | Performed by: PHYSICIAN ASSISTANT

## 2021-08-02 ASSESSMENT — ENCOUNTER SYMPTOMS
CONSTIPATION: 0
BACK PAIN: 0
VOMITING: 0
WHEEZING: 0
EYE REDNESS: 0
COUGH: 0
NAUSEA: 0
SHORTNESS OF BREATH: 0
ABDOMINAL PAIN: 0
COLOR CHANGE: 0

## 2021-08-02 NOTE — PROGRESS NOTES
HPI:      Patient is a 74 y.o. male in no acute distress. He is alert and oriented to person, place, and time. History  1/2016 Referred for elevated PSA 5.35. 1 week prior to getting the PSA he fell and broke his tailbone. Has no family history of prostate cancer.      7/2021 -prostate biopsy  all 12 cores negative for malignancy    PSA  7/2021 - 6.73  5/2021 - 6.33  11/2020 - 4.97  11/2019 - 4.05  8/2018 - 3.83   11/2017 - 3.54   5/2017 - 4.72  3/2017 - 5.70  5/2016 - 3.67  1/2016 - 5.35   9/2013 - 1.71      Today:  Patient returns today for follow-up pathology results from prostate biopsy. Patient states he is doing well. He denies any fever or chills. He did have some blood in his urine but this is resolved. He overall is doing well. Patient's prostate biopsy pathology showed all 12 cores negative for malignancy. Past Medical History:   Diagnosis Date    CAD (coronary artery disease)     s/p multiple stents     History of DVT (deep vein thrombosis) 1990    right calf DVT    Hyperlipidemia     Hypertension     Morbid obesity ([facility] [address]      Past Surgical History:   Procedure Laterality Date    CARDIAC CATHETERIZATION Left 04/17/2018    Right radial/[facility] [place]/Dr Wren/95% in stent-restenosis in the proximal LAD with a 95% ostial 95% stenosis in a fairly large D1 branch that appears to be jailed.  Normal LVEDP    COLONOSCOPY  04/26/2019    COLONOSCOPY N/A 4/26/2019    COLONOSCOPY POLYPECTOMY SNARE/COLD BIOPSY performed by Ethan Quesada DO at [address] [place] [address]      4 stents placed- 7 total    KNEE SURGERY      PROSTATE SURGERY  07/2021    Prostate biopsy    SHOULDER SURGERY       Outpatient Encounter Medications as of 8/2/2021   Medication Sig Dispense Refill    etodolac (LODINE) 400 MG tablet Take 1 tablet by mouth 2 times daily 30 tablet 0    ticagrelor (BRILINTA) 60 MG TABS tablet Take 1 tablet by mouth 2 times daily 180 tablet 3    carvedilol (COREG) 3.125 MG tablet Take 1 tablet by mouth 2 times daily 180 tablet 3    ezetimibe (ZETIA) 10 MG tablet TAKE ONE TABLET BY MOUTH DAILY 90 tablet 3    lisinopril (PRINIVIL;ZESTRIL) 10 MG tablet Take 10 mg by mouth daily      atorvastatin (LIPITOR) 80 MG tablet Take 80 mg by mouth daily      hydrochlorothiazide (HYDRODIURIL) 25 MG tablet Take 25 mg by mouth daily.  aspirin 81 MG EC tablet Take 81 mg by mouth daily.  nitroGLYCERIN (NITROLINGUAL) 0.4 MG/SPRAY 0.4 mg spray Place 2 sprays under the tongue every 5 minutes as needed for Chest pain (Patient not taking: Reported on 8/2/2021) 1 Bottle 3     Facility-Administered Encounter Medications as of 8/2/2021   Medication Dose Route Frequency Provider Last Rate Last Admin    iopamidol (ISOVUE-370) 76 % injection 90 mL  90 mL Intravenous ONCE PRN Annette Pop MD          Current Outpatient Medications on File Prior to Visit   Medication Sig Dispense Refill    etodolac (LODINE) 400 MG tablet Take 1 tablet by mouth 2 times daily 30 tablet 0    ticagrelor (BRILINTA) 60 MG TABS tablet Take 1 tablet by mouth 2 times daily 180 tablet 3    carvedilol (COREG) 3.125 MG tablet Take 1 tablet by mouth 2 times daily 180 tablet 3    ezetimibe (ZETIA) 10 MG tablet TAKE ONE TABLET BY MOUTH DAILY 90 tablet 3    lisinopril (PRINIVIL;ZESTRIL) 10 MG tablet Take 10 mg by mouth daily      atorvastatin (LIPITOR) 80 MG tablet Take 80 mg by mouth daily      hydrochlorothiazide (HYDRODIURIL) 25 MG tablet Take 25 mg by mouth daily.  aspirin 81 MG EC tablet Take 81 mg by mouth daily.       nitroGLYCERIN (NITROLINGUAL) 0.4 MG/SPRAY 0.4 mg spray Place 2 sprays under the tongue every 5 minutes as needed for Chest pain (Patient not taking: Reported on 8/2/2021) 1 Bottle 3     Current Facility-Administered Medications on File Prior to Visit   Medication Dose Route Frequency Provider Last Rate Last Admin    iopamidol (ISOVUE-370) 76 % injection 90 mL  90 mL Intravenous ONCE PRN Zeeshan Vidal MD         Patient has no known allergies. Family History   Problem Relation Age of Onset    Kidney Disease Father      Social History     Tobacco Use   Smoking Status Former Smoker    Packs/day: 0.05    Years: 25.00    Pack years: 1.25    Quit date: 1995    Years since quittin.9   Smokeless Tobacco Never Used   Tobacco Comment    quit 20 years ago       Social History     Substance and Sexual Activity   Alcohol Use Yes    Comment: occas       Review of Systems   Constitutional: Negative for appetite change, chills and fever. Eyes: Negative for redness and visual disturbance. Respiratory: Negative for cough, shortness of breath and wheezing. Cardiovascular: Negative for chest pain and leg swelling. Gastrointestinal: Negative for abdominal pain, constipation, nausea and vomiting. Genitourinary: Negative for decreased urine volume, difficulty urinating, discharge, dysuria, enuresis, flank pain, frequency, hematuria, penile pain, scrotal swelling, testicular pain and urgency. Musculoskeletal: Negative for back pain, joint swelling and myalgias. Skin: Negative for color change, rash and wound. Neurological: Negative for dizziness, tremors and numbness. Hematological: Negative for adenopathy. Does not bruise/bleed easily. /80 (Site: Right Upper Arm, Position: Sitting, Cuff Size: Large Adult)   Pulse 88   Temp 96.8 °F (36 °C)   Ht 5' 9\" (1.753 m)   Wt 271 lb (122.9 kg)   BMI 40.02 kg/m²       PHYSICAL EXAM:  Constitutional: Patient in no acute distress; Neuro: alert and oriented to person place and time.     Psych: Mood and affect normal.  Lungs: Respiratory effort normal  Abdomen: Soft, non-tender, non-distended  Rectal: Deferred      Lab Results   Component Value Date    BUN 16 2021     Lab Results   Component Value Date    CREATININE 0.70 2021     Lab Results   Component Value Date    PSA 6.73 (H) 2021    PSA 6.33 05/10/2021    PSA 4.97 (H) 11/09/2020       ASSESSMENT:   Diagnosis Orders   1. Elevated PSA  PSA, Diagnostic   2. BPH with obstruction/lower urinary tract symptoms           PLAN:  Follow-up in 6 months with repeat PSA, patient states that he would prefer not coming in for an appointment if he can. We did agree that if his PSA remains stable we'll call him with the results. If his results are not stable and trending upward he will need to keep an appointment.

## 2021-08-02 NOTE — PATIENT INSTRUCTIONS
SURVEY:    You may be receiving a survey from [employer] regarding your visit today. Please complete the survey to enable us to provide the highest quality of care to you and your family. If you cannot score us a very good on any question, please call the office to discuss how we could have made your experience a very good one. Thank you.   Nasrin

## 2021-08-17 ENCOUNTER — HOSPITAL ENCOUNTER (EMERGENCY)
Age: 66
Discharge: HOME | End: 2021-08-17
Payer: MEDICARE

## 2021-08-17 ENCOUNTER — APPOINTMENT (OUTPATIENT)
Dept: GENERAL RADIOLOGY | Age: 66
End: 2021-08-17
Payer: MEDICARE

## 2021-08-17 VITALS
OXYGEN SATURATION: 96 % | SYSTOLIC BLOOD PRESSURE: 103 MMHG | TEMPERATURE: 97.3 F | RESPIRATION RATE: 16 BRPM | HEART RATE: 75 BPM | DIASTOLIC BLOOD PRESSURE: 65 MMHG

## 2021-08-17 DIAGNOSIS — S52.511A CLOSED DISPLACED FRACTURE OF STYLOID PROCESS OF RIGHT RADIUS, INITIAL ENCOUNTER: Primary | ICD-10-CM

## 2021-08-17 PROCEDURE — 73110 X-RAY EXAM OF WRIST: CPT

## 2021-08-17 PROCEDURE — 29125 APPL SHORT ARM SPLINT STATIC: CPT

## 2021-08-17 PROCEDURE — 99283 EMERGENCY DEPT VISIT LOW MDM: CPT

## 2021-08-17 PROCEDURE — 99284 EMERGENCY DEPT VISIT MOD MDM: CPT

## 2021-08-17 ASSESSMENT — ENCOUNTER SYMPTOMS
EYE REDNESS: 0
CHEST TIGHTNESS: 0
WHEEZING: 0
RHINORRHEA: 0
COUGH: 0
EYE DISCHARGE: 0
DIARRHEA: 0
CONSTIPATION: 0
NAUSEA: 0
VOMITING: 0
SHORTNESS OF BREATH: 0
ABDOMINAL PAIN: 0
SORE THROAT: 0
BACK PAIN: 0
BLOOD IN STOOL: 0

## 2021-08-17 ASSESSMENT — PAIN DESCRIPTION - ORIENTATION: ORIENTATION: RIGHT

## 2021-08-17 ASSESSMENT — PAIN DESCRIPTION - PAIN TYPE: TYPE: ACUTE PAIN

## 2021-08-17 ASSESSMENT — PAIN DESCRIPTION - DESCRIPTORS: DESCRIPTORS: ACHING

## 2021-08-17 ASSESSMENT — PAIN DESCRIPTION - LOCATION: LOCATION: WRIST

## 2021-08-17 ASSESSMENT — PAIN DESCRIPTION - FREQUENCY: FREQUENCY: CONTINUOUS

## 2021-08-17 ASSESSMENT — PAIN SCALES - GENERAL: PAINLEVEL_OUTOF10: 5

## 2021-08-17 NOTE — ED PROVIDER NOTES
[facility]  EMERGENCY DEPARTMENT ENCOUNTER      Pt Name: Jackline Gitelman  MRN: [id number]  [place] 1955  Date of evaluation: 8/17/2021  Provider: Jimmy Kelly Dr     Chief Complaint   Patient presents with    Wrist Pain     right; fell Saturday d/t tripping; denies LOC         HISTORY OF PRESENT ILLNESS   (Location/Symptom, Timing/Onset, Context/Setting,Quality, Duration, Modifying Factors, Severity)  Note limiting factors. Jackline Gitelman is a75 y.o. male who presents to the emergency department with complaints of right wrist injury onset 3 days ago. Patient reports that he tripped and fell landing on his right wrist.  He he denies any other injury associated with this event. Denies hand pain or elbow pain. Denies any loss of consciousness. Denies neck and back pain. Denies any numbness or tingling sensation. He has no other complaints at this time. Rates his pain a 5 out of 10. Reports he is right-handed. HPI    Nursing Notes werereviewed. REVIEW OF SYSTEMS    (2-9 systems for level 4, 10 or more for level 5)     Review of Systems   Constitutional: Negative for chills, diaphoresis and fever. HENT: Negative for congestion, ear pain, rhinorrhea and sore throat. Eyes: Negative for discharge, redness and visual disturbance. Respiratory: Negative for cough, chest tightness, shortness of breath and wheezing. Cardiovascular: Negative for chest pain and palpitations. Gastrointestinal: Negative for abdominal pain, blood in stool, constipation, diarrhea, nausea and vomiting. Endocrine: Negative for polydipsia, polyphagia and polyuria. Genitourinary: Negative for decreased urine volume, difficulty urinating, dysuria, frequency and hematuria. Musculoskeletal: Positive for arthralgias. Negative for back pain and myalgias. Skin: Negative for pallor and rash. Allergic/Immunologic: Negative for food allergies and immunocompromised state. Neurological: Negative for dizziness, syncope, weakness and light-headedness. Hematological: Negative for adenopathy. Does not bruise/bleed easily. Psychiatric/Behavioral: Negative for behavioral problems and suicidal ideas. The patient is not nervous/anxious. Except as noted above the remainder of the review of systems was reviewed and negative. PAST MEDICAL HISTORY     Past Medical History:   Diagnosis Date    CAD (coronary artery disease)     s/p multiple stents     History of DVT (deep vein thrombosis) 1990    right calf DVT    Hyperlipidemia     Hypertension     Morbid obesity ([address]          SURGICALHISTORY       Past Surgical History:   Procedure Laterality Date    CARDIAC CATHETERIZATION Left 04/17/2018    Right radial/[facility]/Dr Wren/95% in stent-restenosis in the proximal LAD with a 95% ostial 95% stenosis in a fairly large D1 branch that appears to be jailed. Normal LVEDP    COLONOSCOPY  04/26/2019    COLONOSCOPY N/A 4/26/2019    COLONOSCOPY POLYPECTOMY SNARE/COLD BIOPSY performed by Vanesa Keenan DO at [address] [place] [address]      4 stents placed- 7 total    KNEE SURGERY      PROSTATE SURGERY  07/2021    Prostate biopsy    SHOULDER SURGERY           CURRENT MEDICATIONS       Previous Medications    ASPIRIN 81 MG EC TABLET    Take 81 mg by mouth daily. ATORVASTATIN (LIPITOR) 80 MG TABLET    Take 80 mg by mouth daily    CARVEDILOL (COREG) 3.125 MG TABLET    Take 1 tablet by mouth 2 times daily    ETODOLAC (LODINE) 400 MG TABLET    Take 1 tablet by mouth 2 times daily    EZETIMIBE (ZETIA) 10 MG TABLET    TAKE ONE TABLET BY MOUTH DAILY    HYDROCHLOROTHIAZIDE (HYDRODIURIL) 25 MG TABLET    Take 25 mg by mouth daily.     LISINOPRIL (PRINIVIL;ZESTRIL) 10 MG TABLET    Take 10 mg by mouth daily    NITROGLYCERIN (NITROLINGUAL) 0.4 MG/SPRAY 0.4 MG SPRAY    Place 2 sprays under the tongue every 5 minutes as needed for Chest pain TICAGRELOR (BRILINTA) 60 MG TABS TABLET    Take 1 tablet by mouth 2 times daily         ALLERGIES   Patient has no known allergies. FAMILY HISTORY       Family History   Problem Relation Age of Onset    Kidney Disease Father           SOCIAL HISTORY       Social History     Socioeconomic History    Marital status:      Spouse name: None    Number of children: None    Years of education: None    Highest education level: None   Occupational History    None   Tobacco Use    Smoking status: Former Smoker     Packs/day: 0.05     Years: 25.00     Pack years: 1.25     Quit date: 1995     Years since quittin.0    Smokeless tobacco: Never Used    Tobacco comment: quit 20 years ago   Vaping Use    Vaping Use: Never used   Substance and Sexual Activity    Alcohol use: Yes     Comment: occas    Drug use: No    Sexual activity: None   Other Topics Concern    None   Social History Narrative    None     Social Determinants of Health     Financial Resource Strain:     Difficulty of Paying Living Expenses:    Food Insecurity:     Worried About Running Out of Food in the Last Year:     Ran Out of Food in the Last Year:    Transportation Needs:     Lack of Transportation (Medical):      Lack of Transportation (Non-Medical):    Physical Activity:     Days of Exercise per Week:     Minutes of Exercise per Session:    Stress:     Feeling of Stress :    Social Connections:     Frequency of Communication with Friends and Family:     Frequency of Social Gatherings with Friends and Family:     Attends Jain Services:     Active Member of Clubs or Organizations:     Attends Club or Organization Meetings:     Marital Status:    Intimate Partner Violence:     Fear of Current or Ex-Partner:     Emotionally Abused:     Physically Abused:     Sexually Abused:        SCREENINGS             PHYSICAL EXAM    (up to 7 for level 4, 8 or more for level 5)     ED Triage Vitals [21 1155]   BP Temp Temp Source Pulse Resp SpO2 Height Weight   103/65 97.3 °F (36.3 °C) Tympanic 75 16 96 % -- --       Physical Exam  Vitals and nursing note reviewed. Constitutional:       General: He is not in acute distress. Appearance: Normal appearance. He is well-developed. He is not ill-appearing, toxic-appearing or diaphoretic. HENT:      Head: Normocephalic. Comments: Small abrasion over nasal bridge right forehead     Right Ear: External ear normal.      Left Ear: External ear normal.      Mouth/Throat:      Mouth: Mucous membranes are moist.      Pharynx: No oropharyngeal exudate. Eyes:      General: No scleral icterus. Right eye: No discharge. Left eye: No discharge. Extraocular Movements: Extraocular movements intact. Conjunctiva/sclera: Conjunctivae normal.      Pupils: Pupils are equal, round, and reactive to light. Neck:      Trachea: No tracheal deviation. Cardiovascular:      Rate and Rhythm: Normal rate and regular rhythm. Pulmonary:      Effort: Pulmonary effort is normal. No respiratory distress. Breath sounds: No stridor. Musculoskeletal:         General: No tenderness or deformity. Normal range of motion. Cervical back: Full passive range of motion without pain, normal range of motion and neck supple. No spinous process tenderness or muscular tenderness. Normal range of motion. Comments: No midline bony spinal tenderness or palpable step-off deformity. Patient has tenderness and swelling of the right wrist.  No point anatomical snuffbox tenderness Intact distal pulses sensation cap refills less than 3 seconds no open wounds. Able to flex and extend at the wrist and elbow. Able to pronate and supinate. Able make a thumbs up and okay sign. Skin:     General: Skin is warm and dry. Capillary Refill: Capillary refill takes less than 2 seconds. Findings: No erythema or rash. Neurological:      General: No focal deficit present. Mental Status: He is alert and oriented to person, place, and time. Cranial Nerves: No cranial nerve deficit. Motor: No abnormal muscle tone. Deep Tendon Reflexes: Reflexes are normal and symmetric. Psychiatric:         Behavior: Behavior normal.         DIAGNOSTIC RESULTS     EKG: All EKG's are interpreted by the Emergency Department Physician who either signs orCo-signs this chart in the absence of a cardiologist.      RADIOLOGY:   Non-plainfilm images such as CT, Ultrasound and MRI are read by the radiologist. Plain radiographic images are visualized and preliminarily interpreted by the emergency physician with the below findings:      Interpretationper the Radiologist below, if available at the time of this note:    XR WRIST RIGHT (MIN 3 VIEWS)   Final Result   Minimally displaced radial styloid process fracture. ED BEDSIDE ULTRASOUND:   Performed by ED Physician - none    LABS:  Labs Reviewed - No data to display    All other labs were within normal range or not returned as of this dictation. EMERGENCY DEPARTMENT COURSE and DIFFERENTIAL DIAGNOSIS/MDM:   Vitals:    Vitals:    08/17/21 1155   BP: 103/65   Pulse: 75   Resp: 16   Temp: 97.3 °F (36.3 °C)   TempSrc: Tympanic   SpO2: 96%         MDM  Gricelda Araiza is a 74 y.o. male who presents to the emergency department s/p fall; we will order x-rays to rule out acute fracture, subluxation or other bony abnormality. Patient has an obvious abrasion to the face. Reports no loss of consciousness. He is adamant to myself as well as the nurse he is only here for his wrist and no other problems. As I am examining his spine he reminds me he is only here for his wrist.  He is declining any further imaging and understands the risk. He is on Brilinta and aspirin he understands that an untreated head bleed is serious life-threatening and can cause death. Patient is resting comfortably at this time and in no distress.   I have updated patient on current results. We discussed at length the patient's diagnosis. Patient understands to follow up with primary care provider in the next 2 days. Patient verbalized understanding of this. We went over medications. Strict return warnings were given. Patient will return to the emergency room as needed with new or worsening complaints. Patient has been given information for orthopedic follow-up. They will call tomorrow to arrange follow-up within the next 48 hours. I have contacted orthopedic surgeon, Dr. Rosa Batista who will see patient in follow-up. Procedures    FINAL IMPRESSION      1. Closed displaced fracture of styloid process of right radius, initial encounter        DISPOSITION/PLAN   DISPOSITION        PATIENT REFERRED TO:  [facility]  [address] [facility]  [phone]    If symptoms worsen, As needed    Kyle Patel MD  [address] [place] [address]  [phone]    Schedule an appointment as soon as possible for a visit in 1 day  Call to arrange follow up with orthopedic physician      DISCHARGE MEDICATIONS:  New Prescriptions    No medications on file              Summation      Patient Course:      ED Medications administered this visit:  Medications - No data to display    New Prescriptions from this visit:    New Prescriptions    No medications on file       Follow-up:  [facility]  [address]  [phone]    If symptoms worsen, As needed    Kyle Patel MD  [address],[facility] Floor  [phone]    Schedule an appointment as soon as possible for a visit in 1 day  Call to arrange follow up with orthopedic physician        Final Impression:   1.  Closed displaced fracture of styloid process of right radius, initial encounter               (Please note that portions of this note were completed with a voice recognition program.  Efforts were made to edit the dictations but occasionally words are mis-transcribed.)           Rush Martinez PA-C  08/17/21 7543

## 2021-09-12 ENCOUNTER — HOSPITAL ENCOUNTER (EMERGENCY)
Age: 66
Discharge: HOME | End: 2021-09-12
Attending: FAMILY MEDICINE
Payer: MEDICARE

## 2021-09-12 VITALS
OXYGEN SATURATION: 96 % | BODY MASS INDEX: 39.99 KG/M2 | TEMPERATURE: 98.2 F | WEIGHT: 270 LBS | HEART RATE: 69 BPM | DIASTOLIC BLOOD PRESSURE: 69 MMHG | HEIGHT: 69 IN | SYSTOLIC BLOOD PRESSURE: 121 MMHG | RESPIRATION RATE: 18 BRPM

## 2021-09-12 DIAGNOSIS — S81.812A LACERATION OF LEFT LOWER EXTREMITY, INITIAL ENCOUNTER: Primary | ICD-10-CM

## 2021-09-12 PROCEDURE — 90715 TDAP VACCINE 7 YRS/> IM: CPT | Performed by: FAMILY MEDICINE

## 2021-09-12 PROCEDURE — 99283 EMERGENCY DEPT VISIT LOW MDM: CPT

## 2021-09-12 PROCEDURE — 6360000002 HC RX W HCPCS: Performed by: FAMILY MEDICINE

## 2021-09-12 PROCEDURE — 96372 THER/PROPH/DIAG INJ SC/IM: CPT

## 2021-09-12 PROCEDURE — 90471 IMMUNIZATION ADMIN: CPT | Performed by: FAMILY MEDICINE

## 2021-09-12 RX ORDER — CEPHALEXIN 500 MG/1
500 CAPSULE ORAL 4 TIMES DAILY
Qty: 28 CAPSULE | Refills: 0 | Status: SHIPPED | OUTPATIENT
Start: 2021-09-12 | End: 2021-09-19

## 2021-09-12 RX ORDER — CEFAZOLIN SODIUM 1 G/3ML
1000 INJECTION, POWDER, FOR SOLUTION INTRAMUSCULAR; INTRAVENOUS ONCE
Status: COMPLETED | OUTPATIENT
Start: 2021-09-12 | End: 2021-09-12

## 2021-09-12 RX ADMIN — TETANUS TOXOID, REDUCED DIPHTHERIA TOXOID AND ACELLULAR PERTUSSIS VACCINE, ADSORBED 0.5 ML: 5; 2.5; 8; 8; 2.5 SUSPENSION INTRAMUSCULAR at 11:27

## 2021-09-12 RX ADMIN — CEFAZOLIN 1000 MG: 330 INJECTION, POWDER, FOR SOLUTION INTRAMUSCULAR; INTRAVENOUS at 11:27

## 2021-09-12 ASSESSMENT — ENCOUNTER SYMPTOMS
EYES NEGATIVE: 1
GASTROINTESTINAL NEGATIVE: 1
RESPIRATORY NEGATIVE: 1

## 2021-09-12 NOTE — ED PROVIDER NOTES
[address] [facility]  EMERGENCY DEPARTMENT ENCOUNTER      Pt Name: Claude Castro  MRN: [id number]  [place] 1955  Date of evaluation: 9/12/2021  Provider: MD Dami Stone     Chief Complaint   Patient presents with    Laceration     Left lower leg, lateral. ONset 2 days ago after hitting on a magazine rack         HISTORY OF PRESENT ILLNESS   (Location/Symptom, Timing/Onset, Context/Setting,Quality, Duration, Modifying Factors, Severity)  Note limiting factors. Claude Castro is a75 y.o. male who presents to the emergency department      Patient is a pleasant 74years old male presented to ER complaining of laceration to the left lower leg. Friday about 4 PM he was walking the house and he hit magazine stand inadvertently with his left lower leg and sustained a laceration. He did not want to come to the ER at the time. Since then he has been experiencing some bleeding and some pain and decided come to the ER today. Nursing Notes werereviewed. REVIEW OF SYSTEMS    (2-9 systems for level 4, 10 or more for level 5)     Review of Systems   Constitutional: Negative. HENT: Negative. Eyes: Negative. Respiratory: Negative. Cardiovascular: Negative. Gastrointestinal: Negative. Endocrine: Negative. Genitourinary: Negative. Musculoskeletal: Negative. Skin:        Laceration to the left lower leg with some tenderness and slight swelling. Neurological: Negative. Except as noted above the remainder of the review of systems was reviewed and negative.        PAST MEDICAL HISTORY     Past Medical History:   Diagnosis Date    CAD (coronary artery disease)     s/p multiple stents     History of DVT (deep vein thrombosis) 1990    right calf DVT    Hyperlipidemia     Hypertension     Morbid obesity ([address]          SURGICALHISTORY       Past Surgical History:   Procedure Laterality Date    CARDIAC CATHETERIZATION Left 04/17/2018    [facility]Dr Wren/95% in stent-restenosis in the proximal LAD with a 95% ostial 95% stenosis in a fairly large D1 branch that appears to be jailed. Normal LVEDP    COLONOSCOPY  2019    COLONOSCOPY N/A 2019    COLONOSCOPY POLYPECTOMY SNARE/COLD BIOPSY performed by Estela Dudley DO at [address] [facility]      4 stents placed- 7 total    KNEE SURGERY      PROSTATE SURGERY  2021    Prostate biopsy    SHOULDER SURGERY           CURRENT MEDICATIONS       Previous Medications    ASPIRIN 81 MG EC TABLET    Take 81 mg by mouth daily. ATORVASTATIN (LIPITOR) 80 MG TABLET    Take 80 mg by mouth daily    CARVEDILOL (COREG) 3.125 MG TABLET    Take 1 tablet by mouth 2 times daily    ETODOLAC (LODINE) 400 MG TABLET    Take 1 tablet by mouth 2 times daily    EZETIMIBE (ZETIA) 10 MG TABLET    TAKE ONE TABLET BY MOUTH DAILY    HYDROCHLOROTHIAZIDE (HYDRODIURIL) 25 MG TABLET    Take 25 mg by mouth daily. LISINOPRIL (PRINIVIL;ZESTRIL) 10 MG TABLET    Take 10 mg by mouth daily    NITROGLYCERIN (NITROLINGUAL) 0.4 MG/SPRAY 0.4 MG SPRAY    Place 2 sprays under the tongue every 5 minutes as needed for Chest pain    TICAGRELOR (BRILINTA) 60 MG TABS TABLET    Take 1 tablet by mouth 2 times daily            Patient has no known allergies.     FAMILY HISTORY       Family History   Problem Relation Age of Onset    Kidney Disease Father           SOCIAL HISTORY       Social History     Socioeconomic History    Marital status:      Spouse name: None    Number of children: None    Years of education: None    Highest education level: None   Occupational History    None   Tobacco Use    Smoking status: Former Smoker     Packs/day: 0.05     Years: 25.00     Pack years: 1.25     Quit date: 1995     Years since quittin.0    Smokeless tobacco: Never Used    Tobacco comment: quit 20 years ago   Vaping Use    Vaping Use: Never used   Substance and Sexual Activity    Alcohol use: Yes     Comment: occas    Drug use: No    Sexual activity: None   Other Topics Concern    None   Social History Narrative    None     Social Determinants of Health     Financial Resource Strain:     Difficulty of Paying Living Expenses:    Food Insecurity:     Worried About Running Out of Food in the Last Year:     [address] in the Last Year:    Transportation Needs:     Lack of Transportation (Medical):  Lack of Transportation (Non-Medical):    Physical Activity:     Days of Exercise per Week:     Minutes of Exercise per Session:    Stress:     Feeling of Stress :    Social Connections:     Frequency of Communication with Friends and Family:     Frequency of Social Gatherings with Friends and Family:     Attends Restorationism Services:     Active Member of Clubs or Organizations:     Attends Club or Organization Meetings:     Marital Status:    Intimate Partner Violence:     Fear of Current or Ex-Partner:     Emotionally Abused:     Physically Abused:     Sexually Abused:        SCREENINGS      [place] Coma Scale  Eye Opening: Spontaneous  Best Verbal Response: Oriented  Best Motor Response: Obeys commands  [place] Coma Scale Score: 15             PHYSICAL EXAM    (up to 7 for level 4, 8 or more for level 5)     ED Triage Vitals   BP Temp Temp Source Pulse Resp SpO2 Height Weight   09/12/21 0852 09/12/21 0849 09/12/21 0849 09/12/21 0849 09/12/21 0849 09/12/21 0849 09/12/21 0849 09/12/21 0849   121/69 98.2 °F (36.8 °C) Tympanic 69 18 96 % 5' 9\" (1.753 m) 270 lb (122.5 kg)       Physical Exam  Vitals and nursing note reviewed. Constitutional:       General: He is not in acute distress. Appearance: He is obese. He is not ill-appearing, toxic-appearing or diaphoretic. HENT:      Head: Normocephalic and atraumatic. Mouth/Throat:      Mouth: Mucous membranes are moist.   Cardiovascular:      Rate and Rhythm: Normal rate and regular rhythm. Pulses: Normal pulses. Heart sounds: Normal heart sounds. Musculoskeletal:         General: No swelling, tenderness, deformity or signs of injury. Normal range of motion. Right lower leg: No edema. Left lower leg: No edema. Skin:     Capillary Refill: Capillary refill takes less than 2 seconds. Comments: L-shaped laceration to the left lower extremity roughly about 4 inches total length. There is no erythema or exudate noted. Edges are fairly smooth and clean. Some tenderness to palpation and patient is guarding the area. No obvious foreign bodies noted. Neurological:      General: No focal deficit present. Mental Status: He is alert and oriented to person, place, and time. DIAGNOSTIC RESULTS     EKG: All EKG's are interpreted by the Emergency Department Physician who either signs orCo-signs this chart in the absence of a cardiologist.        RADIOLOGY:   plain film images such as CT, Ultrasound and MRI are read by the radiologist. Plain radiographic images are visualized and preliminarily interpreted by the emergency physician with the below findings:        Interpretation per the Radiologist below, ifavailable at the time of this note:    No orders to display         ED BEDSIDE ULTRASOUND:   Performed by ED Physician - none    LABS:  Labs Reviewed - No data to display    All other labs were within normal range ornot returned as of this dictation. EMERGENCY DEPARTMENT COURSE and DIFFERENTIAL DIAGNOSIS/MDM:   Vitals:    Vitals:    09/12/21 0849 09/12/21 0852   BP:  121/69   Pulse: 69    Resp: 18    Temp: 98.2 °F (36.8 °C)    TempSrc: Tympanic    SpO2: 96%    Weight: 270 lb (122.5 kg)    Height: 5' 9\" (1.753 m)            MDM  Number of Diagnoses or Management Options  Diagnosis management comments: Patient sustained laceration on Friday which is more than 24 hours ago presented they asking for his wound to be sutured up.   Explaining that we cannot do that since be more than 24 hours the risk of infection is extremely high. We did give him a shot of Ancef and updated his tetanus shot/booster and told him that we will place him antibiotics and left 24 hours of p.o. antibiotics and can return to the ER to have an attempt to have the laceration repaired/stitched up. CRITICAL CARE TIME   Total CriticalCare time was  minutes, excluding separately reportable procedures. There was a high probability of clinically significant/life threatening deterioration in the patient's condition which required my urgent intervention. CONSULTS:  None    PROCEDURES:  Unlessotherwise noted below, none     Procedures    FINAL IMPRESSION      1.  Laceration of left lower extremity, initial encounter          DISPOSITION/PLAN   DISPOSITION        PATIENT REFERRED TO:  Bethena Leyden, MD  [facility] [address] [phone]            DISCHARGE MEDICATIONS:  New Prescriptions    CEPHALEXIN (KEFLEX) 500 MG CAPSULE    Take 1 capsule by mouth 4 times daily for 7 days              (Please note that portions of this note were completed with a voice recognition program.  Efforts were made to edit the dictations but occasionally words are mis-transcribed.)      Skye Flores MD (electronically signed)  Attending Emergency Physician            Skye Flores MD  09/15/21 7348

## 2022-02-01 LAB — PROSTATE SPECIFIC ANTIGEN: 5.39 NG/ML

## 2022-02-02 ENCOUNTER — TELEPHONE (OUTPATIENT)
Dept: UROLOGY | Age: 67
End: 2022-02-02

## 2022-02-02 DIAGNOSIS — R97.20 ELEVATED PSA: ICD-10-CM

## 2022-02-02 DIAGNOSIS — R97.20 ELEVATED PSA: Primary | ICD-10-CM

## 2022-02-02 NOTE — TELEPHONE ENCOUNTER
Patient called back and was informed of response. Patient's appointment rescheduled for 6 months with PSA prior.

## 2022-02-02 NOTE — TELEPHONE ENCOUNTER
Please review PSA results. Patient scheduled for an appointment on 2/4/22. Keep as planned or reschedule?

## 2022-02-02 NOTE — TELEPHONE ENCOUNTER
Please let him know that his PSA was 5.39. This is down from last year which was 6.73. We are okay with him canceling his appointment and rechecking this PSA in 6 months with a follow-up appointment at that point.       Patient did have a negative prostate biopsy 7/2021

## 2022-03-08 ENCOUNTER — APPOINTMENT (OUTPATIENT)
Dept: GENERAL RADIOLOGY | Age: 67
End: 2022-03-08
Payer: MEDICARE

## 2022-03-08 ENCOUNTER — HOSPITAL ENCOUNTER (OUTPATIENT)
Age: 67
Setting detail: OBSERVATION
Discharge: HOME | End: 2022-03-09
Attending: EMERGENCY MEDICINE | Admitting: STUDENT IN AN ORGANIZED HEALTH CARE EDUCATION/TRAINING PROGRAM
Payer: MEDICARE

## 2022-03-08 ENCOUNTER — HOSPITAL ENCOUNTER (OUTPATIENT)
Dept: NON INVASIVE DIAGNOSTICS | Age: 67
Discharge: HOME | End: 2022-03-08
Payer: MEDICARE

## 2022-03-08 ENCOUNTER — TELEPHONE (OUTPATIENT)
Dept: CARDIOLOGY | Age: 67
End: 2022-03-08

## 2022-03-08 DIAGNOSIS — I20.0 UNSTABLE ANGINA PECTORIS (HCC): Primary | ICD-10-CM

## 2022-03-08 LAB
ABSOLUTE EOS #: 0.1 K/UL (ref 0–0.44)
ABSOLUTE IMMATURE GRANULOCYTE: <0.03 K/UL (ref 0–0.3)
ABSOLUTE LYMPH #: 1.25 K/UL (ref 1.1–3.7)
ABSOLUTE MONO #: 0.43 K/UL (ref 0.1–1.2)
ALBUMIN SERPL-MCNC: 4.7 G/DL (ref 3.5–5.2)
ALBUMIN/GLOBULIN RATIO: 1.7 (ref 1–2.5)
ALP BLD-CCNC: 90 U/L (ref 40–129)
ALT SERPL-CCNC: 52 U/L (ref 5–41)
ANION GAP SERPL CALCULATED.3IONS-SCNC: 12 MMOL/L (ref 9–17)
AST SERPL-CCNC: 48 U/L
BASOPHILS # BLD: 2 % (ref 0–2)
BASOPHILS ABSOLUTE: 0.08 K/UL (ref 0–0.2)
BILIRUB SERPL-MCNC: 1.09 MG/DL (ref 0.3–1.2)
BUN BLDV-MCNC: 13 MG/DL (ref 8–23)
BUN/CREAT BLD: 17 (ref 9–20)
CALCIUM SERPL-MCNC: 9.7 MG/DL (ref 8.6–10.4)
CHLORIDE BLD-SCNC: 96 MMOL/L (ref 98–107)
CO2: 27 MMOL/L (ref 20–31)
CREAT SERPL-MCNC: 0.75 MG/DL (ref 0.7–1.2)
EKG ATRIAL RATE: 87 BPM
EKG P AXIS: 45 DEGREES
EKG P-R INTERVAL: 162 MS
EKG Q-T INTERVAL: 396 MS
EKG QRS DURATION: 128 MS
EKG QTC CALCULATION (BAZETT): 476 MS
EKG R AXIS: -53 DEGREES
EKG T AXIS: 37 DEGREES
EKG VENTRICULAR RATE: 87 BPM
EOSINOPHILS RELATIVE PERCENT: 2 % (ref 1–4)
GFR AFRICAN AMERICAN: >60 ML/MIN
GFR NON-AFRICAN AMERICAN: >60 ML/MIN
GFR SERPL CREATININE-BSD FRML MDRD: ABNORMAL ML/MIN/{1.73_M2}
GFR SERPL CREATININE-BSD FRML MDRD: ABNORMAL ML/MIN/{1.73_M2}
GLUCOSE BLD-MCNC: 109 MG/DL (ref 70–99)
HCT VFR BLD CALC: 46.2 % (ref 40.7–50.3)
HEMOGLOBIN: 15.3 G/DL (ref 13–17)
IMMATURE GRANULOCYTES: 0 %
LV EF: 55 %
LVEF MODALITY: NORMAL
LYMPHOCYTES # BLD: 24 % (ref 24–43)
MCH RBC QN AUTO: 31.8 PG (ref 25.2–33.5)
MCHC RBC AUTO-ENTMCNC: 33.1 G/DL (ref 28.4–34.8)
MCV RBC AUTO: 96 FL (ref 82.6–102.9)
MONOCYTES # BLD: 8 % (ref 3–12)
NRBC AUTOMATED: 0 PER 100 WBC
PDW BLD-RTO: 14.3 % (ref 11.8–14.4)
PLATELET # BLD: 205 K/UL (ref 138–453)
PMV BLD AUTO: 9.6 FL (ref 8.1–13.5)
POTASSIUM SERPL-SCNC: 4.1 MMOL/L (ref 3.7–5.3)
PRO-BNP: 25 PG/ML
RBC # BLD: 4.81 M/UL (ref 4.21–5.77)
SEG NEUTROPHILS: 64 % (ref 36–65)
SEGMENTED NEUTROPHILS ABSOLUTE COUNT: 3.26 K/UL (ref 1.5–8.1)
SODIUM BLD-SCNC: 135 MMOL/L (ref 135–144)
TOTAL PROTEIN: 7.4 G/DL (ref 6.4–8.3)
TROPONIN, HIGH SENSITIVITY: 7 NG/L (ref 0–22)
TROPONIN, HIGH SENSITIVITY: 7 NG/L (ref 0–22)
WBC # BLD: 5.1 K/UL (ref 3.5–11.3)

## 2022-03-08 PROCEDURE — 6370000000 HC RX 637 (ALT 250 FOR IP): Performed by: STUDENT IN AN ORGANIZED HEALTH CARE EDUCATION/TRAINING PROGRAM

## 2022-03-08 PROCEDURE — 94761 N-INVAS EAR/PLS OXIMETRY MLT: CPT

## 2022-03-08 PROCEDURE — 85025 COMPLETE CBC W/AUTO DIFF WBC: CPT

## 2022-03-08 PROCEDURE — 93005 ELECTROCARDIOGRAM TRACING: CPT | Performed by: EMERGENCY MEDICINE

## 2022-03-08 PROCEDURE — 83880 ASSAY OF NATRIURETIC PEPTIDE: CPT

## 2022-03-08 PROCEDURE — 96372 THER/PROPH/DIAG INJ SC/IM: CPT

## 2022-03-08 PROCEDURE — 80053 COMPREHEN METABOLIC PANEL: CPT

## 2022-03-08 PROCEDURE — 2580000003 HC RX 258: Performed by: STUDENT IN AN ORGANIZED HEALTH CARE EDUCATION/TRAINING PROGRAM

## 2022-03-08 PROCEDURE — 6370000000 HC RX 637 (ALT 250 FOR IP): Performed by: EMERGENCY MEDICINE

## 2022-03-08 PROCEDURE — G0378 HOSPITAL OBSERVATION PER HR: HCPCS

## 2022-03-08 PROCEDURE — 2500000003 HC RX 250 WO HCPCS

## 2022-03-08 PROCEDURE — G0378 HOSPITAL OBSERVATION PER HR: HCPCS | Performed by: FAMILY MEDICINE

## 2022-03-08 PROCEDURE — 93010 ELECTROCARDIOGRAM REPORT: CPT | Performed by: INTERNAL MEDICINE

## 2022-03-08 PROCEDURE — 36415 COLL VENOUS BLD VENIPUNCTURE: CPT

## 2022-03-08 PROCEDURE — 84484 ASSAY OF TROPONIN QUANT: CPT

## 2022-03-08 PROCEDURE — 71045 X-RAY EXAM CHEST 1 VIEW: CPT

## 2022-03-08 PROCEDURE — 6360000004 HC RX CONTRAST MEDICATION: Performed by: INTERNAL MEDICINE

## 2022-03-08 PROCEDURE — 6360000002 HC RX W HCPCS

## 2022-03-08 PROCEDURE — 99284 EMERGENCY DEPT VISIT MOD MDM: CPT

## 2022-03-08 PROCEDURE — C8929 TTE W OR WO FOL WCON,DOPPLER: HCPCS

## 2022-03-08 PROCEDURE — 99204 OFFICE O/P NEW MOD 45 MIN: CPT | Performed by: PHYSICIAN ASSISTANT

## 2022-03-08 PROCEDURE — 6360000002 HC RX W HCPCS: Performed by: STUDENT IN AN ORGANIZED HEALTH CARE EDUCATION/TRAINING PROGRAM

## 2022-03-08 RX ORDER — EZETIMIBE 10 MG/1
10 TABLET ORAL DAILY
Status: DISCONTINUED | OUTPATIENT
Start: 2022-03-09 | End: 2022-03-09 | Stop reason: HOSPADM

## 2022-03-08 RX ORDER — CARVEDILOL 3.12 MG/1
3.12 TABLET ORAL 2 TIMES DAILY
Status: DISCONTINUED | OUTPATIENT
Start: 2022-03-08 | End: 2022-03-09 | Stop reason: HOSPADM

## 2022-03-08 RX ORDER — ONDANSETRON 2 MG/ML
4 INJECTION INTRAMUSCULAR; INTRAVENOUS EVERY 6 HOURS PRN
Status: DISCONTINUED | OUTPATIENT
Start: 2022-03-08 | End: 2022-03-09 | Stop reason: HOSPADM

## 2022-03-08 RX ORDER — SODIUM CHLORIDE 9 MG/ML
25 INJECTION, SOLUTION INTRAVENOUS PRN
Status: DISCONTINUED | OUTPATIENT
Start: 2022-03-08 | End: 2022-03-09 | Stop reason: HOSPADM

## 2022-03-08 RX ORDER — POLYETHYLENE GLYCOL 3350 17 G/17G
17 POWDER, FOR SOLUTION ORAL DAILY PRN
Status: DISCONTINUED | OUTPATIENT
Start: 2022-03-08 | End: 2022-03-09 | Stop reason: HOSPADM

## 2022-03-08 RX ORDER — HYDROCHLOROTHIAZIDE 25 MG/1
25 TABLET ORAL DAILY
Status: DISCONTINUED | OUTPATIENT
Start: 2022-03-09 | End: 2022-03-09 | Stop reason: HOSPADM

## 2022-03-08 RX ORDER — FAMOTIDINE 20 MG/1
20 TABLET, FILM COATED ORAL 2 TIMES DAILY
Status: DISCONTINUED | OUTPATIENT
Start: 2022-03-08 | End: 2022-03-09 | Stop reason: HOSPADM

## 2022-03-08 RX ORDER — ASPIRIN 81 MG/1
324 TABLET, CHEWABLE ORAL ONCE
Status: COMPLETED | OUTPATIENT
Start: 2022-03-08 | End: 2022-03-08

## 2022-03-08 RX ORDER — SODIUM CHLORIDE 9 MG/ML
INJECTION, SOLUTION INTRAVENOUS CONTINUOUS
Status: DISCONTINUED | OUTPATIENT
Start: 2022-03-08 | End: 2022-03-09

## 2022-03-08 RX ORDER — ASPIRIN 81 MG/1
81 TABLET ORAL DAILY
Status: DISCONTINUED | OUTPATIENT
Start: 2022-03-09 | End: 2022-03-09 | Stop reason: HOSPADM

## 2022-03-08 RX ORDER — LISINOPRIL 10 MG/1
10 TABLET ORAL DAILY
Status: DISCONTINUED | OUTPATIENT
Start: 2022-03-09 | End: 2022-03-09 | Stop reason: HOSPADM

## 2022-03-08 RX ORDER — ACETAMINOPHEN 325 MG/1
650 TABLET ORAL EVERY 6 HOURS PRN
Status: DISCONTINUED | OUTPATIENT
Start: 2022-03-08 | End: 2022-03-09 | Stop reason: HOSPADM

## 2022-03-08 RX ORDER — SODIUM CHLORIDE 0.9 % (FLUSH) 0.9 %
10 SYRINGE (ML) INJECTION PRN
Status: DISCONTINUED | OUTPATIENT
Start: 2022-03-08 | End: 2022-03-09 | Stop reason: HOSPADM

## 2022-03-08 RX ORDER — SODIUM CHLORIDE 0.9 % (FLUSH) 0.9 %
10 SYRINGE (ML) INJECTION EVERY 12 HOURS SCHEDULED
Status: DISCONTINUED | OUTPATIENT
Start: 2022-03-08 | End: 2022-03-09 | Stop reason: HOSPADM

## 2022-03-08 RX ORDER — ACETAMINOPHEN 650 MG/1
650 SUPPOSITORY RECTAL EVERY 6 HOURS PRN
Status: DISCONTINUED | OUTPATIENT
Start: 2022-03-08 | End: 2022-03-09 | Stop reason: HOSPADM

## 2022-03-08 RX ORDER — ONDANSETRON 4 MG/1
4 TABLET, ORALLY DISINTEGRATING ORAL EVERY 8 HOURS PRN
Status: DISCONTINUED | OUTPATIENT
Start: 2022-03-08 | End: 2022-03-09 | Stop reason: HOSPADM

## 2022-03-08 RX ORDER — ATORVASTATIN CALCIUM 40 MG/1
80 TABLET, FILM COATED ORAL DAILY
Status: DISCONTINUED | OUTPATIENT
Start: 2022-03-09 | End: 2022-03-09 | Stop reason: HOSPADM

## 2022-03-08 RX ADMIN — CARVEDILOL 3.12 MG: 3.12 TABLET, FILM COATED ORAL at 20:26

## 2022-03-08 RX ADMIN — ASPIRIN 81 MG 324 MG: 81 TABLET ORAL at 16:06

## 2022-03-08 RX ADMIN — ENOXAPARIN SODIUM 40 MG: 100 INJECTION SUBCUTANEOUS at 19:40

## 2022-03-08 RX ADMIN — PERFLUTREN 1.65 MG: 6.52 INJECTION, SUSPENSION INTRAVENOUS at 16:33

## 2022-03-08 RX ADMIN — SODIUM CHLORIDE: 9 INJECTION, SOLUTION INTRAVENOUS at 19:43

## 2022-03-08 ASSESSMENT — ENCOUNTER SYMPTOMS
COUGH: 0
ABDOMINAL DISTENTION: 0
CONSTIPATION: 0
RHINORRHEA: 0
SHORTNESS OF BREATH: 1
WHEEZING: 0
SORE THROAT: 0
VOMITING: 0
NAUSEA: 1
DIARRHEA: 0

## 2022-03-08 NOTE — ED NOTES
Report called to Sreekanth Prather     June Saint [place], [facility]  03/08/22 6670

## 2022-03-08 NOTE — ED PROVIDER NOTES
file    Highest education level: Not on file   Occupational History    Not on file   Tobacco Use    Smoking status: Former Smoker     Packs/day: 0.05     Years: 25.00     Pack years: 1.25     Quit date: 1995     Years since quittin.5    Smokeless tobacco: Never Used    Tobacco comment: quit 20 years ago   Vaping Use    Vaping Use: Never used   Substance and Sexual Activity    Alcohol use: Yes     Comment: occas    Drug use: No    Sexual activity: Not on file   Other Topics Concern    Not on file   Social History Narrative    Not on file     Social Determinants of Health     Financial Resource Strain:     Difficulty of Paying Living Expenses: Not on file   Food Insecurity:     Worried About Running Out of Food in the Last Year: Not on file    Kam of Food in the Last Year: Not on file   Transportation Needs:     Lack of Transportation (Medical): Not on file    Lack of Transportation (Non-Medical):  Not on file   Physical Activity:     Days of Exercise per Week: Not on file    Minutes of Exercise per Session: Not on file   Stress:     Feeling of Stress : Not on file   Social Connections:     Frequency of Communication with Friends and Family: Not on file    Frequency of Social Gatherings with Friends and Family: Not on file    Attends Latter-day Services: Not on file    Active Member of [address] [place] [address] sharing.it or Organizations: Not on file    Attends Club or Organization Meetings: Not on file    Marital Status: Not on file   Intimate Partner Violence:     Fear of Current or Ex-Partner: Not on file    Emotionally Abused: Not on file    Physically Abused: Not on file    Sexually Abused: Not on file   Housing Stability:     Unable to Pay for Housing in the Last Year: Not on file    Number of [place] in the Last Year: Not on file    Unstable Housing in the Last Year: Not on file       Family History   Problem Relation Age of Onset    Kidney Disease Father        Allergies:  Patient has no known allergies. Home Medications:  Prior to Admission medications    Medication Sig Start Date End Date Taking? Authorizing Provider   etodolac (LODINE) 400 MG tablet Take 1 tablet by mouth 2 times daily 5/28/21  Yes Oxana Salinas MD   [facility]) 60 MG TABS tablet Take 1 tablet by mouth 2 times daily 5/10/21  Yes Joe Jolly MD   carvedilol (COREG) 3.125 MG tablet Take 1 tablet by mouth 2 times daily 5/10/21  Yes Joe Jolly MD   ezetimibe (ZETIA) 10 MG tablet TAKE ONE TABLET BY MOUTH DAILY 2/13/19  Yes Joe oJlly MD   nitroGLYCERIN (NITROLINGUAL) 0.4 MG/SPRAY 0.4 mg spray Place 2 sprays under the tongue every 5 minutes as needed for Chest pain 5/10/18  Yes Joe Jolly MD   lisinopril (PRINIVIL;ZESTRIL) 10 MG tablet Take 10 mg by mouth daily   Yes Historical Provider, MD   atorvastatin (LIPITOR) 80 MG tablet Take 80 mg by mouth daily   Yes Historical Provider, MD   hydrochlorothiazide (HYDRODIURIL) 25 MG tablet Take 25 mg by mouth daily. Yes Historical Provider, MD   aspirin 81 MG EC tablet Take 81 mg by mouth daily. Yes Historical Provider, MD       REVIEW OF SYSTEMS    (2-9 systems for level 4, 10 or more for level 5)      Review of Systems   Constitutional: Negative for activity change, appetite change, fatigue and fever. HENT: Negative for congestion, rhinorrhea and sore throat. Respiratory: Positive for shortness of breath. Negative for cough and wheezing. Cardiovascular: Positive for chest pain. Negative for palpitations and leg swelling. Gastrointestinal: Positive for nausea. Negative for abdominal distention, constipation, diarrhea and vomiting. Genitourinary: Negative for decreased urine volume and dysuria. Skin: Negative for rash. Neurological: Negative for dizziness, weakness, light-headedness, numbness and headaches.        PHYSICAL EXAM   (up to 7 for level 4, 8 or more for level 5)     INITIAL VITALS:   /63   Pulse 72   Temp 97.9 °F (36.6 °C) (Tympanic)   Resp 15   Wt 270 lb (122.5 kg)   SpO2 95%   BMI 39.87 kg/m²     Physical Exam  Constitutional:       General: He is not in acute distress. Appearance: Normal appearance. He is not ill-appearing. HENT:      Head: Normocephalic and atraumatic. Eyes:      General:         Right eye: No discharge. Left eye: No discharge. Extraocular Movements: Extraocular movements intact. Pupils: Pupils are equal, round, and reactive to light. Cardiovascular:      Rate and Rhythm: Normal rate. Pulmonary:      Effort: Pulmonary effort is normal. No respiratory distress. Breath sounds: No decreased breath sounds, wheezing, rhonchi or rales. Musculoskeletal:      Right lower leg: Edema present. Left lower leg: Edema present. Comments: 1+ edema bilateral LE, no calf ttp bilaterally   Neurological:      General: No focal deficit present. Mental Status: He is alert and oriented to person, place, and time. DIFFERENTIAL  DIAGNOSIS     Chest pain, nausea, history of cardiac disease. Will check labs EKG troponins speak with cardiology, will cover with aspirin as patient does have concerning symptoms.     PLAN (LABS / IMAGING / EKG):  Orders Placed This Encounter   Procedures    XR CHEST PORTABLE    CBC with Auto Differential    Comprehensive Metabolic Panel    Troponin    Brain Natriuretic Peptide    Troponin    Remote cardiac monitor    EKG 12 Lead    Echocardiogram complete 2D with doppler with color    Insert peripheral IV    Place in Observation Service       MEDICATIONS ORDERED:  Orders Placed This Encounter   Medications    aspirin chewable tablet 324 mg    perflutren lipid microspheres (DEFINITY) injection 1.65 mg       DIAGNOSTIC RESULTS / EMERGENCY DEPARTMENT COURSE / MDM     LABS:  Results for orders placed or performed during the hospital encounter of 03/08/22   CBC with Auto Differential   Result Value Ref Range    WBC 5.1 3.5 - 11.3 k/uL RBC 4.81 4.21 - 5.77 m/uL    Hemoglobin 15.3 13.0 - 17.0 g/dL    Hematocrit 46.2 40.7 - 50.3 %    MCV 96.0 82.6 - 102.9 fL    MCH 31.8 25.2 - 33.5 pg    MCHC 33.1 28.4 - 34.8 g/dL    RDW 14.3 11.8 - 14.4 %    Platelets 335 543 - 804 k/uL    MPV 9.6 8.1 - 13.5 fL    NRBC Automated 0.0 0.0 per 100 WBC    Seg Neutrophils 64 36 - 65 %    Lymphocytes 24 24 - 43 %    Monocytes 8 3 - 12 %    Eosinophils % 2 1 - 4 %    Basophils 2 0 - 2 %    Immature Granulocytes 0 0 %    Segs Absolute 3.26 1.50 - 8.10 k/uL    Absolute Lymph # 1.25 1.10 - 3.70 k/uL    Absolute Mono # 0.43 0.10 - 1.20 k/uL    Absolute Eos # 0.10 0.00 - 0.44 k/uL    Basophils Absolute 0.08 0.00 - 0.20 k/uL    Absolute Immature Granulocyte <0.03 0.00 - 0.30 k/uL   Comprehensive Metabolic Panel   Result Value Ref Range    Glucose 109 (H) 70 - 99 mg/dL    BUN 13 8 - 23 mg/dL    CREATININE 0.75 0.70 - 1.20 mg/dL    Bun/Cre Ratio 17 9 - 20    Calcium 9.7 8.6 - 10.4 mg/dL    Sodium 135 135 - 144 mmol/L    Potassium 4.1 3.7 - 5.3 mmol/L    Chloride 96 (L) 98 - 107 mmol/L    CO2 27 20 - 31 mmol/L    Anion Gap 12 9 - 17 mmol/L    Alkaline Phosphatase 90 40 - 129 U/L    ALT 52 (H) 5 - 41 U/L    AST 48 (H) <40 U/L    Total Bilirubin 1.09 0.3 - 1.2 mg/dL    Total Protein 7.4 6.4 - 8.3 g/dL    Albumin 4.7 3.5 - 5.2 g/dL    Albumin/Globulin Ratio 1.7 1.0 - 2.5    GFR Non-African American >60 >60 mL/min    GFR African American >60 >60 mL/min    GFR Comment          GFR Staging         Troponin   Result Value Ref Range    Troponin, High Sensitivity 7 0 - 22 ng/L   Brain Natriuretic Peptide   Result Value Ref Range    Pro-BNP 25 <300 pg/mL   Troponin   Result Value Ref Range    Troponin, High Sensitivity 7 0 - 22 ng/L   EKG 12 Lead   Result Value Ref Range    Ventricular Rate 87 BPM    Atrial Rate 87 BPM    P-R Interval 162 ms    QRS Duration 128 ms    Q-T Interval 396 ms    QTc Calculation (Bazett) 476 ms    P Axis 45 degrees    R Axis -53 degrees    T Axis 37 degrees IMPRESSION: chest pain    RADIOLOGY:  XR CHEST PORTABLE   Final Result   No acute process. EKG:  EKG shows normal sinus rhythm, right bundle branch block is noted with some depressions noted in the lateral leads. There is a left axis deviation with a left anterior fascicular block. EMERGENCY DEPARTMENT COURSE:  Patient was made to admit the patient. I spoke with Dr. Jeremy Villanueva who agrees. We will plan on echo and additional testing tomorrow. Patient was updated with plan he initially is hesitant to admission but agreeable. I did speak with hospitalist Dr. Leslie Mora      1. Unstable angina pectoris ([facility] [address]          DISPOSITION / PLAN     DISPOSITION Admitted 03/08/2022 04:04:31 PM      PATIENT REFERRED TO:  No follow-up provider specified.     DISCHARGE MEDICATIONS:  New Prescriptions    No medications on file       Enmanuel Arevalo DO  4:49 PM    Attending Emergency Physician  [facility] ED    (Please note that portions of this note were completed with a voice recognition program.  Effortswere made to edit the dictations but occasionally words are mis-transcribed.)              Iain Bryant DO  03/08/22 2586

## 2022-03-08 NOTE — TELEPHONE ENCOUNTER
Mr. Natasha Mariscal came to the window reporting having CP for the last couple days on and off and most recently just happening this AM. He did take his  Nitro and it did help. Per Dr. Chidi Gutierrez - he wants him to go to ER to have EKG, chest XR, echo and labs completed. I let Mr. Natasha Mariscal know this info and he verbalized understanding.

## 2022-03-08 NOTE — CONSULTS
Patient: Olga Mckoy  : 1955  Date of Visit: 2022    REASON FOR VISIT / CONSULTATION: Chest Pain (intermittent x1 week, left sided, states had one episode of chest pain this am lasting approx 1 hour which improved after use of Nitro) and Shortness of Breath (worsening over the last year)      Dear Dr. Nurys Nelson MD    I had the pleasure of seeing Olga Mckoy in the ER for left sided chest pain, nausea, and shortness of breath that has worsened over the past year. Mr. De Nathan is a 74 y.o. male with a history of atherosclerotic heart disease. 1-Mr. De Nathan has history of coronary artery disease with 3 stents placed back in . Another stent in . With regard to symptoms prior to his PCI's, patient said he usually has epigastric pain and excessive fatigue. No clear history of myocardial infarction. No history of heart failure or significant arrhythmia.     2-He has history of hypertension, dyslipidemia and obstructive sleep apnea ( unable to use CPAP). No clear family history of premature CAD.     3-He is admitted to hospital on 2018 with left-sided chest heaviness. Pain occurred at rest.  No significant radiation. Not associated with any sweating or diaphoresis. Pain relieved in the emergency room after taking 2 sublingual nitroglycerin. 4-S/P Cardiac cath on 2018 95% in stent-restenosis in the proximal LAD with a 95% ostial stenosis in a fairly large D1 branch S/P DESx2 to mid LAD and PTCA-MATTHEW to ostial D1    5-EKG done in office on 2019 showed sinus rhythm with APCs, no acute ischemic changes compared to prior ECGs. 6-Echo on 2020: Normal ejection fraction and wall motion. No significant valvular abnormalities. Mr. De Nathan has been having chest discomfort off and on over the past several months.  He describes his chest pain as a sharp and stabbing sensation over the left side of his chest. His discomfort typically last minutes but lasted 1 hour this morning. He tried taking his  nitro. He reports once he got up and walked around he felt a little better. He had nausea, lightheadedness, in addition to shortness of breath earlier today. He reports his shortness of breath has gotten worse, even sitting he can become short of breath. He reports he has been more short of breath over the past year. He has been noticing some leg swelling, this is not new for him. He denies any current chest pain, pressure, or tightness. He denies any lightheaded/dizziness or any palpitations. He denies any problems with current medications. No vomiting. No issues moving his bowls. He denies any blood in his urine or stool. Chart was reviewed: BNP: WNL, troponin not elevated, CMP showed elevated AST and ALT. Chest XR unremarkable    Past Medical History:   Diagnosis Date    CAD (coronary artery disease)     s/p multiple stents     History of DVT (deep vein thrombosis)     right calf DVT    Hyperlipidemia     Hypertension     Morbid obesity ([address]        CURRENT ALLERGIES: Patient has no known allergies. REVIEW OF SYSTEMS: 14 systems were reviewed. Pertinent positives and negatives as above, all else negative. Past Surgical History:   Procedure Laterality Date    CARDIAC CATHETERIZATION Left 2018    Right radial/[facility]/Dr Wren/95% in stent-restenosis in the proximal LAD with a 95% ostial 95% stenosis in a fairly large D1 branch that appears to be jailed.  Normal LVEDP    COLONOSCOPY  2019    COLONOSCOPY N/A 2019    COLONOSCOPY POLYPECTOMY SNARE/COLD BIOPSY performed by Uriel Resendez DO at [address] [place] [address]      4 stents placed- 7 total    KNEE SURGERY      PROSTATE SURGERY  2021    Prostate biopsy    SHOULDER SURGERY      Social History:  Social History     Tobacco Use    Smoking status: Former Smoker     Packs/day: 0.05     Years: 25.00     Pack years: 1.25     Quit date: 1995     Years since quittin.5    Smokeless tobacco: Never Used    Tobacco comment: quit 20 years ago   Vaping Use    Vaping Use: Never used   Substance Use Topics    Alcohol use: Yes     Comment: occas    Drug use: No        CURRENT MEDICATIONS:  Outpatient Medications Marked as Taking for the 3/8/22 encounter [facility] Encounter)   Medication Sig Dispense Refill    etodolac (LODINE) 400 MG tablet Take 1 tablet by mouth 2 times daily 30 tablet 0    ticagrelor (BRILINTA) 60 MG TABS tablet Take 1 tablet by mouth 2 times daily 180 tablet 3    carvedilol (COREG) 3.125 MG tablet Take 1 tablet by mouth 2 times daily 180 tablet 3    ezetimibe (ZETIA) 10 MG tablet TAKE ONE TABLET BY MOUTH DAILY 90 tablet 3    nitroGLYCERIN (NITROLINGUAL) 0.4 MG/SPRAY 0.4 mg spray Place 2 sprays under the tongue every 5 minutes as needed for Chest pain 1 Bottle 3    lisinopril (PRINIVIL;ZESTRIL) 10 MG tablet Take 10 mg by mouth daily      atorvastatin (LIPITOR) 80 MG tablet Take 80 mg by mouth daily      hydrochlorothiazide (HYDRODIURIL) 25 MG tablet Take 25 mg by mouth daily.  aspirin 81 MG EC tablet Take 81 mg by mouth daily. FAMILY HISTORY: family history includes Kidney Disease in his father. PHYSICAL EXAM:   /79   Pulse 76   Temp 97.9 °F (36.6 °C) (Tympanic)   Resp 16   Wt 270 lb (122.5 kg)   SpO2 95%   BMI 39.87 kg/m²  Body mass index is 39.87 kg/m². Constitutional: He is oriented to person, place, and time. He appears well-developed and well-nourished. In no acute distress. HEENT: Normocephalic and atraumatic. No JVD present. Carotid bruit is not present. No mass and no thyromegaly present. No lymphadenopathy present. Cardiovascular: Normal rate, regular rhythm, normal heart sounds. Exam reveals no gallop and no friction rubs. No heart murmur heard. Pulmonary/Chest: Effort normal and breath sounds normal. No respiratory distress. He has no wheezes, rhonchi or rales. Abdominal: Soft, non-tender. Bowel sounds and aorta are normal. He exhibits no organomegaly, mass or bruit. Extremities: Mild lower extremity edema. 1/2 way up to the knees. No cyanosis and no clubbing. Pulses are 2+ radial and carotid pulses. 2+ dorsalis pedis and posterior tibial pulses bilaterally. Chronic vascular changes. Neurological: He is alert and oriented to person, place, and time. No evidence of gross cranial nerve deficit. Coordination appeared normal.   Skin: Skin is warm and dry. There is no rash or diaphoresis. Psychiatric: He has a normal mood and affect. His speech is normal and behavior is normal.        MOST RECENT LABS ON RECORD:   Lab Results   Component Value Date    WBC 5.1 03/08/2022    HGB 15.3 03/08/2022    HCT 46.2 03/08/2022     03/08/2022    CHOL 116 02/19/2020    TRIG 52 02/19/2020    HDL 50 02/19/2020    ALT 52 (H) 03/08/2022    AST 48 (H) 03/08/2022     03/08/2022    K 4.1 03/08/2022    CL 96 (L) 03/08/2022    CREATININE 0.75 03/08/2022    BUN 13 03/08/2022    CO2 27 03/08/2022    PSA 5.39 02/01/2022    INR 1.0 04/17/2018    LABA1C 5.7 02/12/2019       ASSESSMENT:  No diagnosis found. UNSTABLE ANGINA  ASHD  HTN  Hyperlipidemia  Morbid Obesity    PLAN:    · Unstable angina  · Medical Management for Suspected coronary artery disease:   Antiplatelet Agent: Continue aspirin 81 mg daily and Ticagrelor (Brilinta) 60 mg twice daily.  Beta Blocker: Continue Carvedilol (Coreg) 3.125 mg twice daily.  Anti-anginal medications: Continue nitroglycerin 0.4 mg tablets as needed for chest pain.  Cholesterol Reduction Therapy: Continue Atorvastatin (Lipitor) 80 mg daily.  Additional Testing List: I ordered a echocardiogram to better assess for the etiology of this problem and to help guide future management   We may also consider a cardiac catheterization tomorrow to further evaluate his unstable angina, Dr Jessica Weldon will discuss with him further.     Atherosclerotic Heart Disease: S/P Multiple PCIs as outlined in HPI. Antiplatelet Agent: Continue aspirin 81 mg daily and Ticagrelor (Brilinta) 60 mg twice daily. Beta Blocker: Continue carvedilol (Coreg) 3.125 mg. twice daily. Statin Therapy: Continue atorvastatin (Lipitor) 80 mg nightly. And also continue Zetia 10 mg daily. Counseled patient extensively to come to the emergency room with he has persistent chest pain or worsening shortness of breath as this can be life threatening. I reviewed blood work that was done by Dr. Keith Galan MD \" scanned in media\" excellent lipid panel. Hemoglobin A1c 6.0%, kidney function is normal.  Serum potassium is normal.    Essential Hypertension: Controlled  ACE Inibitor/ARB: Continue lisinopril 10 mg once daily. Diuretics: Continue hydrochlorothiazide  25 mg once daily. I told him to watch for any increased lightheadedness or dizziness and call if this develops   Beta Blocker: Continue carvedilol (Coreg)      · Hyperlipidemia: Mixed, LDL done on 3/1/2021 was 64 mg/dL   · Statin Therapy: Continue atorvastatin (Lipitor) 80 mg nightly. Continue Zetia 10 mg daily. Morbid obesity: Body mass index is 39.87 kg/m². I also briefly discussed both diet and exercise strategies for him to continue to loses weight and he was very receptive to this. I do think he warrants admission for further work-up of his unstable angina. I discussed patient's symptoms and treatment plan with Dr Marie Humphries, he was in agreement with the plan and follow up. Sincerely,  [facility] Cardiology Specialist    [address] [facility]  Phone: [phone], Fax: [phone]     I believe that the risk of significant morbidity and mortality related to the patient's current medical conditions are: high.          March 8, 2022

## 2022-03-09 VITALS
RESPIRATION RATE: 16 BRPM | HEIGHT: 69 IN | WEIGHT: 282.19 LBS | OXYGEN SATURATION: 96 % | BODY MASS INDEX: 41.8 KG/M2 | HEART RATE: 71 BPM | DIASTOLIC BLOOD PRESSURE: 58 MMHG | SYSTOLIC BLOOD PRESSURE: 116 MMHG | TEMPERATURE: 97.3 F

## 2022-03-09 LAB
ABSOLUTE EOS #: 0.19 K/UL (ref 0–0.44)
ABSOLUTE IMMATURE GRANULOCYTE: <0.03 K/UL (ref 0–0.3)
ABSOLUTE LYMPH #: 1.47 K/UL (ref 1.1–3.7)
ABSOLUTE MONO #: 0.49 K/UL (ref 0.1–1.2)
ANION GAP SERPL CALCULATED.3IONS-SCNC: 9 MMOL/L (ref 9–17)
BASOPHILS # BLD: 1 % (ref 0–2)
BASOPHILS ABSOLUTE: 0.05 K/UL (ref 0–0.2)
BUN BLDV-MCNC: 11 MG/DL (ref 8–23)
BUN/CREAT BLD: 17 (ref 9–20)
CALCIUM SERPL-MCNC: 9 MG/DL (ref 8.6–10.4)
CHLORIDE BLD-SCNC: 100 MMOL/L (ref 98–107)
CHOLESTEROL/HDL RATIO: 3.1
CHOLESTEROL: 118 MG/DL
CO2: 29 MMOL/L (ref 20–31)
CREAT SERPL-MCNC: 0.66 MG/DL (ref 0.7–1.2)
EOSINOPHILS RELATIVE PERCENT: 4 % (ref 1–4)
ESTIMATED AVERAGE GLUCOSE: 128 MG/DL
GFR AFRICAN AMERICAN: >60 ML/MIN
GFR NON-AFRICAN AMERICAN: >60 ML/MIN
GFR SERPL CREATININE-BSD FRML MDRD: ABNORMAL ML/MIN/{1.73_M2}
GFR SERPL CREATININE-BSD FRML MDRD: ABNORMAL ML/MIN/{1.73_M2}
GLUCOSE BLD-MCNC: 107 MG/DL (ref 70–99)
HBA1C MFR BLD: 6.1 % (ref 4–6)
HCT VFR BLD CALC: 42.7 % (ref 40.7–50.3)
HDLC SERPL-MCNC: 38 MG/DL
HEMOGLOBIN: 14.3 G/DL (ref 13–17)
IMMATURE GRANULOCYTES: 0 %
INR BLD: 1.1
LDL CHOLESTEROL: 49 MG/DL (ref 0–130)
LYMPHOCYTES # BLD: 28 % (ref 24–43)
MAGNESIUM: 1.7 MG/DL (ref 1.6–2.6)
MCH RBC QN AUTO: 32.1 PG (ref 25.2–33.5)
MCHC RBC AUTO-ENTMCNC: 33.5 G/DL (ref 28.4–34.8)
MCV RBC AUTO: 95.7 FL (ref 82.6–102.9)
MONOCYTES # BLD: 9 % (ref 3–12)
NRBC AUTOMATED: 0 PER 100 WBC
PARTIAL THROMBOPLASTIN TIME: 25 SEC (ref 26.8–34.8)
PDW BLD-RTO: 14.5 % (ref 11.8–14.4)
PLATELET # BLD: 165 K/UL (ref 138–453)
PMV BLD AUTO: 9.8 FL (ref 8.1–13.5)
POTASSIUM SERPL-SCNC: 3.5 MMOL/L (ref 3.7–5.3)
PROTHROMBIN TIME: 13.6 SEC (ref 11.5–14.2)
RBC # BLD: 4.46 M/UL (ref 4.21–5.77)
SEG NEUTROPHILS: 58 % (ref 36–65)
SEGMENTED NEUTROPHILS ABSOLUTE COUNT: 3.12 K/UL (ref 1.5–8.1)
SODIUM BLD-SCNC: 138 MMOL/L (ref 135–144)
TRIGL SERPL-MCNC: 153 MG/DL
TSH SERPL DL<=0.05 MIU/L-ACNC: 4.32 MIU/L (ref 0.3–5)
WBC # BLD: 5.3 K/UL (ref 3.5–11.3)

## 2022-03-09 PROCEDURE — 93458 L HRT ARTERY/VENTRICLE ANGIO: CPT | Performed by: FAMILY MEDICINE

## 2022-03-09 PROCEDURE — C1894 INTRO/SHEATH, NON-LASER: HCPCS

## 2022-03-09 PROCEDURE — 85610 PROTHROMBIN TIME: CPT

## 2022-03-09 PROCEDURE — 6370000000 HC RX 637 (ALT 250 FOR IP): Performed by: PHYSICIAN ASSISTANT

## 2022-03-09 PROCEDURE — G0378 HOSPITAL OBSERVATION PER HR: HCPCS

## 2022-03-09 PROCEDURE — 85730 THROMBOPLASTIN TIME PARTIAL: CPT

## 2022-03-09 PROCEDURE — C1887 CATHETER, GUIDING: HCPCS

## 2022-03-09 PROCEDURE — 93458 L HRT ARTERY/VENTRICLE ANGIO: CPT

## 2022-03-09 PROCEDURE — 2709999900 HC NON-CHARGEABLE SUPPLY

## 2022-03-09 PROCEDURE — 6370000000 HC RX 637 (ALT 250 FOR IP): Performed by: FAMILY MEDICINE

## 2022-03-09 PROCEDURE — 6360000004 HC RX CONTRAST MEDICATION: Performed by: STUDENT IN AN ORGANIZED HEALTH CARE EDUCATION/TRAINING PROGRAM

## 2022-03-09 PROCEDURE — 83036 HEMOGLOBIN GLYCOSYLATED A1C: CPT

## 2022-03-09 PROCEDURE — 97165 OT EVAL LOW COMPLEX 30 MIN: CPT

## 2022-03-09 PROCEDURE — 80061 LIPID PANEL: CPT

## 2022-03-09 PROCEDURE — 84443 ASSAY THYROID STIM HORMONE: CPT

## 2022-03-09 PROCEDURE — 80048 BASIC METABOLIC PNL TOTAL CA: CPT

## 2022-03-09 PROCEDURE — 97161 PT EVAL LOW COMPLEX 20 MIN: CPT

## 2022-03-09 PROCEDURE — C1769 GUIDE WIRE: HCPCS

## 2022-03-09 PROCEDURE — 99152 MOD SED SAME PHYS/QHP 5/>YRS: CPT

## 2022-03-09 PROCEDURE — 36415 COLL VENOUS BLD VENIPUNCTURE: CPT

## 2022-03-09 PROCEDURE — 94761 N-INVAS EAR/PLS OXIMETRY MLT: CPT

## 2022-03-09 PROCEDURE — 83735 ASSAY OF MAGNESIUM: CPT

## 2022-03-09 PROCEDURE — 99214 OFFICE O/P EST MOD 30 MIN: CPT | Performed by: PHYSICIAN ASSISTANT

## 2022-03-09 PROCEDURE — 85025 COMPLETE CBC W/AUTO DIFF WBC: CPT

## 2022-03-09 RX ORDER — DIPHENHYDRAMINE HCL 25 MG
50 CAPSULE ORAL ONCE
Status: DISCONTINUED | OUTPATIENT
Start: 2022-03-09 | End: 2022-03-09 | Stop reason: HOSPADM

## 2022-03-09 RX ORDER — AMLODIPINE BESYLATE 5 MG/1
5 TABLET ORAL DAILY
Status: DISCONTINUED | OUTPATIENT
Start: 2022-03-09 | End: 2022-03-09 | Stop reason: HOSPADM

## 2022-03-09 RX ORDER — NITROGLYCERIN 0.4 MG/1
0.4 TABLET SUBLINGUAL EVERY 5 MIN PRN
Status: DISCONTINUED | OUTPATIENT
Start: 2022-03-09 | End: 2022-03-09 | Stop reason: HOSPADM

## 2022-03-09 RX ORDER — AMLODIPINE BESYLATE 5 MG/1
5 TABLET ORAL DAILY
Qty: 30 TABLET | Refills: 3 | Status: SHIPPED | OUTPATIENT
Start: 2022-03-10

## 2022-03-09 RX ORDER — SODIUM CHLORIDE 9 MG/ML
25 INJECTION, SOLUTION INTRAVENOUS PRN
Status: DISCONTINUED | OUTPATIENT
Start: 2022-03-09 | End: 2022-03-09 | Stop reason: HOSPADM

## 2022-03-09 RX ORDER — SODIUM CHLORIDE 9 MG/ML
INJECTION, SOLUTION INTRAVENOUS CONTINUOUS
Status: DISCONTINUED | OUTPATIENT
Start: 2022-03-09 | End: 2022-03-09 | Stop reason: HOSPADM

## 2022-03-09 RX ORDER — SODIUM CHLORIDE 0.9 % (FLUSH) 0.9 %
5-40 SYRINGE (ML) INJECTION PRN
Status: DISCONTINUED | OUTPATIENT
Start: 2022-03-09 | End: 2022-03-09 | Stop reason: HOSPADM

## 2022-03-09 RX ORDER — ISOSORBIDE MONONITRATE 30 MG/1
30 TABLET, EXTENDED RELEASE ORAL DAILY
Qty: 30 TABLET | Refills: 3 | Status: SHIPPED | OUTPATIENT
Start: 2022-03-10

## 2022-03-09 RX ORDER — ACETAMINOPHEN 325 MG/1
650 TABLET ORAL EVERY 4 HOURS PRN
Status: DISCONTINUED | OUTPATIENT
Start: 2022-03-09 | End: 2022-03-09 | Stop reason: HOSPADM

## 2022-03-09 RX ORDER — ISOSORBIDE MONONITRATE 30 MG/1
30 TABLET, EXTENDED RELEASE ORAL DAILY
Status: DISCONTINUED | OUTPATIENT
Start: 2022-03-09 | End: 2022-03-09 | Stop reason: HOSPADM

## 2022-03-09 RX ORDER — SODIUM CHLORIDE 0.9 % (FLUSH) 0.9 %
5-40 SYRINGE (ML) INJECTION EVERY 12 HOURS SCHEDULED
Status: DISCONTINUED | OUTPATIENT
Start: 2022-03-09 | End: 2022-03-09 | Stop reason: HOSPADM

## 2022-03-09 RX ADMIN — LISINOPRIL 10 MG: 10 TABLET ORAL at 11:38

## 2022-03-09 RX ADMIN — FAMOTIDINE 20 MG: 20 TABLET, FILM COATED ORAL at 11:38

## 2022-03-09 RX ADMIN — AMLODIPINE BESYLATE 5 MG: 5 TABLET ORAL at 13:44

## 2022-03-09 RX ADMIN — HYDROCHLOROTHIAZIDE 25 MG: 25 TABLET ORAL at 11:38

## 2022-03-09 RX ADMIN — EZETIMIBE 10 MG: 10 TABLET ORAL at 11:38

## 2022-03-09 RX ADMIN — IOPAMIDOL 60 ML: 755 INJECTION, SOLUTION INTRAVENOUS at 08:42

## 2022-03-09 RX ADMIN — CARVEDILOL 3.12 MG: 3.12 TABLET, FILM COATED ORAL at 11:38

## 2022-03-09 RX ADMIN — ISOSORBIDE MONONITRATE 30 MG: 30 TABLET, EXTENDED RELEASE ORAL at 13:44

## 2022-03-09 RX ADMIN — ATORVASTATIN CALCIUM 80 MG: 40 TABLET, FILM COATED ORAL at 11:37

## 2022-03-09 NOTE — PLAN OF CARE
Problem: Falls - Risk of:  Goal: Will remain free from falls  Description: Will remain free from falls  Outcome: Ongoing  Note: Call light in reach at all times, frequent checks, bed in lowest position, wheels of bed and chair locked, non skid footwear on, appropriate transfer techniques, personal items within reach, walkways free of obstructions, fall risk armband and sign displayed, Tim fall risk score per protocol. No falls this shift, will continue to monitor. Problem: Cardiac Output - Decreased:  Goal: Hemodynamic stability will improve  Description: Hemodynamic stability will improve  Outcome: Ongoing  Note: Pt states that he is not having any chest pain or SOB at this time, foot of bed elevated, will continue to monitor     Problem: Pain:  Goal: Pain level will decrease  Description: Pain level will decrease  Outcome: Ongoing  Note: Pts pain is measured on a 0-10 scale and pain level is a 0     Problem: Tissue Perfusion - Cardiopulmonary, Altered:  Goal: Absence of angina  Description: Absence of angina  Outcome: Ongoing  Note: Pt states that he is free of chest pain and SOB at this time, will continue to monitor.  Pt is AxO X4, telemetry in place and is being monitored

## 2022-03-09 NOTE — PROGRESS NOTES
Discharge instructions provided to patient and wife. All questions answered. IV removed. Telemetry removed. All patients medications returned to patient. Will take out to private transportation once dressed.

## 2022-03-09 NOTE — PROGRESS NOTES
Pts admission assessment and vitals completed, pt denies chest pain and SOB, has no complaints, call light within reach, will continue to monitor.

## 2022-03-09 NOTE — PROGRESS NOTES
Physical Therapy    Facility/Department: [facility] AT THE [facility] MED SURG  Initial Assessment    NAME: George Langford  : 1955  MRN: [id number]    Date of Service: 3/9/2022    Discharge Recommendations:  Continue to assess pending progress,Home with assist PRN        Assessment   Assessment: Pt is a 75 y/o male admitted for unstable angina pectoris. Pt is currently at baseline function in regards to overall functional mobility. Pt is in agreement with not needing PT services at this time. Treatment Diagnosis: generalized weakness  Prognosis: Good  Decision Making: Low Complexity  PT Education: PT Role  No Skilled PT: At baseline function  REQUIRES PT FOLLOW UP: No  Activity Tolerance  Activity Tolerance: Patient Tolerated treatment well       Patient Diagnosis(es): The encounter diagnosis was Unstable angina pectoris ([facility] [address] has a past medical history of CAD (coronary artery disease), History of DVT (deep vein thrombosis), Hyperlipidemia, Hypertension, and Morbid obesity ([facility] [address] has a past surgical history that includes shoulder surgery; knee surgery; Coronary angioplasty with stent; Cardiac catheterization (Left, 2018); Colonoscopy (2019); Colonoscopy (N/A, 2019); Prostate surgery (2021); and Cardiac catheterization (Left, 2022).     Restrictions  Restrictions/Precautions  Restrictions/Precautions: General Precautions  Vision/Hearing  Vision: Impaired  Vision Exceptions: Wears glasses for reading  Hearing: Within functional limits     Subjective  General  Patient assessed for rehabilitation services?: Yes  Pain Screening  Patient Currently in Pain: Denies          Orientation  Orientation  Overall Orientation Status: Within Functional Limits  Social/Functional History  Social/Functional History  Lives With: Spouse  Type of Home: House  Home Layout: One level  Home Access: Stairs to enter with rails  Entrance Stairs - Number of Steps: 4  Bathroom Shower/Tub: Walk-in shower  Bathroom Toilet: Standard  Bathroom Equipment: Tub transfer bench  ADL Assistance: Independent  Homemaking Assistance: Independent  Homemaking Responsibilities: Yes  Ambulation Assistance: Independent  Transfer Assistance: Independent  Active : Yes  Occupation: Retired  Cognition   Cognition  Overall Cognitive Status: WFL    Objective        AROM RLE (degrees)  RLE AROM: WFL  AROM LLE (degrees)  LLE AROM : WFL  Strength RLE  Strength RLE: WFL  Strength LLE  Strength LLE: WFL        Bed mobility  Bridging:  (Pt seated in chair at time of eval)  Transfers  Sit to Stand: Independent  Stand to sit: Independent  Ambulation  Ambulation?: Yes  Ambulation 1  Surface: level tile  Device: No Device  Assistance: Independent  Gait Deviations: None  Distance: 50ft  Stairs/Curb  Stairs?: No     Balance  Sitting - Static: Good  Sitting - Dynamic: Good  Standing - Static: Good  Standing - Dynamic: Good        Plan   Safety Devices  Type of devices:  All fall risk precautions in place    AM-PAC Score     AM-PAC Inpatient Mobility without Stair Climbing Raw Score : 20 (03/09/22 1411)  AM-PAC Inpatient without Stair Climbing T-Scale Score : 60.57 (03/09/22 1411)  Mobility Inpatient CMS 0-100% Score: 0 (03/09/22 1411)  Mobility Inpatient without Stair CMS G-Code Modifier : [facility] (03/09/22 1411)             Therapy Time   Individual Concurrent Group Co-treatment   Time In 1335         Time Out 1350         Minutes 15                 Carlene Jara, MARIA RT

## 2022-03-09 NOTE — PROGRESS NOTES
[facility] heart cath. Sitting up in chair. VS and assessment completed. Pt. Denies any pain. Check operative/puncture site on right wrist, Pulse strong no drainage noted on dressing. Call light within reach. Will continue to monitor.

## 2022-03-09 NOTE — PROGRESS NOTES
Patient: Jose D Plata  : 1955  Date of Visit: 2022    PROGRESS NOTE: Chest Pain (intermittent x1 week, left sided, states had one episode of chest pain this am lasting approx 1 hour which improved after use of Nitro) and Shortness of Breath (worsening over the last year)    I had the pleasure of seeing Jose D Plata on the floor for follow up on his left heart catheterization. We saw him yesterday in the ER for left sided chest pain, nausea, and shortness of breath that has worsened over the past year. Mr. Mari Mijares is a 74 y.o. male with a history of atherosclerotic heart disease. 1-Mr. Mari Mijares has history of coronary artery disease with 3 stents placed back in . Another stent in . With regard to symptoms prior to his PCI's, patient said he usually has epigastric pain and excessive fatigue. No clear history of myocardial infarction. No history of heart failure or significant arrhythmia.     2-He has history of hypertension, dyslipidemia and obstructive sleep apnea ( unable to use CPAP). No clear family history of premature CAD.     3-He is admitted to hospital on 2018 with left-sided chest heaviness. Pain occurred at rest.  No significant radiation. Not associated with any sweating or diaphoresis. Pain relieved in the emergency room after taking 2 sublingual nitroglycerin. 4-S/P Cardiac cath on 2018 95% in stent-restenosis in the proximal LAD with a 95% ostial stenosis in a fairly large D1 branch S/P DESx2 to mid LAD and PTCA-MATTHEW to ostial D1    5-EKG done in office on 2019 showed sinus rhythm with APCs, no acute ischemic changes compared to prior ECGs. 6-Echo on 2020: Normal ejection fraction and wall motion. No significant valvular abnormalities. 7-Echo done today 3/9/2022: Results pending    8-Cardiac Cath done today 3/9/2022: Moderate to severe single vessel disease involving the D1 branch of the   LAD coronary artery.  Normal left ventricular end diastolic pressure. (LVEDP). Mr. Jeffy Joy is resting comfortably in the chair at this time. He states he has been doing well since his heart catheterization. He denies any wrist pain at this time. Overall, he states he is feeling better today. He denies any current chest pain, pressure, or tightness. He denies any lightheaded/dizziness or any palpitations. He denies any problems with current medications. No vomiting. No issues moving his bowls. He denies any blood in his urine or stool. Dr Rolo Ch reviewed catheterization results. Ultimately medication management was recommended at this time. Discussed medication management with Mr. Jeffy Joy and he was in agreement at this time. Past Medical History:   Diagnosis Date    CAD (coronary artery disease)     s/p multiple stents     History of DVT (deep vein thrombosis) 1990    right calf DVT    Hyperlipidemia     Hypertension     Morbid obesity ([address]        CURRENT ALLERGIES: Patient has no known allergies. REVIEW OF SYSTEMS: 14 systems were reviewed. Pertinent positives and negatives as above, all else negative. Past Surgical History:   Procedure Laterality Date    CARDIAC CATHETERIZATION Left 04/17/2018    Right radial/[employer] [facility]/Dr Wren/95% in stent-restenosis in the proximal LAD with a 95% ostial 95% stenosis in a fairly large D1 branch that appears to be jailed.  Normal LVEDP    CARDIAC CATHETERIZATION Left 03/09/2022    diagnostic via right radial approach/ [facility]/ Dr. Hitesh Berman    COLONOSCOPY  04/26/2019    COLONOSCOPY N/A 4/26/2019    COLONOSCOPY POLYPECTOMY SNARE/COLD BIOPSY performed by Edgar Henderson DO at [address] [place] [address]      4 stents placed- 7 total    KNEE SURGERY      PROSTATE SURGERY  07/2021    Prostate biopsy    SHOULDER SURGERY      Social History:  Social History     Tobacco Use    Smoking status: Former Smoker     Packs/day: 0.05     Years: 25.00 Pack years: 1.25     Quit date: 1995     Years since quittin.5    Smokeless tobacco: Never Used    Tobacco comment: quit 20 years ago   Vaping Use    Vaping Use: Never used   Substance Use Topics    Alcohol use: Yes     Comment: occas    Drug use: No        CURRENT MEDICATIONS:  Outpatient Medications Marked as Taking for the 3/8/22 encounter [facility] Encounter)   Medication Sig Dispense Refill    ticagrelor (BRILINTA) 60 MG TABS tablet Take 1 tablet by mouth 2 times daily 180 tablet 3    carvedilol (COREG) 3.125 MG tablet Take 1 tablet by mouth 2 times daily 180 tablet 3    ezetimibe (ZETIA) 10 MG tablet TAKE ONE TABLET BY MOUTH DAILY 90 tablet 3    nitroGLYCERIN (NITROLINGUAL) 0.4 MG/SPRAY 0.4 mg spray Place 2 sprays under the tongue every 5 minutes as needed for Chest pain 1 Bottle 3    lisinopril (PRINIVIL;ZESTRIL) 10 MG tablet Take 10 mg by mouth daily      atorvastatin (LIPITOR) 80 MG tablet Take 80 mg by mouth daily      hydrochlorothiazide (HYDRODIURIL) 25 MG tablet Take 25 mg by mouth daily.  aspirin 81 MG EC tablet Take 81 mg by mouth daily. FAMILY HISTORY: family history includes Kidney Disease in his father. PHYSICAL EXAM:   BP (!) 116/58   Pulse 71   Temp 97.3 °F (36.3 °C) (Temporal)   Resp 16   Ht 5' 9\" (1.753 m)   Wt 282 lb 3 oz (128 kg)   SpO2 96%   BMI 41.67 kg/m²  Body mass index is 41.67 kg/m². Constitutional: He is oriented to person, place, and time. He appears well-developed and well-nourished. In no acute distress. HEENT: Normocephalic and atraumatic. No JVD present. Carotid bruit is not present. No mass and no thyromegaly present. No lymphadenopathy present. Cardiovascular: Normal rate, regular rhythm, normal heart sounds. Exam reveals no gallop and no friction rubs. No heart murmur heard. Pulmonary/Chest: Effort normal and breath sounds normal. No respiratory distress. He has no wheezes, rhonchi or rales. Abdominal: Soft, non-tender. Bowel sounds and aorta are normal. He exhibits no organomegaly, mass or bruit. Extremities: Mild lower extremity edema. 1/2 way up to the knees. No cyanosis and no clubbing. Pulses are 2+ radial and carotid pulses. 2+ dorsalis pedis and posterior tibial pulses bilaterally. Chronic vascular changes. Neurological: He is alert and oriented to person, place, and time. No evidence of gross cranial nerve deficit. Coordination appeared normal.   Skin: Skin is warm and dry. There is no rash or diaphoresis. Psychiatric: He has a normal mood and affect. His speech is normal and behavior is normal.        MOST RECENT LABS ON RECORD:   Lab Results   Component Value Date    WBC 5.3 03/09/2022    HGB 14.3 03/09/2022    HCT 42.7 03/09/2022     03/09/2022    CHOL 116 02/19/2020    TRIG 52 02/19/2020    HDL 50 02/19/2020    ALT 52 (H) 03/08/2022    AST 48 (H) 03/08/2022     03/09/2022    K 3.5 (L) 03/09/2022     03/09/2022    CREATININE 0.66 (L) 03/09/2022    BUN 11 03/09/2022    CO2 29 03/09/2022    TSH 4.32 03/09/2022    PSA 5.39 02/01/2022    INR 1.1 03/09/2022    LABA1C 5.7 02/12/2019       ASSESSMENT:  1. Unstable angina pectoris ([address]       UNSTABLE ANGINA  ASHD  HTN  Hyperlipidemia  Morbid Obesity    PLAN:  · Moderate to Severe D1 branch of the LAD coronary artery: Recommend maximal medical management  ·   · Unstable angina  · Medical Management for Suspected coronary artery disease:   Antiplatelet Agent: Continue aspirin 81 mg daily and Ticagrelor (Brilinta) 60 mg twice daily.  Beta Blocker: Continue Carvedilol (Coreg) 3.125 mg twice daily.  Anti-anginal medications: START isosorbide mononitrate (Imdur) 30 mg once daily. and amlodipine (Norvasc) 5 mg once daily. I also discussed the potential side effects of this medication including lightheadedness and dizziness and instructed them to stop the medication of this occurs and call our office if this occurs.     Cholesterol Reduction Therapy: Continue Atorvastatin (Lipitor) 80 mg daily. Atherosclerotic Heart Disease: S/P Multiple PCIs as outlined in HPI. Antiplatelet Agent: Continue aspirin 81 mg daily and Ticagrelor (Brilinta) 60 mg twice daily. Beta Blocker: Continue carvedilol (Coreg) 3.125 mg. twice daily. Statin Therapy: Continue atorvastatin (Lipitor) 80 mg nightly. And also continue Zetia 10 mg daily. Counseled patient extensively to come to the emergency room with he has persistent chest pain or worsening shortness of breath as this can be life threatening. I reviewed blood work that was done by Dr. Woo Raya MD \" scanned in media\" excellent lipid panel. Hemoglobin A1c 6.0%, kidney function is normal.  Serum potassium is normal.    Essential Hypertension: Controlled  ACE Inibitor/ARB: Continue lisinopril 10 mg once daily. Diuretics: Continue hydrochlorothiazide  25 mg once daily. I told him to watch for any increased lightheadedness or dizziness and call if this develops   Beta Blocker: Continue carvedilol (Coreg)      · Hyperlipidemia: Mixed, LDL done on 3/1/2021 was 64 mg/dL   · Statin Therapy: Continue atorvastatin (Lipitor) 80 mg nightly. Continue Zetia 10 mg daily. Morbid obesity: Body mass index is 41.67 kg/m². I also briefly discussed both diet and exercise strategies for him to continue to loses weight and he was very receptive to this. At this time I do believe he is stable for discharge. We are maximizing his medical management with Imdur 30 mg daily in addition to amlodipine 5 mg daily. We will see him in our office in 2 weeks to re-evaluate his symptoms. We did discuss in detail that if the medications do not control his angina we could consider stenting in the future. I discussed patient's symptoms and treatment plan with Dr Ce Sargent, he was in agreement with the plan and follow up.     Sincerely,  Wiliam Nicholson PA-C  [facility] Cardiology Specialist    [address]  [facility] [place], [place] [address]  Phone: [phone], Fax: [phone]     I believe that the risk of significant morbidity and mortality related to the patient's current medical conditions are: intermediate-high.          March 9, 2022

## 2022-03-09 NOTE — DISCHARGE INSTR - DIET
Good nutrition is important when healing from an illness, injury, or surgery. Follow any nutrition recommendations given to you during your hospital stay. If you were given an oral nutrition supplement while in the hospital, continue to take this supplement at home. You can take it with meals, in-between meals, and/or before bedtime. These supplements can be purchased at most local grocery stores, pharmacies, and chain [employer]-stores. If you have any questions about your diet or nutrition, call the hospital and ask for the dietitian.       Low sodium diet

## 2022-03-09 NOTE — PROGRESS NOTES
Elastoplast loosened, no bleeding/bruising noted. Will continue to monitor. Discharge instructions given to patient regarding cardiac cath, voices understanding.

## 2022-03-09 NOTE — PROGRESS NOTES
Marcha Cabot RN from cath lab called RN and informed that they were on their way to pick patient up for heart cath. RN informed patient and answered all questions.

## 2022-03-09 NOTE — DISCHARGE SUMMARY
Discharge Summary    Yesys Hicks  :  1955  MRN:  [id number]    Admit date:  3/8/2022      Discharge date: 3/9/2022     Admitting Physician:  Julianne Feng MD    Discharge Diagnoses:    Principal Problem:    Chest pain  Active Problems:    CAD (coronary artery disease)    Hyperlipidemia    Hypertension    Unstable angina ([address]  Resolved Problems:    * No resolved hospital problems. *       Hospital Course:   Yessy Hicks is a 74 y.o. male admitted with pain. He presented to the emergency room with complaints of chest discomfort that has been occurring intermittently over the past month. He stated symptoms have been increasing over the past week. Patient described it as achy and tight located substernal area and states that it gets to be moderate to severe intensity. He denied radiation of pain. He did complain of some associated nausea and shortness of breath but no vomiting or diarrhea. He stated he had similar symptoms sometime ago and at that time required stent placement. He does have significant history of CAD and is compliant with medications and maintains good blood pressure control. He does follow with Dr. Jean Liz in cardiology. He has had previous MI as well. He has chronic leg edema from trauma. Patient was admitted and cardiology was consulted and plan was for cardiac catheterization today. Cardiology in [place] Dr. Nasreen Shah reviewed catheterization results which included moderate to severe D1 branch of the LAD coronary artery, and recommended medical management at this time. Patient will start on Imdur as well as Norvasc. He will be discharged home today he will follow-up with cardiology as an outpatient. Patient and wife are both agreeable to this plan of care. Consultants:  Dr. Kai Goldmann, cardiology    Procedures: heart cath:  Moderate to severe D1 branch of LAD coronary artery recommendation maximal medical management    Complications: none    Discharge Condition: fair    Exam:  GEN: Awake, alert and oriented x3. EYES:   EOMI, pupils equal   NECK: Supple. No lymphadenopathy. No carotid bruit  CVS:     irregularly irregular, no audible murmur  PULM:  CTA, no wheezes, rales or rhonchi, no acute respiratory distress  ABD:     Bowels sounds normal.  Abdomen is soft. No distention. no tenderness to palpation. EXT:     no edema bilaterally . No calf tenderness. NEURO: Moves all extremities. Motor and sensory are grossly intact  SKIN:    No rashes. No skin lesions    Significant Diagnostic Studies:   Lab Results   Component Value Date    WBC 5.3 03/09/2022    HGB 14.3 03/09/2022     03/09/2022       Lab Results   Component Value Date    BUN 11 03/09/2022    CREATININE 0.66 (L) 03/09/2022     03/09/2022    K 3.5 (L) 03/09/2022    CALCIUM 9.0 03/09/2022     03/09/2022    CO2 29 03/09/2022    LABGLOM >60 03/09/2022       Lab Results   Component Value Date    WBCUA 0 TO 2 07/08/2021    RBCUA 0 TO 2 07/08/2021    EPITHUA 0 TO 2 07/08/2021    LEUKOCYTESUR NEGATIVE 07/08/2021    SPECGRAV 1.015 07/08/2021    GLUCOSEU NEGATIVE 07/08/2021    KETUA NEGATIVE 07/08/2021    PROTEINU NEGATIVE 07/08/2021    HGBUR NEGATIVE 07/08/2021    CASTUA NOT REPORTED 07/08/2021    CRYSTUA NOT REPORTED 07/08/2021    BACTERIA TRACE (A) 07/08/2021    YEAST NOT REPORTED 07/08/2021       XR CHEST PORTABLE    Result Date: 3/8/2022  EXAMINATION: ONE XRAY VIEW OF THE CHEST 3/8/2022 2:14 pm COMPARISON: 04/15/2018 HISTORY: ORDERING SYSTEM PROVIDED HISTORY: chest pain TECHNOLOGIST PROVIDED HISTORY: chest pain FINDINGS: The cardiomediastinal silhouette is normal. No focal consolidation. The pulmonary vascularity is normal.  There is no pleural effusion or pneumothorax. Osseous structures grossly intact. No acute process.        Assessment and Plan:  Patient Active Problem List    Diagnosis Date Noted    S/P drug eluting coronary stent placement 04/17/2018    Right hip pain 05/28/2021    Diverticulosis of large intestine without hemorrhage 04/26/2019    Unstable angina ([facility] [address] 04/15/2018    Hyperlipidemia     Hypertension     H/O blood clots     Elevated PSA 11/13/2017    Chest pain 07/26/2013    CAD (coronary artery disease) 07/26/2013        Discharge Medications:         Medication List      START taking these medications    amLODIPine 5 MG tablet  Commonly known as: NORVASC  Take 1 tablet by mouth daily  Start taking on: March 10, 2022     isosorbide mononitrate 30 MG extended release tablet  Commonly known as: IMDUR  Take 1 tablet by mouth daily  Start taking on: March 10, 2022        CONTINUE taking these medications    aspirin 81 MG EC tablet     atorvastatin 80 MG tablet  Commonly known as: LIPITOR     carvedilol 3.125 MG tablet  Commonly known as: COREG  Take 1 tablet by mouth 2 times daily     ezetimibe 10 MG tablet  Commonly known as: ZETIA  TAKE ONE TABLET BY MOUTH DAILY     hydroCHLOROthiazide 25 MG tablet  Commonly known as: HYDRODIURIL     lisinopril 10 MG tablet  Commonly known as: PRINIVIL;ZESTRIL     nitroGLYCERIN 0.4 MG/SPRAY 0.4 mg spray  Commonly known as: NITROLINGUAL  Place 2 sprays under the tongue every 5 minutes as needed for Chest pain     ticagrelor 60 MG Tabs tablet  Commonly known as: Brilinta  Take 1 tablet by mouth 2 times daily           Where to Get Your Medications      These medications were sent to [place] [address]    Phone: [phone]   · amLODIPine 5 MG tablet  · isosorbide mononitrate 30 MG extended release tablet         Patient Instructions:    Activity: activity as tolerated  Diet: cardiac diet  Wound Care: none needed  Other: None    Disposition:   Discharge to Home    Follow up:  Patient will be followed by Carly Brooks MD in 1-2 weeks; Dr. Lolis Downing on Discharge (if applicable)  ACE/ARB in CHF: NA  Statin in MI: NA  ASA in MI: NA  Statin in CVA: NA  Antiplatelet in CVA: NA    Total time spent on discharge services: 40 minutes    Including the following activities:  Evaluation and Management of patient  Discussion with patient and/or surrogate about current care plan  Coordination with Case Management and/or   Coordination of care with Consultants (if applicable)   Coordination of care with Receiving Facility Physician (if applicable)  Completion of DME forms (if applicable)  Preparation of Discharge Summary  Preparation of Medication Reconciliation  Preparation of Discharge Prescriptions    Signed:  JENNA Swift CNP, JENNA, NP-C  3/9/2022, 1:55 PM

## 2022-03-09 NOTE — PROGRESS NOTES
Progress Note    SUBJECTIVE:    Patient seen for f/u of Chest pain. He sitting up in chair alert and in no distress with wife at side. Denied complaints. Just completed cath. ROS:   Constitutional: negative  for fevers, and negative for chills. Respiratory: negative for shortness of breath, negative for cough, and negative for wheezing  Cardiovascular: negative for chest pain, and negative for palpitations  Gastrointestinal: negative for abdominal pain, negative for nausea,negative for vomiting, negative for diarrhea, and negative for constipation     All other systems were reviewed with the patient and are negative unless otherwise stated in HPI      OBJECTIVE:      Vitals:   Vitals:    03/09/22 1045   BP: (!) 116/58   Pulse: 71   Resp: 16   Temp: 97.3 °F (36.3 °C)   SpO2: 96%     Weight: 282 lb 3 oz (128 kg)   Height: 5' 9\" (175.3 cm)     Weight  Wt Readings from Last 3 Encounters:   03/09/22 282 lb 3 oz (128 kg)   09/12/21 270 lb (122.5 kg)   08/02/21 271 lb (122.9 kg)     Body mass index is 41.67 kg/m². 24HR INTAKE/OUTPUT:      Intake/Output Summary (Last 24 hours) at 3/9/2022 1145  Last data filed at 3/9/2022 1110  Gross per 24 hour   Intake 1817.02 ml   Output --   Net 1817.02 ml     -----------------------------------------------------------------  Exam:    GEN:    Awake, alert and oriented x3. EYES:  EOMI, pupils equal   NECK: Supple. No lymphadenopathy. No carotid bruit  CVS:    irregularly irregular, no audible murmur  PULM:  CTA, no wheezes, rales or rhonchi, no acute respiratory distress  ABD:    Bowels sounds normal.  Abdomen is soft. No distention. no tenderness to palpation. EXT:   no edema bilaterally . No calf tenderness. NEURO: Moves all extremities. Motor and sensory are grossly intact  SKIN:  No rashes.   No skin lesions.    -----------------------------------------------------------------    Diagnostic Data:      Complete Blood Count:   Recent Labs     03/08/22  1419 03/09/22  0555   WBC 5.1 5.3   RBC 4.81 4.46   HGB 15.3 14.3   HCT 46.2 42.7   MCV 96.0 95.7   MCH 31.8 32.1   MCHC 33.1 33.5   RDW 14.3 14.5*    165   MPV 9.6 9.8        Last 3 Blood Glucose:   Recent Labs     03/08/22  1410 03/09/22  0555   GLUCOSE 109* 107*        Comprehensive Metabolic Profile:   Recent Labs     03/08/22  1410 03/09/22  0555    138   K 4.1 3.5*   CL 96* 100   CO2 27 29   BUN 13 11   CREATININE 0.75 0.66*   GLUCOSE 109* 107*   CALCIUM 9.7 9.0   PROT 7.4  --    LABALBU 4.7  --    BILITOT 1.09  --    ALKPHOS 90  --    AST 48*  --    ALT 52*  --         Urinalysis:   Lab Results   Component Value Date    NITRU NEGATIVE 07/08/2021    COLORU YELLOW 07/08/2021    PHUR 6.5 07/08/2021    WBCUA 0 TO 2 07/08/2021    RBCUA 0 TO 2 07/08/2021    MUCUS NOT REPORTED 07/08/2021    TRICHOMONAS NOT REPORTED 07/08/2021    YEAST NOT REPORTED 07/08/2021    BACTERIA TRACE 07/08/2021    SPECGRAV 1.015 07/08/2021    LEUKOCYTESUR NEGATIVE 07/08/2021    UROBILINOGEN Normal 07/08/2021    BILIRUBINUR NEGATIVE 07/08/2021    BILIRUBINUR NEGATIVE 04/27/2012    GLUCOSEU NEGATIVE 07/08/2021    GLUCOSEU NEGATIVE 04/27/2012    KETUA NEGATIVE 07/08/2021    AMORPHOUS NOT REPORTED 07/08/2021       HgBA1c:    Lab Results   Component Value Date    LABA1C 5.7 02/12/2019       Lactic Acid: No results found for: LACTA     Troponin: No results for input(s): TROPONINI in the last 72 hours. CRP:  No results for input(s): CRP in the last 72 hours. Radiology/Imaging:  XR CHEST PORTABLE   Final Result   No acute process.                ASSESSMENT / PLAN:  Chest pain  · Continue current therapy   · Appreciate Dr. Jt Ware  · Cath today-pending results  · CAD  · Continue ASA, Lipitor, Brilenta  · HTN  · Continue coreg, Zetia, HCTZ, lisinopril  · Nutrition status:   · obesity, non-morbid  · Dietician consult initiated  · Hospital Prophylaxis:   · DVT: Lovenox   · Stress Ulcer: H2 Blocker   · High risk medications: none · Disposition:    · Discharge plan is home today      JENNA Rico - CNP , JENNA, NP-C  Hospitalist Medicine        3/9/2022, 11:45 AM

## 2022-03-09 NOTE — PROGRESS NOTES
Verified Brilinta 60mg tablet with drug ID on [employer] so it can be given tonight.   Medication bottle will be need to be sent down to pharmacy in am.

## 2022-03-09 NOTE — PROGRESS NOTES
Dicussed discharge plans with the patient. Patient is a 74year old male here with Chest Pains. Patient is alert , oriented , and pleasant during our conversation.     Patient is  and lives at home with his wife. He uses no medical equipment. Patient does the cooking and his wife does the cleaning. He is independent with his ADL's. Patient manages his medications and drives. He has no outside services in the home.     His PCP is Dr. Liseth Jaeger MD.   He has medical insurance that helps with medication costs.     The discharge plan is home with no services at this time. Patient does not have advance directives. LSW to monitor and assist with any needs or concerns as they arise.     RYLAN Alvarado

## 2022-03-09 NOTE — PROGRESS NOTES
Spoke with Dr. Monica Lazo to double check about lovenox.  Okay to give lovenox tonight and hold tomorrow AM.

## 2022-03-09 NOTE — H&P
[address], [address]    History & Physical Examination Note              Date:   3/8/2022  Patient name:  Melany Sever  Date of admission:  3/8/2022  1:58 PM  MRN:   [id number]  YOB: 1955    CHIEF COMPLAINT:     Chief Complaint   Patient presents with    Chest Pain     intermittent x1 week, left sided, states had one episode of chest pain this am lasting approx 1 hour which improved after use of Nitro    Shortness of Breath     worsening over the last year     History Obtained From:  Patient and chart review. HPI:     The patient is a 75 y/o male who presented to the ER today with chest discomfort which started intermittetly over the past month, and worse over the past 1 week. The discomfort has resolved. He describes the discomfort as achey and tightness located at the substernal area and rates the pain with moderate-severe intensity. It does not radiate. He also had some associated nausea and shortness of breath. He was feeling strange for some time. He had a similar sensation when he had his previous stents placed and decided to come into the ED to have it evaluated. He has a previous history of known coronary artery disease. He takes all of his medications as prescribed and his BP is under good control at home. He follows closely with Cardiology. Cardiac risk factors include: prior CAD / MI, prior coronary PCI / stent, prior abnormal stress test / heart cath, hypertension, hyperlipidemia and family history of CAD. He has a history of chronic leg swelling (s/p trauma to the right foot, with skin discoloration, unchanged since prior). He states no changes to his diet, and he follows a low salt diet. In the ED, labs, imaging and EKG was obtained. Cardiology had been consulted and the case had been discussed with them.     PAST MEDICAL HISTORY:        has a past medical history of CAD (coronary artery disease), History of DVT (deep vein thrombosis), Hyperlipidemia, Hypertension, and Morbid obesity ([address] PAST SURGICAL HISTORY:      has a past surgical history that includes shoulder surgery; knee surgery; Coronary angioplasty with stent; Cardiac catheterization (Left, 04/17/2018); Colonoscopy (04/26/2019); Colonoscopy (N/A, 4/26/2019); and Prostate surgery (07/2021). FAMILY HISTORY:     family history includes Kidney Disease in his father. HOME MEDICATIONS:     Prior to Admission medications    Medication Sig Start Date End Date Taking? Authorizing Provider   ticagrelor (BRILINTA) 60 MG TABS tablet Take 1 tablet by mouth 2 times daily 5/10/21  Yes Ramiro Nieto MD   carvedilol (COREG) 3.125 MG tablet Take 1 tablet by mouth 2 times daily 5/10/21  Yes Ramiro Nieto MD   ezetimibe (ZETIA) 10 MG tablet TAKE ONE TABLET BY MOUTH DAILY 2/13/19  Yes Ramiro Nieto MD   nitroGLYCERIN (NITROLINGUAL) 0.4 MG/SPRAY 0.4 mg spray Place 2 sprays under the tongue every 5 minutes as needed for Chest pain 5/10/18  Yes Ramiro Nieto MD   lisinopril (PRINIVIL;ZESTRIL) 10 MG tablet Take 10 mg by mouth daily   Yes Historical Provider, MD   atorvastatin (LIPITOR) 80 MG tablet Take 80 mg by mouth daily   Yes Historical Provider, MD   hydrochlorothiazide (HYDRODIURIL) 25 MG tablet Take 25 mg by mouth daily. Yes Historical Provider, MD   aspirin 81 MG EC tablet Take 81 mg by mouth daily. Yes Historical Provider, MD       ALLERGIES:      Patient has no known allergies. SOCIAL HISTORY:      reports that he quit smoking about 26 years ago. He has a 1.25 pack-year smoking history. He has never used smokeless tobacco. He reports current alcohol use. He reports that he does not use drugs. REVIEW OF SYSTEMS:     CONSTITUTIONAL:  no fevers  EYES: negative for double vision  HEENT: No headaches, no rhinorrhea, no nasal congestion, no sore throat, no difficulty swallowing  RESPIRATORY:negative for dyspnea, no wheezing.   CARDIOVASCULAR: positive for chest pain, no palpitations, no fatigue, no edema   GASTROINTESTINAL: some nausea, no vomiting, no change in bowel habits, no abdominal pain   GENITOURINARY: negative for frequency, no dysuria, no nocturia   INTEGUMENT: negative for rash, no easy bruising   HEMATOLOGIC/LYMPHATIC: positive for chronic swelling/edema, unchanged since prior per pt. ALLERGIC/IMMUNOLOGIC: negative for urticaria   ENDOCRINE: no polydipsia, no polyuria, no hot or cold intolerance  MUSCULOSKELETAL: no joint pains, no muscle aches, no swelling of joints or extremities  NEUROLOGICAL: no numbness, no tingling  BEHAVIOR/PSYCH: negative      PHYSICAL EXAM:     Vitals:    03/08/22 1645 03/08/22 1715 03/08/22 1900 03/08/22 1915   BP: (!) 96/57 (!) 113/56 133/68    Pulse: 81 80 88    Resp: 16 25 18    Temp:   97.6 °F (36.4 °C)    TempSrc:   Temporal    SpO2: 96% (!) 81% 96%    Weight:    282 lb 4.8 oz (128.1 kg)   Height:    5' 9\" (1.753 m)       No intake or output data in the 24 hours ending 03/08/22 1937     General Appearance  Alert , awake , oriented x 3, not in acute distress   HEENT - Head is normocephalic, atraumatic.  Eye - no icterus no redness, EOMI   Ear- No ear pain, normal external ear , no discharge   Lungs - Bilateral equal air entry , no wheezes, rales or rhonchi, aeration good   Cardiovascular - Heart sounds are normal.  Regular rhythm, normal rate with murmur. No gallop or rub.  Abdomen - Soft, nontender, nondistended, no masses or organomegaly   Neurologic - There are no new focal motor or sensory deficits, muscle strength 5/5 in all extremities, normal muscle tone and bulk, no abnormal sensation.  Skin - No bruising or bleeding on exposed skin area   Extremities - No cyanosis, clubbing, 2+ edema to both knees bilaterally.    Psych - normal affect     DIAGNOSTICS:      Laboratory Testing:    Recent Results (from the past 24 hour(s))   EKG 12 Lead    Collection Time: 03/08/22  2:03 PM   Result Value Ref Range    Ventricular Rate 87 BPM    Atrial Rate 87 BPM    P-R Interval 162 ms    QRS Duration 128 ms    Q-T Interval 396 ms    QTc Calculation (Bazett) 476 ms    P Axis 45 degrees    R Axis -53 degrees    T Axis 37 degrees   CBC with Auto Differential    Collection Time: 03/08/22  2:10 PM   Result Value Ref Range    WBC 5.1 3.5 - 11.3 k/uL    RBC 4.81 4.21 - 5.77 m/uL    Hemoglobin 15.3 13.0 - 17.0 g/dL    Hematocrit 46.2 40.7 - 50.3 %    MCV 96.0 82.6 - 102.9 fL    MCH 31.8 25.2 - 33.5 pg    MCHC 33.1 28.4 - 34.8 g/dL    RDW 14.3 11.8 - 14.4 %    Platelets 573 136 - 779 k/uL    MPV 9.6 8.1 - 13.5 fL    NRBC Automated 0.0 0.0 per 100 WBC    Seg Neutrophils 64 36 - 65 %    Lymphocytes 24 24 - 43 %    Monocytes 8 3 - 12 %    Eosinophils % 2 1 - 4 %    Basophils 2 0 - 2 %    Immature Granulocytes 0 0 %    Segs Absolute 3.26 1.50 - 8.10 k/uL    Absolute Lymph # 1.25 1.10 - 3.70 k/uL    Absolute Mono # 0.43 0.10 - 1.20 k/uL    Absolute Eos # 0.10 0.00 - 0.44 k/uL    Basophils Absolute 0.08 0.00 - 0.20 k/uL    Absolute Immature Granulocyte <0.03 0.00 - 0.30 k/uL   Comprehensive Metabolic Panel    Collection Time: 03/08/22  2:10 PM   Result Value Ref Range    Glucose 109 (H) 70 - 99 mg/dL    BUN 13 8 - 23 mg/dL    CREATININE 0.75 0.70 - 1.20 mg/dL    Bun/Cre Ratio 17 9 - 20    Calcium 9.7 8.6 - 10.4 mg/dL    Sodium 135 135 - 144 mmol/L    Potassium 4.1 3.7 - 5.3 mmol/L    Chloride 96 (L) 98 - 107 mmol/L    CO2 27 20 - 31 mmol/L    Anion Gap 12 9 - 17 mmol/L    Alkaline Phosphatase 90 40 - 129 U/L    ALT 52 (H) 5 - 41 U/L    AST 48 (H) <40 U/L    Total Bilirubin 1.09 0.3 - 1.2 mg/dL    Total Protein 7.4 6.4 - 8.3 g/dL    Albumin 4.7 3.5 - 5.2 g/dL    Albumin/Globulin Ratio 1.7 1.0 - 2.5    GFR Non-African American >60 >60 mL/min    GFR African American >60 >60 mL/min    GFR Comment          GFR Staging         Troponin    Collection Time: 03/08/22  2:10 PM   Result Value Ref Range    Troponin, High Sensitivity 7 0 - 22 ng/L   Brain Natriuretic Peptide    Collection Time: 03/08/22  2:10 PM   Result Value Ref Range    Pro-BNP 25 <300 pg/mL   Troponin    Collection Time: 03/08/22  3:57 PM   Result Value Ref Range    Troponin, High Sensitivity 7 0 - 22 ng/L     Imaging/Diagonstics:  XR CHEST PORTABLE    Result Date: 3/8/2022  EXAMINATION: ONE XRAY VIEW OF THE CHEST 3/8/2022 2:14 pm COMPARISON: 04/15/2018 HISTORY: ORDERING SYSTEM PROVIDED HISTORY: chest pain TECHNOLOGIST PROVIDED HISTORY: chest pain FINDINGS: The cardiomediastinal silhouette is normal. No focal consolidation. The pulmonary vascularity is normal.  There is no pleural effusion or pneumothorax. Osseous structures grossly intact. No acute process. ASSESSMENT:       Principal Problem:    Chest pain  Active Problems:    CAD (coronary artery disease)    Hyperlipidemia    Hypertension    Unstable angina ([address]  Resolved Problems:    * No resolved hospital problems. *      PLAN:     Patient status: Admit the patient as Observation  · This patient requires overnight observation because of chest pain with known CAD   · Estimated length of stay is 2 days  · Discussed patient's symptoms and data results including labs and imaging studies with the ER at time of admission  · Cardiology consult   · Cardiac Cath per Cardiology planned for tomorrow  · ECHO results are pending  · Monitor serial cardiac enzymes - 7 --> 7  · Monitor CBC/BMP  · Continue ASA per Cardiology; s/p ASA 324mg today  · Continue Brilinta per Cardiology  · [email or web address] for now  · Lovenox for DVT PPx (prior Hx blood clots)  · Fasting lipid panel in AM, resume lipitor 80mg/Zetia  · Check TSH/A1c  · Resumed home doses of HCTZ/Lisinopril  · Given chronic leg edema with no acute changes per pt/spouse and per the patient's preference (risks/benefits/alternatives discussed), we will defer obtaining a VL DUP LE at this time.   · Peptic ulcer prophylaxis: Pepcid  · DVT prophylaxis: Lovenox  · High risk medications: none    Consultations: Consults: IP CONSULT TO CASE MANAGEMENT  PT/OT    Nursing:  Vital signs per unit routine  RT Evaluation & Treat   NPO   Daily weights  Fall precautions  Incentive spirometry  Intake and output   Neurovascular checks  Orthostatic blood pressure  Place intermittent pneumatic compression device  Telemetry monitoring    Above plan discussed with the patient who agreed to the above plan     Please note that this chart was generated using voice recognition Dragon dictation software. Although every effort was made to ensure the accuracy of this automated transcription, some errors in transcription may have occurred.     Dirk Reyes MD  3/8/2022 7:37 PM

## 2022-03-09 NOTE — PROGRESS NOTES
Phone call made to [facility] cath lab requesting Dr. Felisa Boeck review diagnostic cath films.

## 2022-03-09 NOTE — PROGRESS NOTES
Nutrition Assessment     Type and Reason for Visit: Initial,Patient Education    Nutrition Recommendations/Plan:  Heart healthy diet     Nutrition Assessment:  Obesity r/t excess energy intakes relative to expenditure, AEB BMI >40. Post heart cath, hoping to d/c home later. Will reinforce heart healthy diet with patient/spouse. Malnutrition Assessment:  Malnutrition Status: No malnutrition     Nutrition Related Findings: obese      Current Nutrition Therapies:    Diet NPO Exceptions are: Sips of Water with Meds  ADULT DIET;  Regular    Anthropometric Measures:  · Height: 5' 9\" (175.3 cm)  · Current Body Wt: 282 lb 3 oz (128 kg)   · BMI: 41.7    Nutrition Diagnosis:   · Overweight/Obese related to excessive energy intake as evidenced by BMI    Lab Results   Component Value Date     03/09/2022    K 3.5 (L) 03/09/2022     03/09/2022    CO2 29 03/09/2022    BUN 11 03/09/2022    CREATININE 0.66 (L) 03/09/2022    GLUCOSE 107 (H) 03/09/2022    CALCIUM 9.0 03/09/2022    PROT 7.4 03/08/2022    LABALBU 4.7 03/08/2022    BILITOT 1.09 03/08/2022    ALKPHOS 90 03/08/2022    AST 48 (H) 03/08/2022    ALT 52 (H) 03/08/2022    LABGLOM >60 03/09/2022    GFRAA >60 03/09/2022     Lab Results   Component Value Date    LABA1C 5.7 02/12/2019     Lab Results   Component Value Date     02/12/2019     No results found for: VITD25    Nutrition Interventions:   Food and/or Nutrient Delivery:  Start Oral Diet  Nutrition Education/Counseling:  Education initiated   Coordination of Nutrition Care:  Continue to monitor while inpatient    Goals:  PO >75% meals on advanced diet       Nutrition Monitoring and Evaluation:   Behavioral-Environmental Outcomes:  Readiness for Change   Food/Nutrient Intake Outcomes:  Food and Nutrient Intake  Physical Signs/Symptoms Outcomes:  Biochemical Data,Weight     Discharge Planning:    No discharge needs at this time     Electronically signed by Delmi Morales RD, LD on 3/9/22 at 11:54 AM EST    Contact: [phone]

## 2022-03-09 NOTE — PROGRESS NOTES
Occupational Therapy   Occupational Therapy Initial Assessment  Date: 3/9/2022   Patient Name: Wilmar Allison  MRN: [id number]     : 1955    Date of Service: 3/9/2022    Discharge Recommendations:  Home independently       Assessment   Assessment: Pt is a 74 y.o admitted d/t chest pain. During evaluation, Pt demo baseline functioning for ADL's and mobility. Pt would not benefit from additional skilled OT at this time. Treatment Diagnosis: generalized weakness  Prognosis: Fair  Decision Making: Low Complexity  OT Education: OT Role  No Skilled OT: Independent with ADL's;Independent with functional mobility; At baseline function  REQUIRES OT FOLLOW UP: No  Activity Tolerance  Activity Tolerance: Patient Tolerated treatment well  Safety Devices  Safety Devices in place: Yes  Type of devices: All fall risk precautions in place;Call light within reach; Left in chair           Patient Diagnosis(es): The encounter diagnosis was Unstable angina pectoris ([facility] [address] has a past medical history of CAD (coronary artery disease), History of DVT (deep vein thrombosis), Hyperlipidemia, Hypertension, and Morbid obesity ([facility] [address] has a past surgical history that includes shoulder surgery; knee surgery; Coronary angioplasty with stent; Cardiac catheterization (Left, 2018); Colonoscopy (2019); Colonoscopy (N/A, 2019); Prostate surgery (2021); and Cardiac catheterization (Left, 2022). Treatment Diagnosis: generalized weakness      Restrictions       Subjective   General  Chart Reviewed: Yes  Patient assessed for rehabilitation services?: Yes  Family / Caregiver Present: Yes  Referring Practitioner: Henrik  Diagnosis: Chest pain  Subjective  Subjective: Pt denies pain at this time. General Comment  Comments: Pt sitting in bedside chair, agreeable to OT evaluation.     Social/Functional History  Social/Functional History  Lives With: Spouse  Type of Home: House  Home Layout: One level  Home Access: Stairs to enter with rails  Entrance Stairs - Number of Steps: 4  Bathroom Shower/Tub: Walk-in shower  Bathroom Toilet: Standard  Bathroom Equipment: Tub transfer bench  ADL Assistance: Independent  Homemaking Assistance: Independent  Homemaking Responsibilities: Yes  Ambulation Assistance: Independent  Transfer Assistance: Independent  Active : Yes  Occupation: Retired       Objective   Vision: Impaired  Vision Exceptions: Wears glasses for reading  Hearing: Within functional limits          Balance  Sitting Balance: Independent  Standing Balance: Supervision  Functional Mobility  Activity: Other; To/from bathroom  Assist Level: Supervision  Functional Mobility Comments: Pt demo no LOB. ADL  Feeding: Independent  Grooming: Independent  UE Bathing: Modified independent   LE Bathing: Modified independent   UE Dressing: Independent  LE Dressing: Modified independent   Toileting: Independent           Transfers  Sit to stand: Supervision  Stand to sit: Supervision                       LUE AROM (degrees)  LUE AROM : WFL  Left Hand AROM (degrees)  Left Hand AROM: WFL  RUE AROM (degrees)  RUE AROM : WFL  Right Hand AROM (degrees)  Right Hand AROM: WFL  LUE Strength  Gross LUE Strength: WFL  RUE Strength  Gross RUE Strength: WFL                   Plan        G-Code     OutComes Score                                                  AM-PAC Score        AM-[facility] Inpatient Daily Activity Raw Score: 24 (03/09/22 1354)  AM-PAC Inpatient ADL T-Scale Score : 57.54 (03/09/22 1354)  ADL Inpatient CMS 0-100% Score: 0 (03/09/22 1354)  ADL Inpatient CMS G-Code Modifier : CH (03/09/22 [facility])    Goals  Short term goals  Time Frame for Short term goals: 1  Short term goal 1: Pt will complete ADL routine mod I to ensure safe return home.        Therapy Time   Individual Concurrent Group Co-treatment   Time In 1335         Time Out 1350         Minutes [address] [place] [address]

## 2022-03-09 NOTE — PROGRESS NOTES
Call to [facility]. Updated on treatment plan as per consulting interventionalist, will receive medical management. Dr Durant Nose to come up to room and speak to patient later.

## 2022-03-24 ENCOUNTER — OFFICE VISIT (OUTPATIENT)
Dept: CARDIOLOGY | Age: 67
End: 2022-03-24
Payer: MEDICARE

## 2022-03-24 VITALS
HEART RATE: 61 BPM | OXYGEN SATURATION: 96 % | BODY MASS INDEX: 41.98 KG/M2 | HEIGHT: 69 IN | SYSTOLIC BLOOD PRESSURE: 127 MMHG | WEIGHT: 283.4 LBS | DIASTOLIC BLOOD PRESSURE: 81 MMHG | RESPIRATION RATE: 18 BRPM

## 2022-03-24 DIAGNOSIS — E66.01 CLASS 3 SEVERE OBESITY WITH BODY MASS INDEX (BMI) OF 40.0 TO 44.9 IN ADULT, UNSPECIFIED OBESITY TYPE, UNSPECIFIED WHETHER SERIOUS COMORBIDITY PRESENT (HCC): ICD-10-CM

## 2022-03-24 DIAGNOSIS — E78.2 MIXED HYPERLIPIDEMIA: Chronic | ICD-10-CM

## 2022-03-24 DIAGNOSIS — R06.02 SOB (SHORTNESS OF BREATH): Primary | ICD-10-CM

## 2022-03-24 DIAGNOSIS — I10 ESSENTIAL HYPERTENSION: Chronic | ICD-10-CM

## 2022-03-24 DIAGNOSIS — Z95.820 S/P ANGIOPLASTY WITH STENT: ICD-10-CM

## 2022-03-24 DIAGNOSIS — I25.110 CORONARY ARTERY DISEASE INVOLVING NATIVE CORONARY ARTERY OF NATIVE HEART WITH UNSTABLE ANGINA PECTORIS (HCC): Chronic | ICD-10-CM

## 2022-03-24 PROCEDURE — 99214 OFFICE O/P EST MOD 30 MIN: CPT | Performed by: INTERNAL MEDICINE

## 2022-03-24 PROCEDURE — G8417 CALC BMI ABV UP PARAM F/U: HCPCS | Performed by: INTERNAL MEDICINE

## 2022-03-24 PROCEDURE — 93005 ELECTROCARDIOGRAM TRACING: CPT | Performed by: INTERNAL MEDICINE

## 2022-03-24 PROCEDURE — 1036F TOBACCO NON-USER: CPT | Performed by: INTERNAL MEDICINE

## 2022-03-24 PROCEDURE — G8427 DOCREV CUR MEDS BY ELIG CLIN: HCPCS | Performed by: INTERNAL MEDICINE

## 2022-03-24 PROCEDURE — 3017F COLORECTAL CA SCREEN DOC REV: CPT | Performed by: INTERNAL MEDICINE

## 2022-03-24 PROCEDURE — G8484 FLU IMMUNIZE NO ADMIN: HCPCS | Performed by: INTERNAL MEDICINE

## 2022-03-24 PROCEDURE — 1111F DSCHRG MED/CURRENT MED MERGE: CPT | Performed by: INTERNAL MEDICINE

## 2022-03-24 PROCEDURE — 93010 ELECTROCARDIOGRAM REPORT: CPT | Performed by: INTERNAL MEDICINE

## 2022-03-24 PROCEDURE — 1123F ACP DISCUSS/DSCN MKR DOCD: CPT | Performed by: INTERNAL MEDICINE

## 2022-03-24 PROCEDURE — 99211 OFF/OP EST MAY X REQ PHY/QHP: CPT | Performed by: INTERNAL MEDICINE

## 2022-03-24 PROCEDURE — 4040F PNEUMOC VAC/ADMIN/RCVD: CPT | Performed by: INTERNAL MEDICINE

## 2022-03-24 RX ORDER — NITROGLYCERIN 400 UG/1
2 SPRAY ORAL EVERY 5 MIN PRN
Status: CANCELLED | OUTPATIENT
Start: 2022-03-24

## 2022-03-24 RX ORDER — HYDROCHLOROTHIAZIDE 25 MG/1
25 TABLET ORAL DAILY
Qty: 90 TABLET | Refills: 3 | Status: CANCELLED | OUTPATIENT
Start: 2022-03-24

## 2022-03-24 RX ORDER — NITROGLYCERIN 400 UG/1
2 SPRAY ORAL EVERY 5 MIN PRN
Qty: 1 EACH | Refills: 3 | Status: SHIPPED | OUTPATIENT
Start: 2022-03-24

## 2022-03-24 RX ORDER — EZETIMIBE 10 MG/1
10 TABLET ORAL DAILY
Qty: 90 TABLET | Refills: 1 | Status: SHIPPED | OUTPATIENT
Start: 2022-03-24

## 2022-03-24 RX ORDER — CARVEDILOL 3.12 MG/1
3.12 TABLET ORAL 2 TIMES DAILY
Qty: 180 TABLET | Refills: 3 | Status: SHIPPED | OUTPATIENT
Start: 2022-03-24

## 2022-03-24 RX ORDER — FUROSEMIDE 20 MG/1
20 TABLET ORAL DAILY
Qty: 90 TABLET | Refills: 1 | Status: SHIPPED | OUTPATIENT
Start: 2022-03-24 | End: 2022-08-12 | Stop reason: ALTCHOICE

## 2022-03-24 RX ORDER — ATORVASTATIN CALCIUM 80 MG/1
80 TABLET, FILM COATED ORAL DAILY
Qty: 90 TABLET | Refills: 3 | Status: SHIPPED | OUTPATIENT
Start: 2022-03-24

## 2022-03-24 NOTE — PATIENT INSTRUCTIONS
SURVEY:    You may be receiving a survey from [employer] regarding your visit today. Please complete the survey to enable us to provide the highest quality of care to you and your family. If you cannot score us a very good on any question, please call the office to discuss how we could have made your experience a very good one. Thank you.

## 2022-03-24 NOTE — PROGRESS NOTES
Liliana Celis am scribing for and in the presence of Franklin Sandifer, MD, F.A.C.C. Patient: Kelsey Ndiaye  : 1955  Date of Visit: 2022    REASON FOR VISIT / CONSULTATION: Follow-Up from Hospital (HX:CAD,HTN,HLD Pt was admitted 3/8 for SOB and CP had heart cath done he states he is doing ok. SOB with exertion. Denies:CP,lightheaded/dizziness,palp )      Dear Dr. Solange Dai MD    I had the pleasure of seeing Kelsey Ndiaye in my office today. Mr. Mai Duran is a 74 y.o. male with a history of atherosclerotic heart disease. 1-Mr. Mai Duran has history of coronary artery disease with 3 stents placed back in . Another stent in . With regard to symptoms prior to his PCI's, patient said he usually has epigastric pain and excessive fatigue. No clear history of myocardial infarction. No history of heart failure or significant arrhythmia.     2-He has history of hypertension, dyslipidemia and obstructive sleep apnea ( unable to use CPAP). No clear family history of premature CAD.     3-He is admitted to hospital on 2018 with left-sided chest heaviness. Pain occurred at rest.  No significant radiation. Not associated with any sweating or diaphoresis. Pain relieved in the emergency room after taking 2 sublingual nitroglycerin. 4-S/P Cardiac cath on 2018 95% in stent-restenosis in the proximal LAD with a 95% ostial stenosis in a fairly large D1 branch S/P DESx2 to mid LAD and PTCA-MATTHEW to ostial D1    5-EKG done in office on 2019 showed sinus rhythm with APCs, no acute ischemic changes compared to prior ECGs. 6- Echo on 2020: Normal ejection fraction and wall motion. No significant valvular abnormalities. 7- Echo done on 3/8/2022- EF >55% Mild left ventricular hypertrophy and with normal left ventricular cavity size. Unable to assess specific wall motion abnormalities due to image quality.  The left atrium is mildly dilated (29-33) with a left atrial volume index of 33 ml/m2. No significant valvular abnormalities. Evidence of mild diastolic dysfunction is seen. Normal aortic root dimension    8- Heart cath done on 3/8/2022-Moderate to severe single vessel disease involving the D1 branch of the LAD coronary artery. Normal left ventricular end diastolic pressure. (LVEDP). Mr. Cheryl Erazo is here for hospital follow up today. He states that he has been doing good since last visit. He denies any further chest pain he has stable shortness of breath. He denies any chest pain, pressure or tightness. He denies any shortness of breath, lightheaded/dizziness or any palpitations. He denies any problems with current medications. Weight is stable. He stays active by playing golf and doing work around his house. He is golfing twice a week now for his physical activity when weather permits. No nausea or vomiting. No issues moving his bowls. Past Medical History:   Diagnosis Date    CAD (coronary artery disease)     s/p multiple stents     History of DVT (deep vein thrombosis) 1990    right calf DVT    Hyperlipidemia     Hypertension     Morbid obesity ([address]        CURRENT ALLERGIES: Patient has no known allergies. REVIEW OF SYSTEMS: 14 systems were reviewed. Pertinent positives and negatives as above, all else negative. Past Surgical History:   Procedure Laterality Date    CARDIAC CATHETERIZATION Left 04/17/2018    Right radial/[employer]/Dr Wren/95% in stent-restenosis in the proximal LAD with a 95% ostial 95% stenosis in a fairly large D1 branch that appears to be jailed.  Normal LVEDP    CARDIAC CATHETERIZATION Left 03/09/2022    diagnostic via right radial approach/ Valerie Cheung/ Dr. Gian Thornton    COLONOSCOPY  04/26/2019    COLONOSCOPY N/A 4/26/2019    COLONOSCOPY POLYPECTOMY SNARE/COLD BIOPSY performed by Gail Mandujano DO at [address] [place] [address]      4 stents placed- 7 total    KNEE SURGERY      PROSTATE SURGERY 2021    Prostate biopsy    SHOULDER SURGERY      Social History:  Social History     Tobacco Use    Smoking status: Former Smoker     Packs/day: 0.05     Years: 25.00     Pack years: 1.25     Quit date: 1995     Years since quittin.6    Smokeless tobacco: Never Used    Tobacco comment: quit 20 years ago   Vaping Use    Vaping Use: Never used   Substance Use Topics    Alcohol use: Yes     Comment: occas    Drug use: No        CURRENT MEDICATIONS:  Outpatient Medications Marked as Taking for the 3/24/22 encounter (Office Visit) with Nicky Troncoso MD   Medication Sig Dispense Refill    isosorbide mononitrate (IMDUR) 30 MG extended release tablet Take 1 tablet by mouth daily 30 tablet 3    amLODIPine (NORVASC) 5 MG tablet Take 1 tablet by mouth daily 30 tablet 3    ticagrelor (BRILINTA) 60 MG TABS tablet Take 1 tablet by mouth 2 times daily 180 tablet 3    carvedilol (COREG) 3.125 MG tablet Take 1 tablet by mouth 2 times daily 180 tablet 3    ezetimibe (ZETIA) 10 MG tablet TAKE ONE TABLET BY MOUTH DAILY 90 tablet 3    nitroGLYCERIN (NITROLINGUAL) 0.4 MG/SPRAY 0.4 mg spray Place 2 sprays under the tongue every 5 minutes as needed for Chest pain 1 Bottle 3    lisinopril (PRINIVIL;ZESTRIL) 10 MG tablet Take 10 mg by mouth daily      atorvastatin (LIPITOR) 80 MG tablet Take 80 mg by mouth daily      hydrochlorothiazide (HYDRODIURIL) 25 MG tablet Take 25 mg by mouth daily.  aspirin 81 MG EC tablet Take 81 mg by mouth daily. FAMILY HISTORY: family history includes Kidney Disease in his father. PHYSICAL EXAM:   /81 (Site: Left Upper Arm, Position: Sitting, Cuff Size: Large Adult)   Pulse 61   Resp 18   Ht 5' 9\" (1.753 m)   Wt 283 lb 6.4 oz (128.5 kg)   SpO2 96%   BMI 41.85 kg/m²  Body mass index is 41.85 kg/m². Constitutional: He is oriented to person, place, and time. He appears well-developed and well-nourished. In no acute distress.    HEENT: Normocephalic and Disease: S/P Multiple PCIs as outlined in HPI. Antiplatelet Agent: Continue aspirin 81 mg daily and Ticagrelor (Brilinta) 60 mg twice daily. Beta Blocker: Continue carvedilol (Coreg) 3.125 mg. twice daily. Statin Therapy: Continue atorvastatin (Lipitor) 80 mg nightly. And also continue Zetia 10 mg daily. Counseled patient extensively to come to the emergency room with he has persistent chest pain or worsening shortness of breath as this can be life threatening. Essential Hypertension: Controlled  ACE Inibitor/ARB: Continue lisinopril 10 mg once daily. Diuretics: Stop HTCZ and Start Lasix 20 mg daily  hydrochlorothiazide  25 mg once daily. I told him to watch for any increased lightheadedness or dizziness and call if this develops   Beta Blocker: Continue carvedilol (Coreg) 3.125 mg.twice daily. I took the liberty of ordering a BMP in 5-7 days to assess their potassium and renal function. I told them that they could get their lab work performed at the location of their choosing, unfortunately, if the lab work was not performed at a [facility]) facility I could not guarantee my ability to follow up with them on their results. and I ordered a pro BNP level to better assess for the patients level of heart failure. I told them that they could get their lab work performed at the location of their choosing, unfortunately, if the lab work was not performed at a [facility]) facility I could not guarantee my ability to follow up with them on their results. · Hyperlipidemia: Mixed, LDL done on 3/9/2022 was 49 mg/dL   · Statin Therapy: Continue atorvastatin (Lipitor) 80 mg nightly. Continue Zetia 10 mg daily. Morbid obesity: Body mass index is 41.85 kg/m². I also briefly discussed both diet and exercise strategies for him to continue to loses weight and he was very receptive to this.     Finally, I recommended that he continue his other medications and follow up with you as previously scheduled. FOLLOW UP:   I told Mr. [place] Leyva to call my office if he had any problems, but otherwise I asked him to Return in about 3 months (around 6/24/2022). However, I would be happy to see him sooner should the need arise. Sincerely,  Kaylee Nicole MD, F.A.C.C. [facility] Specialist    [address] [place] [address]  Phone: [phone], Fax: [phone]     I believe that the risk of significant morbidity and mortality related to the patient's current medical conditions are: intermediate-high. The documentation recorded by the scribe, accurately and completely reflects the services I personally performed and the decisions made by me. Kaylee Nicole MD, F.A.C.C.  March 24, 2022

## 2022-07-13 ENCOUNTER — HOSPITAL ENCOUNTER (OUTPATIENT)
Age: 67
Discharge: HOME | End: 2022-07-13
Payer: MEDICARE

## 2022-07-13 DIAGNOSIS — R06.02 SOB (SHORTNESS OF BREATH): ICD-10-CM

## 2022-07-13 DIAGNOSIS — I10 ESSENTIAL HYPERTENSION: Chronic | ICD-10-CM

## 2022-07-13 LAB
ANION GAP SERPL CALCULATED.3IONS-SCNC: 13 MMOL/L (ref 9–17)
BUN BLDV-MCNC: 14 MG/DL (ref 8–23)
BUN/CREAT BLD: 18 (ref 9–20)
CALCIUM SERPL-MCNC: 9.6 MG/DL (ref 8.6–10.4)
CHLORIDE BLD-SCNC: 97 MMOL/L (ref 98–107)
CO2: 26 MMOL/L (ref 20–31)
CREAT SERPL-MCNC: 0.78 MG/DL (ref 0.7–1.2)
GFR AFRICAN AMERICAN: >60 ML/MIN
GFR NON-AFRICAN AMERICAN: >60 ML/MIN
GFR SERPL CREATININE-BSD FRML MDRD: ABNORMAL ML/MIN/{1.73_M2}
GFR SERPL CREATININE-BSD FRML MDRD: ABNORMAL ML/MIN/{1.73_M2}
GLUCOSE BLD-MCNC: 114 MG/DL (ref 70–99)
POTASSIUM SERPL-SCNC: 4 MMOL/L (ref 3.7–5.3)
PRO-BNP: 50 PG/ML
SODIUM BLD-SCNC: 136 MMOL/L (ref 135–144)

## 2022-07-13 PROCEDURE — 80048 BASIC METABOLIC PNL TOTAL CA: CPT

## 2022-07-13 PROCEDURE — 83880 ASSAY OF NATRIURETIC PEPTIDE: CPT

## 2022-07-13 PROCEDURE — 36415 COLL VENOUS BLD VENIPUNCTURE: CPT

## 2022-07-15 ENCOUNTER — TELEPHONE (OUTPATIENT)
Dept: CARDIOLOGY | Age: 67
End: 2022-07-15

## 2022-07-15 NOTE — TELEPHONE ENCOUNTER
----- Message from Ran Murphy MD sent at 7/14/2022  4:38 PM EDT -----  Blood work is good. Continue current therapy and follow-up. Please call with questions and/or concerns.   Thank you

## 2022-08-01 ENCOUNTER — HOSPITAL ENCOUNTER (OUTPATIENT)
Age: 67
Discharge: HOME | End: 2022-08-01
Payer: MEDICARE

## 2022-08-01 DIAGNOSIS — R97.20 ELEVATED PSA: ICD-10-CM

## 2022-08-01 LAB — PROSTATE SPECIFIC ANTIGEN: 13.79 NG/ML

## 2022-08-01 PROCEDURE — 36415 COLL VENOUS BLD VENIPUNCTURE: CPT

## 2022-08-01 PROCEDURE — 84153 ASSAY OF PSA TOTAL: CPT

## 2022-08-08 ENCOUNTER — OFFICE VISIT (OUTPATIENT)
Dept: UROLOGY | Age: 67
End: 2022-08-08
Payer: MEDICARE

## 2022-08-08 VITALS
HEIGHT: 69 IN | SYSTOLIC BLOOD PRESSURE: 124 MMHG | WEIGHT: 272 LBS | DIASTOLIC BLOOD PRESSURE: 78 MMHG | BODY MASS INDEX: 40.29 KG/M2 | TEMPERATURE: 97.7 F

## 2022-08-08 DIAGNOSIS — R97.20 ELEVATED PSA: Primary | ICD-10-CM

## 2022-08-08 PROCEDURE — G8427 DOCREV CUR MEDS BY ELIG CLIN: HCPCS | Performed by: UROLOGY

## 2022-08-08 PROCEDURE — 1124F ACP DISCUSS-NO DSCNMKR DOCD: CPT | Performed by: UROLOGY

## 2022-08-08 PROCEDURE — 1036F TOBACCO NON-USER: CPT | Performed by: UROLOGY

## 2022-08-08 PROCEDURE — G8417 CALC BMI ABV UP PARAM F/U: HCPCS | Performed by: UROLOGY

## 2022-08-08 PROCEDURE — 99214 OFFICE O/P EST MOD 30 MIN: CPT | Performed by: UROLOGY

## 2022-08-08 PROCEDURE — 99211 OFF/OP EST MAY X REQ PHY/QHP: CPT | Performed by: UROLOGY

## 2022-08-08 PROCEDURE — 3017F COLORECTAL CA SCREEN DOC REV: CPT | Performed by: UROLOGY

## 2022-08-08 RX ORDER — SULFAMETHOXAZOLE AND TRIMETHOPRIM 800; 160 MG/1; MG/1
1 TABLET ORAL 2 TIMES DAILY
Qty: 20 TABLET | Refills: 0 | Status: SHIPPED | OUTPATIENT
Start: 2022-08-08 | End: 2022-08-18

## 2022-08-08 ASSESSMENT — ENCOUNTER SYMPTOMS
VOMITING: 0
BACK PAIN: 0
COUGH: 0
NAUSEA: 0
SHORTNESS OF BREATH: 0
EYE REDNESS: 0
ABDOMINAL PAIN: 0
WHEEZING: 0
COLOR CHANGE: 0
CONSTIPATION: 0

## 2022-08-08 NOTE — PATIENT INSTRUCTIONS
SURVEY:    You may be receiving a survey from [facility] regarding your visit today. Please complete the survey to enable us to provide the highest quality of care to you and your family. If you cannot score us a very good on any question, please call the office to discuss how we could of made your experience a very good one. Thank you.

## 2022-08-08 NOTE — PROGRESS NOTES
HPI:          Patient is a 69 y.o. male in no acute distress. He is alert and oriented to person, place, and time. History  1/2016 Referred for elevated PSA 5.35. 1 week prior to getting the PSA he fell and broke his tailbone. Has no family history of prostate cancer. 7/2021 -prostate biopsy - all 12 cores negative for malignancy     PSA  8/2022 - 13.79  2/2022 - 5.39  7/2021 - 6.73  5/2021 - 6.33  11/2020 - 4.97  11/2019 - 4.05  8/2018 - 3.83  11/2017 - 3.54  5/2017 - 4.72  3/2017 - 5.70  5/2016 - 3.67  1/2016 - 5.35  9/2013 - 1.71     Currently  Patient is here today for annual visit. Patient did have a recent PSA. Current value is 13.79. This is significantly elevated from 5.39. Patient does report that over the last 6 months he has started using over-the-counter testosterone supplementation. He has used these in the past.  Patient has no current gross hematuria dysuria. He has no flank pain nausea vomiting. He reports no unintentional weight loss or decreased appetite. No pain today. Past Medical History:   Diagnosis Date    CAD (coronary artery disease)     s/p multiple stents     History of DVT (deep vein thrombosis) 1990    right calf DVT    Hyperlipidemia     Hypertension     Morbid obesity [facility])      Past Surgical History:   Procedure Laterality Date    CARDIAC CATHETERIZATION Left 04/17/2018    Right radial/[employer]/Dr Wren/95% in stent-restenosis in the proximal LAD with a 95% ostial 95% stenosis in a fairly large D1 branch that appears to be jailed.  Normal LVEDP    CARDIAC CATHETERIZATION Left 03/09/2022    diagnostic via right radial approach/ Valerie Cheung/ Dr. Roya Elaine    COLONOSCOPY  04/26/2019    COLONOSCOPY N/A 4/26/2019    COLONOSCOPY POLYPECTOMY SNARE/COLD BIOPSY performed by Maria Luisa Pressley DO at [facility]      4 stents placed- 7 total    KNEE SURGERY      PROSTATE SURGERY  07/2021    Prostate biopsy SHOULDER SURGERY       Outpatient Encounter Medications as of 8/8/2022   Medication Sig Dispense Refill    ticagrelor (BRILINTA) 60 MG TABS tablet Take 1 tablet by mouth 2 times daily 180 tablet 3    carvedilol (COREG) 3.125 MG tablet Take 1 tablet by mouth 2 times daily 180 tablet 3    atorvastatin (LIPITOR) 80 MG tablet Take 1 tablet by mouth daily 90 tablet 3    furosemide (LASIX) 20 MG tablet Take 1 tablet by mouth daily 90 tablet 1    ezetimibe (ZETIA) 10 MG tablet Take 1 tablet by mouth daily 90 tablet 1    nitroGLYCERIN (NITROLINGUAL) 0.4 MG/SPRAY 0.4 mg spray Place 2 sprays under the tongue every 5 minutes as needed for Chest pain 1 each 3    isosorbide mononitrate (IMDUR) 30 MG extended release tablet Take 1 tablet by mouth daily 30 tablet 3    amLODIPine (NORVASC) 5 MG tablet Take 1 tablet by mouth daily 30 tablet 3    lisinopril (PRINIVIL;ZESTRIL) 10 MG tablet Take 10 mg by mouth daily      aspirin 81 MG EC tablet Take 81 mg by mouth daily.        Facility-Administered Encounter Medications as of 8/8/2022   Medication Dose Route Frequency Provider Last Rate Last Admin    iopamidol (ISOVUE-370) 76 % injection 90 mL  90 mL IntraVENous ONCE PRN Shaggy Delgado MD          Current Outpatient Medications on File Prior to Visit   Medication Sig Dispense Refill    ticagrelor (BRILINTA) 60 MG TABS tablet Take 1 tablet by mouth 2 times daily 180 tablet 3    carvedilol (COREG) 3.125 MG tablet Take 1 tablet by mouth 2 times daily 180 tablet 3    atorvastatin (LIPITOR) 80 MG tablet Take 1 tablet by mouth daily 90 tablet 3    furosemide (LASIX) 20 MG tablet Take 1 tablet by mouth daily 90 tablet 1    ezetimibe (ZETIA) 10 MG tablet Take 1 tablet by mouth daily 90 tablet 1    nitroGLYCERIN (NITROLINGUAL) 0.4 MG/SPRAY 0.4 mg spray Place 2 sprays under the tongue every 5 minutes as needed for Chest pain 1 each 3    isosorbide mononitrate (IMDUR) 30 MG extended release tablet Take 1 tablet by mouth daily 30 tablet 3 amLODIPine (NORVASC) 5 MG tablet Take 1 tablet by mouth daily 30 tablet 3    lisinopril (PRINIVIL;ZESTRIL) 10 MG tablet Take 10 mg by mouth daily      aspirin 81 MG EC tablet Take 81 mg by mouth daily. Current Facility-Administered Medications on File Prior to Visit   Medication Dose Route Frequency Provider Last Rate Last Admin    iopamidol (ISOVUE-370) 76 % injection 90 mL  90 mL IntraVENous ONCE PRN Mary Anne Marks MD         Patient has no known allergies. Family History   Problem Relation Age of Onset    Kidney Disease Father      Social History     Tobacco Use   Smoking Status Former    Packs/day: 0.05    Years: 25.00    Pack years: 1.25    Types: Cigarettes    Quit date: 1995    Years since quittin.9   Smokeless Tobacco Never   Tobacco Comments    quit 20 years ago       Social History     Substance and Sexual Activity   Alcohol Use Yes    Comment: occas       Review of Systems   Constitutional:  Negative for appetite change, chills and fever. Eyes:  Negative for redness and visual disturbance. Respiratory:  Negative for cough, shortness of breath and wheezing. Cardiovascular:  Negative for chest pain and leg swelling. Gastrointestinal:  Negative for abdominal pain, constipation, nausea and vomiting. Genitourinary:  Negative for decreased urine volume, difficulty urinating, dysuria, enuresis, flank pain, frequency, hematuria, penile discharge, penile pain, scrotal swelling, testicular pain and urgency. Musculoskeletal:  Negative for back pain, joint swelling and myalgias. Skin:  Negative for color change, rash and wound. Neurological:  Negative for dizziness, tremors and numbness. Hematological:  Negative for adenopathy. Does not bruise/bleed easily.      /78 (Site: Left Upper Arm, Position: Sitting, Cuff Size: Large Adult)   Temp 97.7 °F (36.5 °C) (Temporal)   Ht 5' 9\" (1.753 m)   Wt 272 lb (123.4 kg)   BMI 40.17 kg/m²       PHYSICAL EXAM:  Constitutional: Patient in no acute distress; Neuro: alert and oriented to person place and time. Psych: Mood and affect normal.  Skin: Normal  Lungs: Respiratory effort normal  Cardiovascular:  Normal peripheral pulses  Abdomen: Soft, non-tender, non-distended with no CVA, flank pain  Bladder non-tender and not distended. Lymphatics: no palpable lymphadenopathy  Penis normal  Urethral meatus normal  Scrotal exam normal  Testicles normal bilaterally  Epididymis normal bilaterally  No evidence of inguinal hernia    Lab Results   Component Value Date    BUN 14 07/13/2022     Lab Results   Component Value Date    CREATININE 0.78 07/13/2022     Lab Results   Component Value Date    PSA 13.79 (H) 08/01/2022    PSA 5.39 02/01/2022    PSA 6.73 (H) 07/05/2021       ASSESSMENT:  This is a 69 y.o. male with the following diagnoses:   Diagnosis Orders   1. Elevated PSA  sulfamethoxazole-trimethoprim (BACTRIM DS;SEPTRA DS) 800-160 MG per tablet    PSA, Diagnostic           PLAN:  Patient will take 10 days of Bactrim. He will stop all of his over-the-counter supplementation. He will repeat his PSA in 4 weeks and follow-up with us then.

## 2022-08-11 ENCOUNTER — TELEPHONE (OUTPATIENT)
Dept: CARDIAC REHAB | Age: 67
End: 2022-08-11

## 2022-08-11 NOTE — TELEPHONE ENCOUNTER
Dr. Shelly Mcclellan,  Mr. Valencia Madden is scheduled for an OV with you on Friday. He had a cardiac cath on 3/8/2022 that showed some irregularities. Would he benefit from cardiac rehab?   Thanks

## 2022-08-12 ENCOUNTER — OFFICE VISIT (OUTPATIENT)
Dept: CARDIOLOGY | Age: 67
End: 2022-08-12
Payer: MEDICARE

## 2022-08-12 VITALS
DIASTOLIC BLOOD PRESSURE: 73 MMHG | WEIGHT: 277 LBS | RESPIRATION RATE: 18 BRPM | HEART RATE: 85 BPM | OXYGEN SATURATION: 97 % | SYSTOLIC BLOOD PRESSURE: 116 MMHG | BODY MASS INDEX: 40.91 KG/M2

## 2022-08-12 DIAGNOSIS — Z78.9 INTOLERANCE OF CONTINUOUS POSITIVE AIRWAY PRESSURE (CPAP) VENTILATION: ICD-10-CM

## 2022-08-12 DIAGNOSIS — Z95.820 S/P ANGIOPLASTY WITH STENT: ICD-10-CM

## 2022-08-12 DIAGNOSIS — G47.33 OBSTRUCTIVE SLEEP APNEA: ICD-10-CM

## 2022-08-12 DIAGNOSIS — E66.01 CLASS 3 SEVERE OBESITY WITH BODY MASS INDEX (BMI) OF 40.0 TO 44.9 IN ADULT, UNSPECIFIED OBESITY TYPE, UNSPECIFIED WHETHER SERIOUS COMORBIDITY PRESENT (HCC): ICD-10-CM

## 2022-08-12 DIAGNOSIS — I10 ESSENTIAL HYPERTENSION: Primary | ICD-10-CM

## 2022-08-12 DIAGNOSIS — E78.2 MIXED HYPERLIPIDEMIA: ICD-10-CM

## 2022-08-12 PROCEDURE — 1124F ACP DISCUSS-NO DSCNMKR DOCD: CPT | Performed by: INTERNAL MEDICINE

## 2022-08-12 PROCEDURE — G8417 CALC BMI ABV UP PARAM F/U: HCPCS | Performed by: INTERNAL MEDICINE

## 2022-08-12 PROCEDURE — G8427 DOCREV CUR MEDS BY ELIG CLIN: HCPCS | Performed by: INTERNAL MEDICINE

## 2022-08-12 PROCEDURE — 1036F TOBACCO NON-USER: CPT | Performed by: INTERNAL MEDICINE

## 2022-08-12 PROCEDURE — 3017F COLORECTAL CA SCREEN DOC REV: CPT | Performed by: INTERNAL MEDICINE

## 2022-08-12 PROCEDURE — 99214 OFFICE O/P EST MOD 30 MIN: CPT | Performed by: INTERNAL MEDICINE

## 2022-08-12 RX ORDER — FUROSEMIDE 20 MG/1
20 TABLET ORAL DAILY
Qty: 90 TABLET | Refills: 3
Start: 2022-08-12 | End: 2022-09-19

## 2022-08-12 RX ORDER — FUROSEMIDE 20 MG/1
20 TABLET ORAL 2 TIMES DAILY
Qty: 90 TABLET | Refills: 3
Start: 2022-08-12 | End: 2022-08-12

## 2022-08-12 NOTE — PROGRESS NOTES
Kayleen Narayan RN am scribing for and in the presence of Oneyda Nick MD, F.A.C.C. Patient: Augie Ferrara  : 1955  Date of Visit: 2022    REASON FOR VISIT / CONSULTATION: Follow-up (HX: CAD, s/p angioplasty w/ stent, HTN, HLD. Denies CP, SOB, lightheaded/dizziness, palpitations. No concerns. )      Dear Dr. Camden Lester MD    I had the pleasure of seeing Augie Ferrara in my office today. Mr. Lauryn Pritchard is a 69 y.o. male with a history of atherosclerotic heart disease. 1-Mr. Lauryn Pritchard has history of coronary artery disease with 3 stents placed back in . Another stent in . With regard to symptoms prior to his PCI's, patient said he usually has epigastric pain and excessive fatigue. No clear history of myocardial infarction. No history of heart failure or significant arrhythmia. 2-He has history of hypertension, dyslipidemia and obstructive sleep apnea ( unable to use CPAP). No clear family history of premature CAD. 3-He is admitted to hospital on 2018 with left-sided chest heaviness. Pain occurred at rest.  No significant radiation. Not associated with any sweating or diaphoresis. Pain relieved in the emergency room after taking 2 sublingual nitroglycerin. 4-S/P Cardiac cath on 2018 95% in stent-restenosis in the proximal LAD with a 95% ostial stenosis in a fairly large D1 branch S/P DESx2 to mid LAD and PTCA-MATTHEW to ostial D1    5-EKG done in office on 2019 showed sinus rhythm with APCs, no acute ischemic changes compared to prior ECGs. 6- Echo on 2020: Normal ejection fraction and wall motion. No significant valvular abnormalities. 7- Echo done on 3/8/2022- EF >55% Mild left ventricular hypertrophy and with normal left ventricular cavity size. Unable to assess specific wall motion abnormalities due to image quality. The left atrium is mildly dilated (29-33) with a left atrial volume index of 33 ml/m2.  No significant valvular abnormalities. Evidence of mild diastolic dysfunction is seen. Normal aortic root dimension    8- Heart cath done on 3/8/2022-Moderate to severe single vessel disease involving the D1 branch of the LAD coronary artery. Normal left ventricular end diastolic pressure. (LVEDP). Reviewed his last blood work-unremarkable BNP and BMP on 7/2022    Mr. Nicola Leavitt reports doing very well since her last visit. He says he golf's a couple of times a week and does not have any issues with this when the weather permits and says he works around the house a lot as well. He did says he believes that his lasix is making him more dehydration. He denies any chest pain, pressure or tightness. He denies any shortness of breath, lightheaded/dizziness or any palpitations. He denies any problems with current medications. Weight is stable. No nausea or vomiting. No issues moving his bowls. He says he would not mind using the CPAP but needs new supplies for this. Past Medical History:   Diagnosis Date    CAD (coronary artery disease)     s/p multiple stents     History of DVT (deep vein thrombosis) 1990    right calf DVT    Hyperlipidemia     Hypertension     Morbid obesity ([address]        CURRENT ALLERGIES: Patient has no known allergies. REVIEW OF SYSTEMS: 14 systems were reviewed. Pertinent positives and negatives as above, all else negative. Past Surgical History:   Procedure Laterality Date    CARDIAC CATHETERIZATION Left 04/17/2018    Right radial/[employer] [facility]/Dr Wren/95% in stent-restenosis in the proximal LAD with a 95% ostial 95% stenosis in a fairly large D1 branch that appears to be jailed.  Normal LVEDP    CARDIAC CATHETERIZATION Left 03/09/2022    diagnostic via right radial approach/ Valerie Cheung/ Dr. Alfonso Linton    COLONOSCOPY  04/26/2019    COLONOSCOPY N/A 4/26/2019    COLONOSCOPY POLYPECTOMY SNARE/COLD BIOPSY performed by Rodger Grace DO at [facility] 18      4 stents placed- 7 total    KNEE SURGERY      PROSTATE SURGERY  2021    Prostate biopsy    SHOULDER SURGERY      Social History:  Social History     Tobacco Use    Smoking status: Former     Packs/day: 0.05     Years: 25.00     Pack years: 1.25     Types: Cigarettes     Quit date: 1995     Years since quittin.9    Smokeless tobacco: Never    Tobacco comments:     quit 20 years ago   Vaping Use    Vaping Use: Never used   Substance Use Topics    Alcohol use: Yes     Comment: occas    Drug use: No        CURRENT MEDICATIONS:  Outpatient Medications Marked as Taking for the 22 encounter (Office Visit) with Mary Lou Sharp MD   Medication Sig Dispense Refill    sulfamethoxazole-trimethoprim (BACTRIM DS;SEPTRA DS) 800-160 MG per tablet Take 1 tablet by mouth in the morning and 1 tablet before bedtime. Do all this for 10 days. 20 tablet 0    ticagrelor (BRILINTA) 60 MG TABS tablet Take 1 tablet by mouth 2 times daily 180 tablet 3    carvedilol (COREG) 3.125 MG tablet Take 1 tablet by mouth 2 times daily 180 tablet 3    atorvastatin (LIPITOR) 80 MG tablet Take 1 tablet by mouth daily 90 tablet 3    furosemide (LASIX) 20 MG tablet Take 1 tablet by mouth daily 90 tablet 1    ezetimibe (ZETIA) 10 MG tablet Take 1 tablet by mouth daily 90 tablet 1    nitroGLYCERIN (NITROLINGUAL) 0.4 MG/SPRAY 0.4 mg spray Place 2 sprays under the tongue every 5 minutes as needed for Chest pain 1 each 3    isosorbide mononitrate (IMDUR) 30 MG extended release tablet Take 1 tablet by mouth daily 30 tablet 3    amLODIPine (NORVASC) 5 MG tablet Take 1 tablet by mouth daily 30 tablet 3    lisinopril (PRINIVIL;ZESTRIL) 10 MG tablet Take 10 mg by mouth daily      aspirin 81 MG EC tablet Take 81 mg by mouth daily. FAMILY HISTORY: family history includes Kidney Disease in his father. PHYSICAL EXAM:   /73   Pulse 85   Resp 18   Wt 277 lb (125.6 kg)   SpO2 97%   BMI 40.91 kg/m²  Body mass index is 40.91 kg/m². Constitutional: He is oriented to person, place, and time. He appears well-developed and well-nourished. In no acute distress. HEENT: Normocephalic and atraumatic. No JVD present. Carotid bruit is not present. No mass and no thyromegaly present. No lymphadenopathy present. Cardiovascular: Normal rate, regular rhythm, normal heart sounds. Exam reveals no gallop and no friction rubs. No heart murmur heard. Pulmonary/Chest: Effort normal and breath sounds normal. No respiratory distress. He has no wheezes, rhonchi or rales. Abdominal: Soft, non-tender. Bowel sounds and aorta are normal. He exhibits no organomegaly, mass or bruit. Extremities: Mild lower extremity edema. 1/2 way up to the knees. No cyanosis and no clubbing. Pulses are 2+ radial and carotid pulses. 2+ dorsalis pedis and posterior tibial pulses bilaterally. Chronic vascular changes. Neurological: He is alert and oriented to person, place, and time. No evidence of gross cranial nerve deficit. Coordination appeared normal.   Skin: Skin is warm and dry. There is no rash or diaphoresis. Psychiatric: He has a normal mood and affect. His speech is normal and behavior is normal.        MOST RECENT LABS ON RECORD:   Lab Results   Component Value Date    WBC 5.3 03/09/2022    HGB 14.3 03/09/2022    HCT 42.7 03/09/2022     03/09/2022    CHOL 118 03/09/2022    TRIG 153 (H) 03/09/2022    HDL 38 (L) 03/09/2022    ALT 52 (H) 03/08/2022    AST 48 (H) 03/08/2022     07/13/2022    K 4.0 07/13/2022    CL 97 (L) 07/13/2022    CREATININE 0.78 07/13/2022    BUN 14 07/13/2022    CO2 26 07/13/2022    TSH 4.32 03/09/2022    PSA 13.79 (H) 08/01/2022    INR 1.1 03/09/2022    LABA1C 6.1 (H) 03/09/2022       ASSESSMENT:  1. Essential hypertension    2. S/P angioplasty with stent    3. Mixed hyperlipidemia    4.  Class 3 severe obesity with body mass index (BMI) of 40.0 to 44.9 in adult, unspecified obesity type, unspecified whether serious comorbidity present ([facility] [address]    5. Obstructive sleep apnea    6. Intolerance of continuous positive airway pressure (CPAP) ventilation       PLAN:  Atherosclerotic Heart Disease: S/P Multiple PCIs as outlined in HPI. Antiplatelet Agent: Continue aspirin 81 mg daily and Ticagrelor (Brilinta) 60 mg twice daily. Beta Blocker: Continue carvedilol (Coreg) 3.125 mg. twice daily. Statin Therapy: Continue atorvastatin (Lipitor) 80 mg nightly. And also continue Zetia 10 mg daily. Counseled patient extensively to come to the emergency room with he has persistent chest pain or worsening shortness of breath as this can be life threatening. Essential Hypertension: Controlled  ACE Inibitor/ARB: Continue lisinopril 10 mg once daily. Diuretics: Continue Furosemide 20 mg once daily. Beta Blocker: Continue carvedilol (Coreg) 3.125 mg.twice daily. Chronic diastolic heart failure: New York Heart Association Class: IIa (Mild symptoms only in normal activities)  Beta Blocker: Continue Carvedilol (Coreg) 3.125 mg twice daily. ACE Inibitor/ARB: Continue lisinopril 10 mg daily. Diuretics: Continue furosemide (Lasix) 20 mg every morning. We did discuss the possibility of stopping his lasix, however given his lower extremity edema, I do think he will continue to benefit from this medication, therefore for now I asked him to continue this. He was in agreement with. Heart failure counseling: I told them to start wearing lower extremity compression stockings and I advised them to try and keep their legs up whenever possible and to limit salt in their diet. Additional Testing List: I ordered a complete metabolic panel (CMP). I told them that they could get their lab work performed at the location of their choosing, unfortunately, if the lab work was not performed at a [facility]) facility I could not guarantee my ability to follow up with them on their results.        Hyperlipidemia: Mixed, LDL done on 3/9/2022 was 49 mg/dL   Statin Therapy: Continue atorvastatin (Lipitor) 80 mg nightly. Continue Zetia 10 mg daily. Morbid obesity: Body mass index is 40.91 kg/m². I also briefly discussed both diet and exercise strategies for him to continue to loses weight and he was very receptive to this. Finally, I recommended that he continue his other medications and follow up with you as previously scheduled. FOLLOW UP:   I told Mr. Conchis Mitchell to call my office if he had any problems, but otherwise I asked him to Return in about 1 year (around 8/12/2023). However, I would be happy to see him sooner should the need arise. Sincerely,  Anna Chand MD, F.A.C.C. [facility] Specialist    [address] [place] [address] [address] [place] [address]  Phone: [phone], Fax: [phone]     I believe that the risk of significant morbidity and mortality related to the patient's current medical conditions are: intermediate-high. The documentation recorded by the scribe, accurately and completely reflects the services I personally performed and the decisions made by me. Anna Chand MD, F.A.C.C.  August 12, 2022

## 2022-08-12 NOTE — PATIENT INSTRUCTIONS
SURVEY:     You may be receiving a survey from [employer] regarding your visit today. Please complete the survey to enable us to provide the highest quality of care to you and your family. If you cannot score us a very good on any question, please call the office to discuss how we could have made your experience a very good one.      Thank you,

## 2022-09-07 ENCOUNTER — HOSPITAL ENCOUNTER (OUTPATIENT)
Age: 67
Discharge: HOME | End: 2022-09-07
Payer: MEDICARE

## 2022-09-07 DIAGNOSIS — R97.20 ELEVATED PSA: ICD-10-CM

## 2022-09-07 LAB — PROSTATE SPECIFIC ANTIGEN: 8.25 NG/ML

## 2022-09-07 PROCEDURE — 84153 ASSAY OF PSA TOTAL: CPT

## 2022-09-07 PROCEDURE — 36415 COLL VENOUS BLD VENIPUNCTURE: CPT

## 2022-09-12 ENCOUNTER — OFFICE VISIT (OUTPATIENT)
Dept: UROLOGY | Age: 67
End: 2022-09-12
Payer: MEDICARE

## 2022-09-12 VITALS
SYSTOLIC BLOOD PRESSURE: 114 MMHG | HEIGHT: 69 IN | HEART RATE: 62 BPM | DIASTOLIC BLOOD PRESSURE: 64 MMHG | BODY MASS INDEX: 40.88 KG/M2 | WEIGHT: 276 LBS

## 2022-09-12 DIAGNOSIS — R97.20 ELEVATED PSA: Primary | ICD-10-CM

## 2022-09-12 PROCEDURE — G8417 CALC BMI ABV UP PARAM F/U: HCPCS | Performed by: NURSE PRACTITIONER

## 2022-09-12 PROCEDURE — G8427 DOCREV CUR MEDS BY ELIG CLIN: HCPCS | Performed by: NURSE PRACTITIONER

## 2022-09-12 PROCEDURE — 99214 OFFICE O/P EST MOD 30 MIN: CPT | Performed by: NURSE PRACTITIONER

## 2022-09-12 PROCEDURE — 99211 OFF/OP EST MAY X REQ PHY/QHP: CPT

## 2022-09-12 PROCEDURE — 1036F TOBACCO NON-USER: CPT | Performed by: NURSE PRACTITIONER

## 2022-09-12 PROCEDURE — 3017F COLORECTAL CA SCREEN DOC REV: CPT | Performed by: NURSE PRACTITIONER

## 2022-09-12 PROCEDURE — 1124F ACP DISCUSS-NO DSCNMKR DOCD: CPT | Performed by: NURSE PRACTITIONER

## 2022-09-12 ASSESSMENT — ENCOUNTER SYMPTOMS
SHORTNESS OF BREATH: 0
CONSTIPATION: 0
NAUSEA: 0
WHEEZING: 0
VOMITING: 0
EYE REDNESS: 0
COLOR CHANGE: 0
BACK PAIN: 0
ABDOMINAL PAIN: 0
COUGH: 0

## 2022-09-12 NOTE — PROGRESS NOTES
HPI:          Patient is a 69 y.o. male in no acute distress. He is alert and oriented to person, place, and time. History  1/2016 Referred for elevated PSA 5.35. 1 week prior to getting the PSA he fell and broke his tailbone. Has no family history of prostate cancer, breast cancer, or ovarian cancer. 7/2021 prostate biopsy for PSA of 6.73. Pathology: all 12 cores negative for malignancy     PSA  9/2022 - 8.25  8/2022 - 13.79  2/2022 - 5.39  7/2021 - 6.73  5/2021 - 6.33  11/2020 - 4.97  11/2019 - 4.05  8/2018 - 3.83  11/2017 - 3.54  5/2017 - 4.72  3/2017 - 5.70  5/2016 - 3.67  1/2016 - 5.35  9/2013 - 1.71     Today  Here today to follow-up for elevated PSA. At his last visit we did give him antibiotics for a PSA of 13.79. We also had him stop over-the-counter testosterone supplementation. Repeat PSA is 8.25. This has declined, but is elevated compared to his PSA when we biopsied him in 7/2021. He denies unintentional weight loss, decreased energy or appetite, new or worsening bone/hip/back pain. He denies any lower extremity numbness and tingling. He denies new or worsening LUTS. He denies gross hematuria or dysuria. Past Medical History:   Diagnosis Date    CAD (coronary artery disease)     s/p multiple stents     History of DVT (deep vein thrombosis) 1990    right calf DVT    Hyperlipidemia     Hypertension     Morbid obesity [facility])      Past Surgical History:   Procedure Laterality Date    CARDIAC CATHETERIZATION Left 04/17/2018    Right radial/[employer] [facility]/Dr Wren/95% in stent-restenosis in the proximal LAD with a 95% ostial 95% stenosis in a fairly large D1 branch that appears to be jailed.  Normal LVEDP    CARDIAC CATHETERIZATION Left 03/09/2022    diagnostic via right radial approach/ Valerie Cheung/ Dr. Nicolás Quispe    COLONOSCOPY  04/26/2019    COLONOSCOPY N/A 4/26/2019    COLONOSCOPY POLYPECTOMY SNARE/COLD BIOPSY performed by Johnny Fragoso DO at [address] ANGIOPLASTY WITH STENT PLACEMENT      4 stents placed- 7 total    KNEE SURGERY      PROSTATE SURGERY  07/2021    Prostate biopsy    SHOULDER SURGERY       Outpatient Encounter Medications as of 9/12/2022   Medication Sig Dispense Refill    furosemide (LASIX) 20 MG tablet Take 1 tablet by mouth in the morning. 90 tablet 3    ticagrelor (BRILINTA) 60 MG TABS tablet Take 1 tablet by mouth 2 times daily 180 tablet 3    carvedilol (COREG) 3.125 MG tablet Take 1 tablet by mouth 2 times daily 180 tablet 3    atorvastatin (LIPITOR) 80 MG tablet Take 1 tablet by mouth daily 90 tablet 3    ezetimibe (ZETIA) 10 MG tablet Take 1 tablet by mouth daily 90 tablet 1    nitroGLYCERIN (NITROLINGUAL) 0.4 MG/SPRAY 0.4 mg spray Place 2 sprays under the tongue every 5 minutes as needed for Chest pain 1 each 3    isosorbide mononitrate (IMDUR) 30 MG extended release tablet Take 1 tablet by mouth daily 30 tablet 3    amLODIPine (NORVASC) 5 MG tablet Take 1 tablet by mouth daily 30 tablet 3    lisinopril (PRINIVIL;ZESTRIL) 10 MG tablet Take 10 mg by mouth daily      aspirin 81 MG EC tablet Take 81 mg by mouth daily. Facility-Administered Encounter Medications as of 9/12/2022   Medication Dose Route Frequency Provider Last Rate Last Admin    iopamidol (ISOVUE-370) 76 % injection 90 mL  90 mL IntraVENous ONCE PRN Reji Roberson MD          Current Outpatient Medications on File Prior to Visit   Medication Sig Dispense Refill    furosemide (LASIX) 20 MG tablet Take 1 tablet by mouth in the morning.  90 tablet 3    ticagrelor (BRILINTA) 60 MG TABS tablet Take 1 tablet by mouth 2 times daily 180 tablet 3    carvedilol (COREG) 3.125 MG tablet Take 1 tablet by mouth 2 times daily 180 tablet 3    atorvastatin (LIPITOR) 80 MG tablet Take 1 tablet by mouth daily 90 tablet 3    ezetimibe (ZETIA) 10 MG tablet Take 1 tablet by mouth daily 90 tablet 1    nitroGLYCERIN (NITROLINGUAL) 0.4 MG/SPRAY 0.4 mg spray Place 2 sprays under the tongue every 5 minutes as needed for Chest pain 1 each 3    isosorbide mononitrate (IMDUR) 30 MG extended release tablet Take 1 tablet by mouth daily 30 tablet 3    amLODIPine (NORVASC) 5 MG tablet Take 1 tablet by mouth daily 30 tablet 3    lisinopril (PRINIVIL;ZESTRIL) 10 MG tablet Take 10 mg by mouth daily      aspirin 81 MG EC tablet Take 81 mg by mouth daily. Current Facility-Administered Medications on File Prior to Visit   Medication Dose Route Frequency Provider Last Rate Last Admin    iopamidol (ISOVUE-370) 76 % injection 90 mL  90 mL IntraVENous ONCE PRN Severiano Stable, MD         Patient has no known allergies. Family History   Problem Relation Age of Onset    Kidney Disease Father      Social History     Tobacco Use   Smoking Status Former    Packs/day: 0.05    Years: 25.00    Pack years: 1.25    Types: Cigarettes    Quit date: 1995    Years since quittin.0   Smokeless Tobacco Never   Tobacco Comments    quit 20 years ago       Social History     Substance and Sexual Activity   Alcohol Use Yes    Comment: occas       Review of Systems   Constitutional:  Negative for appetite change, chills and fever. Eyes:  Negative for redness and visual disturbance. Respiratory:  Negative for cough, shortness of breath and wheezing. Cardiovascular:  Negative for chest pain and leg swelling. Gastrointestinal:  Negative for abdominal pain, constipation, nausea and vomiting. Genitourinary:  Negative for decreased urine volume, difficulty urinating, dysuria, enuresis, flank pain, frequency, hematuria, penile discharge, penile pain, scrotal swelling, testicular pain and urgency. Musculoskeletal:  Negative for back pain, joint swelling and myalgias. Skin:  Negative for color change, rash and wound. Neurological:  Negative for dizziness, tremors and numbness. Hematological:  Negative for adenopathy. Does not bruise/bleed easily.      /64   Pulse 62   Ht 5' 9\" (1.753 m)   Wt 276 lb (125.2 kg)   BMI 40.76 kg/m²       PHYSICAL EXAM:  Constitutional: Patient in no acute distress; Neuro: alert and oriented to person place and time. Psych: Mood and affect normal.  Skin: Normal  Lungs: Respiratory effort normal  Cardiovascular:  Normal peripheral pulses  Abdomen: Soft, non-tender, non-distended with no CVA, flank pain  Bladder non-tender and not distended. Lab Results   Component Value Date    BUN 14 07/13/2022     Lab Results   Component Value Date    CREATININE 0.78 07/13/2022     Lab Results   Component Value Date    PSA 8.25 (H) 09/07/2022    PSA 13.79 (H) 08/01/2022    PSA 5.39 02/01/2022       ASSESSMENT:   Diagnosis Orders   1. Elevated PSA  PSA, Diagnostic            PLAN:  We discussed repeat prostate biopsy vs a 3 month PSA and Confirm testing of prior prostate samples. Patient is unable to undergo a MRI due to artificial joint replacement. We will proceed with confirm MDX testing on prior prostate tissue samples and we will repeat PSA in 3 months. He does understand that if confirmed testing is abnormal we will need to proceed with a biopsy sooner.

## 2022-09-14 ENCOUNTER — HOSPITAL ENCOUNTER (OUTPATIENT)
Age: 67
Setting detail: SPECIMEN
Discharge: HOME | End: 2022-09-14

## 2022-09-19 RX ORDER — FUROSEMIDE 20 MG/1
TABLET ORAL
Qty: 90 TABLET | Refills: 3 | Status: SHIPPED | OUTPATIENT
Start: 2022-09-19

## 2022-09-20 ENCOUNTER — TELEPHONE (OUTPATIENT)
Dept: UROLOGY | Age: 67
End: 2022-09-20

## 2022-09-20 NOTE — TELEPHONE ENCOUNTER
DNA methylation testing with MdX of prostate tissue was negative. Plan for a PSA and f/u In December as scheduled.

## 2022-09-21 LAB — SURGICAL PATHOLOGY REPORT: NORMAL

## 2022-12-12 ENCOUNTER — HOSPITAL ENCOUNTER (OUTPATIENT)
Age: 67
Discharge: HOME | End: 2022-12-12
Payer: MEDICARE

## 2022-12-12 DIAGNOSIS — R97.20 ELEVATED PSA: ICD-10-CM

## 2022-12-12 PROCEDURE — 84153 ASSAY OF PSA TOTAL: CPT

## 2022-12-12 PROCEDURE — 36415 COLL VENOUS BLD VENIPUNCTURE: CPT

## 2022-12-13 LAB — PROSTATE SPECIFIC ANTIGEN: 6.42 NG/ML

## 2022-12-14 ENCOUNTER — OFFICE VISIT (OUTPATIENT)
Dept: UROLOGY | Age: 67
End: 2022-12-14
Payer: MEDICARE

## 2022-12-14 VITALS
DIASTOLIC BLOOD PRESSURE: 65 MMHG | WEIGHT: 272 LBS | TEMPERATURE: 98 F | SYSTOLIC BLOOD PRESSURE: 115 MMHG | HEART RATE: 64 BPM | HEIGHT: 69 IN | BODY MASS INDEX: 40.29 KG/M2

## 2022-12-14 DIAGNOSIS — N40.1 BPH WITH OBSTRUCTION/LOWER URINARY TRACT SYMPTOMS: ICD-10-CM

## 2022-12-14 DIAGNOSIS — N13.8 BPH WITH OBSTRUCTION/LOWER URINARY TRACT SYMPTOMS: ICD-10-CM

## 2022-12-14 DIAGNOSIS — R97.20 ELEVATED PSA: Primary | ICD-10-CM

## 2022-12-14 PROCEDURE — 99213 OFFICE O/P EST LOW 20 MIN: CPT | Performed by: PHYSICIAN ASSISTANT

## 2022-12-14 PROCEDURE — 1036F TOBACCO NON-USER: CPT | Performed by: PHYSICIAN ASSISTANT

## 2022-12-14 PROCEDURE — 3017F COLORECTAL CA SCREEN DOC REV: CPT | Performed by: PHYSICIAN ASSISTANT

## 2022-12-14 PROCEDURE — 1124F ACP DISCUSS-NO DSCNMKR DOCD: CPT | Performed by: PHYSICIAN ASSISTANT

## 2022-12-14 PROCEDURE — G8484 FLU IMMUNIZE NO ADMIN: HCPCS | Performed by: PHYSICIAN ASSISTANT

## 2022-12-14 PROCEDURE — G8417 CALC BMI ABV UP PARAM F/U: HCPCS | Performed by: PHYSICIAN ASSISTANT

## 2022-12-14 PROCEDURE — 3074F SYST BP LT 130 MM HG: CPT | Performed by: PHYSICIAN ASSISTANT

## 2022-12-14 PROCEDURE — 3078F DIAST BP <80 MM HG: CPT | Performed by: PHYSICIAN ASSISTANT

## 2022-12-14 PROCEDURE — G8427 DOCREV CUR MEDS BY ELIG CLIN: HCPCS | Performed by: PHYSICIAN ASSISTANT

## 2022-12-14 ASSESSMENT — ENCOUNTER SYMPTOMS
VOMITING: 0
WHEEZING: 0
SHORTNESS OF BREATH: 0
BACK PAIN: 0
COUGH: 0
ABDOMINAL PAIN: 0
CONSTIPATION: 0
EYE REDNESS: 0
NAUSEA: 0
COLOR CHANGE: 0

## 2022-12-14 NOTE — PATIENT INSTRUCTIONS
SURVEY:    You may be receiving a survey from [employer] regarding your visit today. Please complete the survey to enable us to provide the highest quality of care to you and your family. If you cannot score us a very good on any question, please call the office to discuss how we could of made your experience a very good one. Thank you.

## 2022-12-14 NOTE — PROGRESS NOTES
HPI:      Patient is a 69 y.o. male in no acute distress. He is alert and oriented to person, place, and time. History  1/2016 Referred for elevated PSA 5.35. 1 week prior to getting the PSA he fell and broke his tailbone. Has no family history of prostate cancer, breast cancer, or ovarian cancer. 7/2021 prostate biopsy for PSA of 6.73. Pathology: all 12 cores negative for malignancy    9/2022 Confirm MDX negative      PSA  12/2022 - 6.42  9/2022 - 8.25  8/2022 - 13.79  2/2022 - 5.39  7/2021 - 6.73  5/2021 - 6.33  11/2020 - 4.97  11/2019 - 4.05  8/2018 - 3.83  11/2017 - 3.54  5/2017 - 4.72  3/2017 - 5.70  5/2016 - 3.67  1/2016 - 5.35  9/2013 - 1.71     Today  Patient is here today to follow-up for elevated PSA. At his last visit we did give him antibiotics for a PSA of 6.42. We also had him stop over-the-counter testosterone supplementation previously. Prior PSA was 8.25. He did have a confirm MDX from his prior prostate biopsy which was methylation negative. He denies unintentional weight loss, decreased energy or appetite, new or worsening bone/hip/back pain. He denies any lower extremity numbness and tingling. He denies new or worsening LUTS. He denies gross hematuria or dysuria. Past Medical History:   Diagnosis Date    CAD (coronary artery disease)     s/p multiple stents     History of DVT (deep vein thrombosis) 1990    right calf DVT    Hyperlipidemia     Hypertension     Morbid obesity [facility])      Past Surgical History:   Procedure Laterality Date    CARDIAC CATHETERIZATION Left 04/17/2018    Right radial/[employer] [facility]/Dr Wren/95% in stent-restenosis in the proximal LAD with a 95% ostial 95% stenosis in a fairly large D1 branch that appears to be jailed.  Normal LVEDP    CARDIAC CATHETERIZATION Left 03/09/2022    diagnostic via right radial approach/ [facility]/ Dr. Kaylee Perkins    COLONOSCOPY  04/26/2019    COLONOSCOPY N/A 4/26/2019    COLONOSCOPY POLYPECTOMY SNARE/COLD BIOPSY performed by Meredith Herbert DO at [facility] 18      4 stents placed- 7 total    KNEE SURGERY      PROSTATE SURGERY  07/2021    Prostate biopsy    SHOULDER SURGERY       Outpatient Encounter Medications as of 12/14/2022   Medication Sig Dispense Refill    furosemide (LASIX) 20 MG tablet TAKE ONE TABLET BY MOUTH DAILY 90 tablet 3    ticagrelor (BRILINTA) 60 MG TABS tablet Take 1 tablet by mouth 2 times daily 180 tablet 3    carvedilol (COREG) 3.125 MG tablet Take 1 tablet by mouth 2 times daily 180 tablet 3    atorvastatin (LIPITOR) 80 MG tablet Take 1 tablet by mouth daily 90 tablet 3    ezetimibe (ZETIA) 10 MG tablet Take 1 tablet by mouth daily 90 tablet 1    nitroGLYCERIN (NITROLINGUAL) 0.4 MG/SPRAY 0.4 mg spray Place 2 sprays under the tongue every 5 minutes as needed for Chest pain 1 each 3    isosorbide mononitrate (IMDUR) 30 MG extended release tablet Take 1 tablet by mouth daily 30 tablet 3    amLODIPine (NORVASC) 5 MG tablet Take 1 tablet by mouth daily 30 tablet 3    lisinopril (PRINIVIL;ZESTRIL) 10 MG tablet Take 10 mg by mouth daily      aspirin 81 MG EC tablet Take 81 mg by mouth daily.        Facility-Administered Encounter Medications as of 12/14/2022   Medication Dose Route Frequency Provider Last Rate Last Admin    iopamidol (ISOVUE-370) 76 % injection 90 mL  90 mL IntraVENous ONCE PRN Chitra Christopher MD          Current Outpatient Medications on File Prior to Visit   Medication Sig Dispense Refill    furosemide (LASIX) 20 MG tablet TAKE ONE TABLET BY MOUTH DAILY 90 tablet 3    ticagrelor (BRILINTA) 60 MG TABS tablet Take 1 tablet by mouth 2 times daily 180 tablet 3    carvedilol (COREG) 3.125 MG tablet Take 1 tablet by mouth 2 times daily 180 tablet 3    atorvastatin (LIPITOR) 80 MG tablet Take 1 tablet by mouth daily 90 tablet 3    ezetimibe (ZETIA) 10 MG tablet Take 1 tablet by mouth daily 90 tablet 1    nitroGLYCERIN (NITROLINGUAL) 0.4 MG/SPRAY 0.4 mg spray Place 2 sprays under the tongue every 5 minutes as needed for Chest pain 1 each 3    isosorbide mononitrate (IMDUR) 30 MG extended release tablet Take 1 tablet by mouth daily 30 tablet 3    amLODIPine (NORVASC) 5 MG tablet Take 1 tablet by mouth daily 30 tablet 3    lisinopril (PRINIVIL;ZESTRIL) 10 MG tablet Take 10 mg by mouth daily      aspirin 81 MG EC tablet Take 81 mg by mouth daily. Current Facility-Administered Medications on File Prior to Visit   Medication Dose Route Frequency Provider Last Rate Last Admin    iopamidol (ISOVUE-370) 76 % injection 90 mL  90 mL IntraVENous ONCE PRN Alexis Houser MD         Patient has no known allergies. Family History   Problem Relation Age of Onset    Kidney Disease Father      Social History     Tobacco Use   Smoking Status Former    Packs/day: 0.05    Years: 25.00    Pack years: 1.25    Types: Cigarettes    Quit date: 1995    Years since quittin.3   Smokeless Tobacco Never   Tobacco Comments    quit 20 years ago       Social History     Substance and Sexual Activity   Alcohol Use Yes    Comment: occas       Review of Systems   Constitutional:  Negative for appetite change, chills and fever. Eyes:  Negative for redness and visual disturbance. Respiratory:  Negative for cough, shortness of breath and wheezing. Cardiovascular:  Negative for chest pain and leg swelling. Gastrointestinal:  Negative for abdominal pain, constipation, nausea and vomiting. Genitourinary:  Negative for decreased urine volume, difficulty urinating, dysuria, enuresis, flank pain, frequency, hematuria, penile discharge, penile pain, scrotal swelling, testicular pain and urgency. Musculoskeletal:  Negative for back pain, joint swelling and myalgias. Skin:  Negative for color change, rash and wound. Neurological:  Negative for dizziness, tremors and numbness. Hematological:  Negative for adenopathy. Does not bruise/bleed easily.      /65 (Site: Left Upper Arm, Position: Sitting, Cuff Size: Large Adult)   Pulse 64   Temp 98 °F (36.7 °C) (Temporal)   Ht 5' 9\" (1.753 m)   Wt 272 lb (123.4 kg)   BMI 40.17 kg/m²       PHYSICAL EXAM:  Constitutional: Patient in no acute distress; Neuro: alert and oriented to person place and time. Psych: Mood and affect normal.  Lungs: Respiratory effort normal  Abdomen: Soft, non-tender, non-distended   Rectal: deferred       Lab Results   Component Value Date    BUN 14 07/13/2022     Lab Results   Component Value Date    CREATININE 0.78 07/13/2022     Lab Results   Component Value Date    PSA 6.42 (H) 12/12/2022    PSA 8.25 (H) 09/07/2022    PSA 13.79 (H) 08/01/2022       ASSESSMENT:   Diagnosis Orders   1. Elevated PSA  PSA, Diagnostic      2.  BPH with obstruction/lower urinary tract symptoms            PLAN:  PSA 6 months

## 2023-05-08 DIAGNOSIS — E66.01 CLASS 3 SEVERE OBESITY WITH BODY MASS INDEX (BMI) OF 40.0 TO 44.9 IN ADULT, UNSPECIFIED OBESITY TYPE, UNSPECIFIED WHETHER SERIOUS COMORBIDITY PRESENT (HCC): ICD-10-CM

## 2023-05-08 DIAGNOSIS — I10 ESSENTIAL HYPERTENSION: Chronic | ICD-10-CM

## 2023-05-08 DIAGNOSIS — E78.2 MIXED HYPERLIPIDEMIA: Chronic | ICD-10-CM

## 2023-05-08 DIAGNOSIS — I25.110 CORONARY ARTERY DISEASE INVOLVING NATIVE CORONARY ARTERY OF NATIVE HEART WITH UNSTABLE ANGINA PECTORIS (HCC): Chronic | ICD-10-CM

## 2023-05-08 RX ORDER — CARVEDILOL 3.12 MG/1
TABLET ORAL
Qty: 180 TABLET | Refills: 3 | Status: SHIPPED | OUTPATIENT
Start: 2023-05-08

## 2023-05-18 DIAGNOSIS — I10 ESSENTIAL HYPERTENSION: Chronic | ICD-10-CM

## 2023-05-18 DIAGNOSIS — E78.2 MIXED HYPERLIPIDEMIA: Chronic | ICD-10-CM

## 2023-05-18 DIAGNOSIS — I25.110 CORONARY ARTERY DISEASE INVOLVING NATIVE CORONARY ARTERY OF NATIVE HEART WITH UNSTABLE ANGINA PECTORIS (HCC): Chronic | ICD-10-CM

## 2023-05-18 DIAGNOSIS — E66.01 CLASS 3 SEVERE OBESITY WITH BODY MASS INDEX (BMI) OF 40.0 TO 44.9 IN ADULT, UNSPECIFIED OBESITY TYPE, UNSPECIFIED WHETHER SERIOUS COMORBIDITY PRESENT (HCC): ICD-10-CM

## 2023-05-18 RX ORDER — TICAGRELOR 60 MG/1
TABLET ORAL
Qty: 180 TABLET | Refills: 3 | Status: SHIPPED | OUTPATIENT
Start: 2023-05-18

## 2023-06-22 ENCOUNTER — HOSPITAL ENCOUNTER (OUTPATIENT)
Age: 68
Discharge: HOME | End: 2023-06-22
Payer: MEDICARE

## 2023-06-22 DIAGNOSIS — R97.20 ELEVATED PSA: ICD-10-CM

## 2023-06-22 PROCEDURE — 36415 COLL VENOUS BLD VENIPUNCTURE: CPT

## 2023-06-22 PROCEDURE — 84153 ASSAY OF PSA TOTAL: CPT

## 2023-06-23 LAB — PSA SERPL-MCNC: 7.54 NG/ML

## 2023-06-30 ENCOUNTER — OFFICE VISIT (OUTPATIENT)
Dept: UROLOGY | Age: 68
End: 2023-06-30

## 2023-06-30 VITALS
WEIGHT: 268 LBS | HEIGHT: 69 IN | DIASTOLIC BLOOD PRESSURE: 72 MMHG | SYSTOLIC BLOOD PRESSURE: 112 MMHG | BODY MASS INDEX: 39.69 KG/M2 | TEMPERATURE: 97.7 F | HEART RATE: 80 BPM

## 2023-06-30 DIAGNOSIS — N13.8 BPH WITH OBSTRUCTION/LOWER URINARY TRACT SYMPTOMS: ICD-10-CM

## 2023-06-30 DIAGNOSIS — R97.20 ELEVATED PSA: Primary | ICD-10-CM

## 2023-06-30 DIAGNOSIS — N40.1 BPH WITH OBSTRUCTION/LOWER URINARY TRACT SYMPTOMS: ICD-10-CM

## 2023-06-30 ASSESSMENT — ENCOUNTER SYMPTOMS
CONSTIPATION: 0
NAUSEA: 0
BACK PAIN: 0
VOMITING: 0
COLOR CHANGE: 0
EYE REDNESS: 0
COUGH: 0
WHEEZING: 0
SHORTNESS OF BREATH: 0
ABDOMINAL PAIN: 0

## 2023-08-21 ENCOUNTER — OFFICE VISIT (OUTPATIENT)
Dept: CARDIOLOGY | Age: 68
End: 2023-08-21
Payer: MEDICARE

## 2023-08-21 ENCOUNTER — TELEPHONE (OUTPATIENT)
Dept: CARDIOLOGY | Age: 68
End: 2023-08-21

## 2023-08-21 ENCOUNTER — TELEPHONE (OUTPATIENT)
Dept: UROLOGY | Age: 68
End: 2023-08-21

## 2023-08-21 ENCOUNTER — HOSPITAL ENCOUNTER (OUTPATIENT)
Age: 68
Discharge: HOME | End: 2023-08-21
Payer: MEDICARE

## 2023-08-21 VITALS
WEIGHT: 265 LBS | HEART RATE: 60 BPM | HEIGHT: 69 IN | RESPIRATION RATE: 18 BRPM | DIASTOLIC BLOOD PRESSURE: 66 MMHG | BODY MASS INDEX: 39.25 KG/M2 | SYSTOLIC BLOOD PRESSURE: 116 MMHG | OXYGEN SATURATION: 97 %

## 2023-08-21 DIAGNOSIS — R06.02 SOB (SHORTNESS OF BREATH): ICD-10-CM

## 2023-08-21 DIAGNOSIS — E66.9 CLASS 2 OBESITY WITH BODY MASS INDEX (BMI) OF 39.0 TO 39.9 IN ADULT, UNSPECIFIED OBESITY TYPE, UNSPECIFIED WHETHER SERIOUS COMORBIDITY PRESENT: ICD-10-CM

## 2023-08-21 DIAGNOSIS — E78.2 MIXED HYPERLIPIDEMIA: Chronic | ICD-10-CM

## 2023-08-21 DIAGNOSIS — I10 ESSENTIAL HYPERTENSION: ICD-10-CM

## 2023-08-21 DIAGNOSIS — I50.32 CHRONIC DIASTOLIC CONGESTIVE HEART FAILURE (HCC): ICD-10-CM

## 2023-08-21 DIAGNOSIS — Z95.5 S/P DRUG ELUTING CORONARY STENT PLACEMENT: ICD-10-CM

## 2023-08-21 DIAGNOSIS — R97.20 ELEVATED PSA: Primary | ICD-10-CM

## 2023-08-21 DIAGNOSIS — I25.10 ASHD (ARTERIOSCLEROTIC HEART DISEASE): ICD-10-CM

## 2023-08-21 DIAGNOSIS — I25.10 ASHD (ARTERIOSCLEROTIC HEART DISEASE): Primary | ICD-10-CM

## 2023-08-21 DIAGNOSIS — R97.20 ELEVATED PSA: ICD-10-CM

## 2023-08-21 LAB
ANION GAP SERPL CALCULATED.3IONS-SCNC: 10 MMOL/L (ref 9–17)
BNP SERPL-MCNC: 78 PG/ML
BUN SERPL-MCNC: 10 MG/DL (ref 8–23)
BUN/CREAT SERPL: 14 (ref 9–20)
CALCIUM SERPL-MCNC: 9.6 MG/DL (ref 8.6–10.4)
CHLORIDE SERPL-SCNC: 100 MMOL/L (ref 98–107)
CHOLEST SERPL-MCNC: 141 MG/DL
CHOLESTEROL/HDL RATIO: 2.5
CO2 SERPL-SCNC: 30 MMOL/L (ref 20–31)
CREAT SERPL-MCNC: 0.7 MG/DL (ref 0.7–1.2)
ERYTHROCYTE [DISTWIDTH] IN BLOOD BY AUTOMATED COUNT: 14.4 % (ref 11.8–14.4)
GFR SERPL CREATININE-BSD FRML MDRD: >60 ML/MIN/1.73M2
GLUCOSE SERPL-MCNC: 119 MG/DL (ref 70–99)
HCT VFR BLD AUTO: 44.8 % (ref 40.7–50.3)
HDLC SERPL-MCNC: 57 MG/DL
HGB BLD-MCNC: 15 G/DL (ref 13–17)
LDLC SERPL CALC-MCNC: 73 MG/DL (ref 0–130)
MCH RBC QN AUTO: 32.8 PG (ref 25.2–33.5)
MCHC RBC AUTO-ENTMCNC: 33.5 G/DL (ref 28.4–34.8)
MCV RBC AUTO: 97.8 FL (ref 82.6–102.9)
NRBC BLD-RTO: 0 PER 100 WBC
PLATELET # BLD AUTO: 165 K/UL (ref 138–453)
PMV BLD AUTO: 10 FL (ref 8.1–13.5)
POTASSIUM SERPL-SCNC: 4.4 MMOL/L (ref 3.7–5.3)
PSA SERPL-MCNC: 7.77 NG/ML
RBC # BLD AUTO: 4.58 M/UL (ref 4.21–5.77)
SODIUM SERPL-SCNC: 140 MMOL/L (ref 135–144)
TRIGL SERPL-MCNC: 53 MG/DL
WBC OTHER # BLD: 4.2 K/UL (ref 3.5–11.3)

## 2023-08-21 PROCEDURE — 80061 LIPID PANEL: CPT

## 2023-08-21 PROCEDURE — 93005 ELECTROCARDIOGRAM TRACING: CPT | Performed by: INTERNAL MEDICINE

## 2023-08-21 PROCEDURE — 1036F TOBACCO NON-USER: CPT | Performed by: INTERNAL MEDICINE

## 2023-08-21 PROCEDURE — 1124F ACP DISCUSS-NO DSCNMKR DOCD: CPT | Performed by: INTERNAL MEDICINE

## 2023-08-21 PROCEDURE — 80048 BASIC METABOLIC PNL TOTAL CA: CPT

## 2023-08-21 PROCEDURE — G8427 DOCREV CUR MEDS BY ELIG CLIN: HCPCS | Performed by: INTERNAL MEDICINE

## 2023-08-21 PROCEDURE — 93010 ELECTROCARDIOGRAM REPORT: CPT | Performed by: INTERNAL MEDICINE

## 2023-08-21 PROCEDURE — 85027 COMPLETE CBC AUTOMATED: CPT

## 2023-08-21 PROCEDURE — 3074F SYST BP LT 130 MM HG: CPT | Performed by: INTERNAL MEDICINE

## 2023-08-21 PROCEDURE — 3078F DIAST BP <80 MM HG: CPT | Performed by: INTERNAL MEDICINE

## 2023-08-21 PROCEDURE — 3017F COLORECTAL CA SCREEN DOC REV: CPT | Performed by: INTERNAL MEDICINE

## 2023-08-21 PROCEDURE — G8417 CALC BMI ABV UP PARAM F/U: HCPCS | Performed by: INTERNAL MEDICINE

## 2023-08-21 PROCEDURE — 99211 OFF/OP EST MAY X REQ PHY/QHP: CPT | Performed by: INTERNAL MEDICINE

## 2023-08-21 PROCEDURE — 83880 ASSAY OF NATRIURETIC PEPTIDE: CPT

## 2023-08-21 PROCEDURE — 99214 OFFICE O/P EST MOD 30 MIN: CPT | Performed by: INTERNAL MEDICINE

## 2023-08-21 PROCEDURE — 84153 ASSAY OF PSA TOTAL: CPT

## 2023-08-21 NOTE — PATIENT INSTRUCTIONS
SURVEY:    You may be receiving a survey from [employer] regarding your visit today. Please complete the survey to enable us to provide the highest quality of care to you and your family. If you cannot score us a very good on any question, please call the office to discuss how we could have made your experience a very good one. Thank you.

## 2023-08-21 NOTE — TELEPHONE ENCOUNTER
Please let him know that the lab released his order for PSA currently. His PSA 2 months ago was 7.54. This is very similar to previous. We will obtain a PSA in December as previously planned, we will call with results.

## 2023-08-21 NOTE — PROGRESS NOTES
Radha Deleon am scribing for and in the presence of Roosevelt Ghosh MD, F.A.C.C..    Patient: Abel Awad  : 1955  Date of Visit: 2023    REASON FOR VISIT / CONSULTATION: Coronary Artery Disease (HX: CAD. Pt states he is doing well. Denies: CP, Palpitations, lightheaded/ dizziness, SOB same. )    Dear Dr. Kathleen Little MD    I had the pleasure of seeing Abel Awad in my office today. Mr. Patrick Sterling is a 77 y.o. male with a history of atherosclerotic heart disease. 1-Mr. Patrick Sterling has history of coronary artery disease with 3 stents placed back in . Another stent in . With regard to symptoms prior to his PCI's, patient said he usually has epigastric pain and excessive fatigue. No clear history of myocardial infarction. No history of heart failure or significant arrhythmia. 2-He has history of hypertension, dyslipidemia and obstructive sleep apnea ( unable to use CPAP). No clear family history of premature CAD. 3-He is admitted to hospital on 2018 with left-sided chest heaviness. Pain occurred at rest.  No significant radiation. Not associated with any sweating or diaphoresis. Pain relieved in the emergency room after taking 2 sublingual nitroglycerin. 4-S/P Cardiac cath on 2018 95% in stent-restenosis in the proximal LAD with a 95% ostial stenosis in a fairly large D1 branch S/P DESx2 to mid LAD and PTCA-MATTHEW to ostial D1    5-EKG done in office on 2019 showed sinus rhythm with APCs, no acute ischemic changes compared to prior ECGs. 6- Echo on 2020: Normal ejection fraction and wall motion. No significant valvular abnormalities. 7- Echo done on 3/8/2022- EF >55% Mild left ventricular hypertrophy and with normal left ventricular cavity size. Unable to assess specific wall motion abnormalities due to image quality. The left atrium is mildly dilated (29-33) with a left atrial volume index of 33 ml/m2.  No significant valvular

## 2023-08-21 NOTE — TELEPHONE ENCOUNTER
----- Message from Greg Cristobal MD sent at 8/21/2023 11:28 AM EDT -----  Metabolic panel and blood count are good. Lipid panel is pending.

## 2023-09-18 RX ORDER — FUROSEMIDE 20 MG/1
TABLET ORAL
Qty: 90 TABLET | Refills: 3 | Status: SHIPPED | OUTPATIENT
Start: 2023-09-18

## 2023-10-06 ENCOUNTER — HOSPITAL ENCOUNTER (OUTPATIENT)
Age: 68
End: 2023-10-06
Attending: INTERNAL MEDICINE
Payer: MEDICARE

## 2023-10-06 VITALS
DIASTOLIC BLOOD PRESSURE: 66 MMHG | SYSTOLIC BLOOD PRESSURE: 116 MMHG | HEIGHT: 69 IN | BODY MASS INDEX: 39.25 KG/M2 | WEIGHT: 265 LBS

## 2023-10-06 DIAGNOSIS — I25.10 ASHD (ARTERIOSCLEROTIC HEART DISEASE): ICD-10-CM

## 2023-10-06 DIAGNOSIS — Z95.5 S/P DRUG ELUTING CORONARY STENT PLACEMENT: ICD-10-CM

## 2023-10-06 DIAGNOSIS — E66.9 CLASS 2 OBESITY WITH BODY MASS INDEX (BMI) OF 39.0 TO 39.9 IN ADULT, UNSPECIFIED OBESITY TYPE, UNSPECIFIED WHETHER SERIOUS COMORBIDITY PRESENT: ICD-10-CM

## 2023-10-06 DIAGNOSIS — R06.02 SOB (SHORTNESS OF BREATH): ICD-10-CM

## 2023-10-06 DIAGNOSIS — E78.2 MIXED HYPERLIPIDEMIA: Chronic | ICD-10-CM

## 2023-10-06 DIAGNOSIS — I10 ESSENTIAL HYPERTENSION: ICD-10-CM

## 2023-10-06 DIAGNOSIS — I50.32 CHRONIC DIASTOLIC CONGESTIVE HEART FAILURE (HCC): ICD-10-CM

## 2023-10-06 LAB
ECHO AO ROOT DIAM: 3.5 CM
ECHO AO ROOT INDEX: 1.5 CM/M2
ECHO AV ACCELERATION TIME: 73.67 MS
ECHO AV CUSP MM: 2.1 CM
ECHO AV MEAN GRADIENT: 3 MMHG
ECHO AV MEAN VELOCITY: 0.8 M/S
ECHO AV PEAK VELOCITY: 1.2 M/S
ECHO AV VTI: 24.6 CM
ECHO BSA: 2.42 M2
ECHO LA DIAMETER INDEX: 2.27 CM/M2
ECHO LA DIAMETER: 5.3 CM
ECHO LA MAJOR AXIS: 6.4 CM
ECHO LA TO AORTIC ROOT RATIO: 1.51
ECHO LA VOL 2C: 50 ML (ref 18–58)
ECHO LA VOL 4C: 57 ML (ref 18–58)
ECHO LA VOL BP: 55 ML (ref 18–58)
ECHO LA VOL/BSA BIPLANE: 24 ML/M2 (ref 16–34)
ECHO LA VOLUME AREA LENGTH: 58 ML
ECHO LA VOLUME INDEX A2C: 21 ML/M2 (ref 16–34)
ECHO LA VOLUME INDEX A4C: 24 ML/M2 (ref 16–34)
ECHO LA VOLUME INDEX AREA LENGTH: 25 ML/M2 (ref 16–34)
ECHO LV E' LATERAL VELOCITY: 12 CM/S
ECHO LV EDV A2C: 93 ML
ECHO LV EDV A4C: 114 ML
ECHO LV EDV BP: 113 ML (ref 67–155)
ECHO LV EDV INDEX A4C: 49 ML/M2
ECHO LV EDV INDEX BP: 48 ML/M2
ECHO LV EDV NDEX A2C: 40 ML/M2
ECHO LV EJECTION FRACTION BIPLANE: 67 % (ref 55–100)
ECHO LV ESV A2C: 35 ML
ECHO LV ESV A4C: 39 ML
ECHO LV ESV BP: 38 ML (ref 22–58)
ECHO LV ESV INDEX A2C: 15 ML/M2
ECHO LV ESV INDEX A4C: 17 ML/M2
ECHO LV ESV INDEX BP: 16 ML/M2
ECHO LV FRACTIONAL SHORTENING: 30 % (ref 28–44)
ECHO LV INTERNAL DIMENSION DIASTOLE INDEX: 2.27 CM/M2
ECHO LV INTERNAL DIMENSION DIASTOLIC: 5.3 CM (ref 4.2–5.9)
ECHO LV INTERNAL DIMENSION SYSTOLIC INDEX: 1.59 CM/M2
ECHO LV INTERNAL DIMENSION SYSTOLIC: 3.7 CM
ECHO LV IVSD: 1.4 CM (ref 0.6–1)
ECHO LV IVSS: 1.6 CM
ECHO LV MASS 2D: 318.9 G (ref 88–224)
ECHO LV MASS INDEX 2D: 136.9 G/M2 (ref 49–115)
ECHO LV POSTERIOR WALL DIASTOLIC: 1.4 CM (ref 0.6–1)
ECHO LV POSTERIOR WALL SYSTOLIC: 1.7 CM
ECHO LV RELATIVE WALL THICKNESS RATIO: 0.53
ECHO MV A VELOCITY: 0.64 M/S
ECHO MV E DECELERATION TIME (DT): 248.6 MS
ECHO MV E VELOCITY: 0.6 M/S
ECHO MV E/A RATIO: 0.94
ECHO MV E/E' LATERAL: 5
ECHO PV MAX VELOCITY: 0.8 M/S

## 2023-10-06 PROCEDURE — 93306 TTE W/DOPPLER COMPLETE: CPT

## 2023-10-06 PROCEDURE — 93306 TTE W/DOPPLER COMPLETE: CPT | Performed by: INTERNAL MEDICINE

## 2023-10-17 ENCOUNTER — TRANSCRIBE ORDERS (OUTPATIENT)
Dept: ADMINISTRATIVE | Age: 68
End: 2023-10-17

## 2023-10-17 DIAGNOSIS — M79.602 LEFT ARM PAIN: Primary | ICD-10-CM

## 2023-10-18 ENCOUNTER — HOSPITAL ENCOUNTER (OUTPATIENT)
Dept: VASCULAR LAB | Age: 68
Discharge: HOME | End: 2023-10-20
Payer: MEDICARE

## 2023-10-18 DIAGNOSIS — M79.602 LEFT ARM PAIN: ICD-10-CM

## 2023-10-18 PROCEDURE — 93971 EXTREMITY STUDY: CPT

## 2023-10-18 PROCEDURE — 93971 EXTREMITY STUDY: CPT | Performed by: SURGERY

## 2024-05-08 DIAGNOSIS — I10 ESSENTIAL HYPERTENSION: Chronic | ICD-10-CM

## 2024-05-08 DIAGNOSIS — E78.2 MIXED HYPERLIPIDEMIA: Chronic | ICD-10-CM

## 2024-05-08 DIAGNOSIS — I25.110 CORONARY ARTERY DISEASE INVOLVING NATIVE CORONARY ARTERY OF NATIVE HEART WITH UNSTABLE ANGINA PECTORIS (HCC): Chronic | ICD-10-CM

## 2024-05-08 DIAGNOSIS — E66.01 CLASS 3 SEVERE OBESITY WITH BODY MASS INDEX (BMI) OF 40.0 TO 44.9 IN ADULT, UNSPECIFIED OBESITY TYPE, UNSPECIFIED WHETHER SERIOUS COMORBIDITY PRESENT (HCC): ICD-10-CM

## 2024-05-08 RX ORDER — CARVEDILOL 3.12 MG/1
TABLET ORAL
Qty: 180 TABLET | Refills: 3 | Status: SHIPPED | OUTPATIENT
Start: 2024-05-08

## 2024-05-28 ENCOUNTER — HOSPITAL ENCOUNTER (EMERGENCY)
Age: 69
Discharge: HOME | End: 2024-05-28
Attending: EMERGENCY MEDICINE
Payer: MEDICARE

## 2024-05-28 VITALS
RESPIRATION RATE: 18 BRPM | HEIGHT: 69 IN | TEMPERATURE: 97.7 F | DIASTOLIC BLOOD PRESSURE: 81 MMHG | SYSTOLIC BLOOD PRESSURE: 111 MMHG | OXYGEN SATURATION: 97 % | WEIGHT: 268 LBS | BODY MASS INDEX: 39.69 KG/M2 | HEART RATE: 65 BPM

## 2024-05-28 DIAGNOSIS — S81.812A SKIN TEAR OF LEFT LOWER LEG WITHOUT COMPLICATION, INITIAL ENCOUNTER: Primary | ICD-10-CM

## 2024-05-28 PROCEDURE — 99284 EMERGENCY DEPT VISIT MOD MDM: CPT

## 2024-05-28 PROCEDURE — 2500000003 HC RX 250 WO HCPCS: Performed by: EMERGENCY MEDICINE

## 2024-05-28 PROCEDURE — 2580000003 HC RX 258: Performed by: EMERGENCY MEDICINE

## 2024-05-28 PROCEDURE — 6370000000 HC RX 637 (ALT 250 FOR IP): Performed by: EMERGENCY MEDICINE

## 2024-05-28 RX ORDER — LIDOCAINE HYDROCHLORIDE AND EPINEPHRINE 10; 10 MG/ML; UG/ML
20 INJECTION, SOLUTION INFILTRATION; PERINEURAL ONCE
Status: DISCONTINUED | OUTPATIENT
Start: 2024-05-28 | End: 2024-05-28 | Stop reason: HOSPADM

## 2024-05-28 RX ADMIN — TRANEXAMIC ACID 1000 MG: 100 INJECTION, SOLUTION INTRAVENOUS at 09:27

## 2024-05-28 RX ADMIN — Medication 3 ML: at 07:39

## 2024-05-28 ASSESSMENT — PAIN SCALES - GENERAL: PAINLEVEL_OUTOF10: 0

## 2024-05-28 NOTE — ED PROVIDER NOTES
dictations but occasionally words are mis-transcribed.)    Indra English MD (electronically signed)  Attending Emergency Physician            Indra English MD  05/28/24 0381

## 2024-07-20 ENCOUNTER — HOSPITAL ENCOUNTER (OUTPATIENT)
Age: 69
Discharge: HOME | End: 2024-07-20
Payer: MEDICARE

## 2024-07-20 DIAGNOSIS — R97.20 ELEVATED PSA: ICD-10-CM

## 2024-07-20 LAB — PSA SERPL-MCNC: 7.6 NG/ML (ref 0–4)

## 2024-07-20 PROCEDURE — 36415 COLL VENOUS BLD VENIPUNCTURE: CPT

## 2024-07-20 PROCEDURE — 84153 ASSAY OF PSA TOTAL: CPT

## 2024-07-22 ENCOUNTER — OFFICE VISIT (OUTPATIENT)
Dept: UROLOGY | Age: 69
End: 2024-07-22
Payer: MEDICARE

## 2024-07-22 VITALS
SYSTOLIC BLOOD PRESSURE: 116 MMHG | DIASTOLIC BLOOD PRESSURE: 76 MMHG | BODY MASS INDEX: 38.51 KG/M2 | WEIGHT: 260 LBS | TEMPERATURE: 97.5 F | HEART RATE: 51 BPM | HEIGHT: 69 IN

## 2024-07-22 DIAGNOSIS — R97.20 ELEVATED PSA: Primary | ICD-10-CM

## 2024-07-22 PROCEDURE — G8427 DOCREV CUR MEDS BY ELIG CLIN: HCPCS | Performed by: PHYSICIAN ASSISTANT

## 2024-07-22 PROCEDURE — 99213 OFFICE O/P EST LOW 20 MIN: CPT | Performed by: PHYSICIAN ASSISTANT

## 2024-07-22 PROCEDURE — 3078F DIAST BP <80 MM HG: CPT | Performed by: PHYSICIAN ASSISTANT

## 2024-07-22 PROCEDURE — G8417 CALC BMI ABV UP PARAM F/U: HCPCS | Performed by: PHYSICIAN ASSISTANT

## 2024-07-22 PROCEDURE — 1036F TOBACCO NON-USER: CPT | Performed by: PHYSICIAN ASSISTANT

## 2024-07-22 PROCEDURE — 1124F ACP DISCUSS-NO DSCNMKR DOCD: CPT | Performed by: PHYSICIAN ASSISTANT

## 2024-07-22 PROCEDURE — 3017F COLORECTAL CA SCREEN DOC REV: CPT | Performed by: PHYSICIAN ASSISTANT

## 2024-07-22 PROCEDURE — 3074F SYST BP LT 130 MM HG: CPT | Performed by: PHYSICIAN ASSISTANT

## 2024-07-22 ASSESSMENT — ENCOUNTER SYMPTOMS
NAUSEA: 0
COLOR CHANGE: 0
EYE REDNESS: 0
SHORTNESS OF BREATH: 0
BACK PAIN: 0
WHEEZING: 0
COUGH: 0
VOMITING: 0
CONSTIPATION: 0
ABDOMINAL PAIN: 0

## 2024-07-22 NOTE — PROGRESS NOTES
HPI:      Patient is a 69 y.o. male in no acute distress.  He is alert and oriented to person, place, and time.       History  1/2016 Referred for elevated PSA 5.35. 1 week prior to getting the PSA he fell and broke his tailbone. Has no family history of prostate cancer, breast cancer, or ovarian cancer.     7/2021 prostate biopsy for PSA of 6.73. Pathology: all 12 cores negative for malignancy     9/2022 Confirm MDX negative      PSA  7/2024 - 7.6  8/2023 - 7.77  6/2023 - 7.54  12/2022 - 6.42  9/2022 - 8.25  8/2022 - 13.79  2/2022 - 5.39  7/2021 - 6.73  5/2021 - 6.33  11/2020 - 4.97  11/2019 - 4.05  8/2018 - 3.83  11/2017 - 3.54  5/2017 - 4.72  3/2017 - 5.70  5/2016 - 3.67  1/2016 - 5.35  9/2013 - 1.71     Today  Patient is here today for follow-up elevated PSA.  Patient did have a PSA prior to visit today which was 7.6.  This is very similar to approximately year ago which was 7.77.  Patient's highest PSA in the past was 13.79.  Patient did have a prostate biopsy approximately 3 years ago for a PSA of 6.73.  Confirm MDX was also negative.  Patient is over the age of 68. He denies unintentional weight loss, decreased energy or appetite, new or worsening bone/hip/back pain. He denies any lower extremity numbness and tingling.  He denies gross hematuria or dysuria.  Patient does take a diuretic in the morning which does lead to some urgency and frequency.  This does slow down in the afternoon.  He has a daily bowel movement.  He denies any fever, chills, gross hematuria, flank pain, dysuria.      Past Medical History:   Diagnosis Date    CAD (coronary artery disease)     s/p multiple stents     History of DVT (deep vein thrombosis) 1990    right calf DVT    Hyperlipidemia     Hypertension     Morbid obesity (HCC)      Past Surgical History:   Procedure Laterality Date    CARDIAC CATHETERIZATION Left 04/17/2018    Right radial/[employer] [facility]/Dr Wren/95% in stent-restenosis in the proximal LAD with a 95% ostial

## 2024-08-21 ENCOUNTER — OFFICE VISIT (OUTPATIENT)
Dept: CARDIOLOGY | Age: 69
End: 2024-08-21
Payer: MEDICARE

## 2024-08-21 ENCOUNTER — HOSPITAL ENCOUNTER (OUTPATIENT)
Age: 69
Discharge: HOME | End: 2024-08-21
Payer: MEDICARE

## 2024-08-21 VITALS
HEART RATE: 64 BPM | WEIGHT: 256.6 LBS | SYSTOLIC BLOOD PRESSURE: 113 MMHG | BODY MASS INDEX: 38 KG/M2 | RESPIRATION RATE: 18 BRPM | HEIGHT: 69 IN | OXYGEN SATURATION: 98 % | DIASTOLIC BLOOD PRESSURE: 67 MMHG

## 2024-08-21 DIAGNOSIS — E66.9 CLASS 2 OBESITY WITH BODY MASS INDEX (BMI) OF 37.0 TO 37.9 IN ADULT, UNSPECIFIED OBESITY TYPE, UNSPECIFIED WHETHER SERIOUS COMORBIDITY PRESENT: ICD-10-CM

## 2024-08-21 DIAGNOSIS — E78.2 MIXED HYPERLIPIDEMIA: Chronic | ICD-10-CM

## 2024-08-21 DIAGNOSIS — Z95.820 S/P ANGIOPLASTY WITH STENT: ICD-10-CM

## 2024-08-21 DIAGNOSIS — I10 ESSENTIAL HYPERTENSION: Chronic | ICD-10-CM

## 2024-08-21 DIAGNOSIS — I25.110 CORONARY ARTERY DISEASE INVOLVING NATIVE CORONARY ARTERY OF NATIVE HEART WITH UNSTABLE ANGINA PECTORIS (HCC): Primary | Chronic | ICD-10-CM

## 2024-08-21 DIAGNOSIS — I25.110 CORONARY ARTERY DISEASE INVOLVING NATIVE CORONARY ARTERY OF NATIVE HEART WITH UNSTABLE ANGINA PECTORIS (HCC): Chronic | ICD-10-CM

## 2024-08-21 LAB
CHOLEST SERPL-MCNC: 156 MG/DL (ref 0–199)
CHOLESTEROL/HDL RATIO: 3
ERYTHROCYTE [DISTWIDTH] IN BLOOD BY AUTOMATED COUNT: 15 % (ref 11.8–14.4)
HCT VFR BLD AUTO: 44 % (ref 40.7–50.3)
HDLC SERPL-MCNC: 51 MG/DL
HGB BLD-MCNC: 14.9 G/DL (ref 13–17)
LDLC SERPL CALC-MCNC: 65 MG/DL (ref 0–100)
MCH RBC QN AUTO: 32.3 PG (ref 25.2–33.5)
MCHC RBC AUTO-ENTMCNC: 33.9 G/DL (ref 28.4–34.8)
MCV RBC AUTO: 95.4 FL (ref 82.6–102.9)
NRBC BLD-RTO: 0 PER 100 WBC
PLATELET # BLD AUTO: 190 K/UL (ref 138–453)
PMV BLD AUTO: 9.6 FL (ref 8.1–13.5)
RBC # BLD AUTO: 4.61 M/UL (ref 4.21–5.77)
TRIGL SERPL-MCNC: 202 MG/DL
VLDLC SERPL CALC-MCNC: 40 MG/DL
WBC OTHER # BLD: 4.6 K/UL (ref 3.5–11.3)

## 2024-08-21 PROCEDURE — 3017F COLORECTAL CA SCREEN DOC REV: CPT | Performed by: INTERNAL MEDICINE

## 2024-08-21 PROCEDURE — 1124F ACP DISCUSS-NO DSCNMKR DOCD: CPT | Performed by: INTERNAL MEDICINE

## 2024-08-21 PROCEDURE — 99211 OFF/OP EST MAY X REQ PHY/QHP: CPT | Performed by: INTERNAL MEDICINE

## 2024-08-21 PROCEDURE — 36415 COLL VENOUS BLD VENIPUNCTURE: CPT

## 2024-08-21 PROCEDURE — 3074F SYST BP LT 130 MM HG: CPT | Performed by: INTERNAL MEDICINE

## 2024-08-21 PROCEDURE — 93005 ELECTROCARDIOGRAM TRACING: CPT | Performed by: INTERNAL MEDICINE

## 2024-08-21 PROCEDURE — 1036F TOBACCO NON-USER: CPT | Performed by: INTERNAL MEDICINE

## 2024-08-21 PROCEDURE — 85027 COMPLETE CBC AUTOMATED: CPT

## 2024-08-21 PROCEDURE — 99214 OFFICE O/P EST MOD 30 MIN: CPT | Performed by: INTERNAL MEDICINE

## 2024-08-21 PROCEDURE — 3078F DIAST BP <80 MM HG: CPT | Performed by: INTERNAL MEDICINE

## 2024-08-21 PROCEDURE — G8427 DOCREV CUR MEDS BY ELIG CLIN: HCPCS | Performed by: INTERNAL MEDICINE

## 2024-08-21 PROCEDURE — 93010 ELECTROCARDIOGRAM REPORT: CPT | Performed by: INTERNAL MEDICINE

## 2024-08-21 PROCEDURE — G8417 CALC BMI ABV UP PARAM F/U: HCPCS | Performed by: INTERNAL MEDICINE

## 2024-08-21 PROCEDURE — 80061 LIPID PANEL: CPT

## 2024-08-21 RX ORDER — NITROGLYCERIN 400 UG/1
2 SPRAY ORAL EVERY 5 MIN PRN
Qty: 1 EACH | Refills: 3 | Status: SHIPPED | OUTPATIENT
Start: 2024-08-21

## 2024-08-21 NOTE — PROGRESS NOTES
and completely reflects the services I personally performed and the decisions made by me. Michael Santamaria MD, F.A.C.C. August 21, 2024

## 2024-08-26 ENCOUNTER — TELEPHONE (OUTPATIENT)
Dept: CARDIOLOGY | Age: 69
End: 2024-08-26

## 2024-08-26 NOTE — TELEPHONE ENCOUNTER
----- Message from Dr. Michael Santamaria MD sent at 8/25/2024  2:24 PM EDT -----  Blood work is good. Thank you

## 2024-11-12 ENCOUNTER — TELEPHONE (OUTPATIENT)
Dept: CARDIOLOGY | Age: 69
End: 2024-11-12

## 2024-11-20 NOTE — PROGRESS NOTES
Patient received NPO instructions and pre-op medication instructions to be taken on the day of the procedure with a small sip of water. Pt was also given pre-op eye drop instructions from Dr. Jeffery's office. NPO status (including no gum, hard candy, mints, water, coffee, or smoking) was reviewed and patient verbalizes understanding. Patient denies any fever related illnesses or antibiotic use in the last 4 weeks. Patient does have cardiac clearance via Dr. Santamaria's office.

## 2024-11-24 PROBLEM — H25.812 COMBINED FORMS OF AGE-RELATED CATARACT OF LEFT EYE: Chronic | Status: ACTIVE | Noted: 2024-11-24

## 2024-11-25 ENCOUNTER — HOSPITAL ENCOUNTER (OUTPATIENT)
Age: 69
Setting detail: OUTPATIENT SURGERY
Discharge: HOME | End: 2024-11-25
Attending: OPHTHALMOLOGY | Admitting: OPHTHALMOLOGY
Payer: MEDICARE

## 2024-11-25 ENCOUNTER — ANESTHESIA EVENT (OUTPATIENT)
Dept: OPERATING ROOM | Age: 69
End: 2024-11-25
Payer: MEDICARE

## 2024-11-25 ENCOUNTER — ANESTHESIA (OUTPATIENT)
Dept: OPERATING ROOM | Age: 69
End: 2024-11-25
Payer: MEDICARE

## 2024-11-25 VITALS
OXYGEN SATURATION: 95 % | BODY MASS INDEX: 39.52 KG/M2 | RESPIRATION RATE: 20 BRPM | HEIGHT: 69 IN | WEIGHT: 266.8 LBS | HEART RATE: 95 BPM | SYSTOLIC BLOOD PRESSURE: 135 MMHG | TEMPERATURE: 98 F | DIASTOLIC BLOOD PRESSURE: 68 MMHG

## 2024-11-25 PROBLEM — H25.812 COMBINED FORMS OF AGE-RELATED CATARACT OF LEFT EYE: Chronic | Status: RESOLVED | Noted: 2024-11-24 | Resolved: 2024-11-25

## 2024-11-25 PROCEDURE — 2709999900 HC NON-CHARGEABLE SUPPLY: Performed by: OPHTHALMOLOGY

## 2024-11-25 PROCEDURE — 3700000000 HC ANESTHESIA ATTENDED CARE: Performed by: OPHTHALMOLOGY

## 2024-11-25 PROCEDURE — 7100000011 HC PHASE II RECOVERY - ADDTL 15 MIN: Performed by: OPHTHALMOLOGY

## 2024-11-25 PROCEDURE — 6360000002 HC RX W HCPCS: Performed by: OPHTHALMOLOGY

## 2024-11-25 PROCEDURE — 3600000013 HC SURGERY LEVEL 3 ADDTL 15MIN: Performed by: OPHTHALMOLOGY

## 2024-11-25 PROCEDURE — 3600000003 HC SURGERY LEVEL 3 BASE: Performed by: OPHTHALMOLOGY

## 2024-11-25 PROCEDURE — 6360000002 HC RX W HCPCS: Performed by: NURSE ANESTHETIST, CERTIFIED REGISTERED

## 2024-11-25 PROCEDURE — 7100000010 HC PHASE II RECOVERY - FIRST 15 MIN: Performed by: OPHTHALMOLOGY

## 2024-11-25 PROCEDURE — 2500000003 HC RX 250 WO HCPCS: Performed by: OPHTHALMOLOGY

## 2024-11-25 PROCEDURE — 6370000000 HC RX 637 (ALT 250 FOR IP): Performed by: OPHTHALMOLOGY

## 2024-11-25 PROCEDURE — V2632 POST CHMBR INTRAOCULAR LENS: HCPCS | Performed by: OPHTHALMOLOGY

## 2024-11-25 PROCEDURE — 3700000001 HC ADD 15 MINUTES (ANESTHESIA): Performed by: OPHTHALMOLOGY

## 2024-11-25 DEVICE — LENS INTOCU +20 DIOPT L12.5MM DIA6MM 119.1 OPTICAL/118.7 A: Type: IMPLANTABLE DEVICE | Site: EYE | Status: FUNCTIONAL

## 2024-11-25 RX ORDER — SODIUM CHLORIDE 0.9 % (FLUSH) 0.9 %
5-40 SYRINGE (ML) INJECTION PRN
Status: DISCONTINUED | OUTPATIENT
Start: 2024-11-25 | End: 2024-11-25 | Stop reason: HOSPADM

## 2024-11-25 RX ORDER — TROPICAMIDE 10 MG/ML
1 SOLUTION/ DROPS OPHTHALMIC SEE ADMIN INSTRUCTIONS
Status: DISCONTINUED | OUTPATIENT
Start: 2024-11-25 | End: 2024-11-25 | Stop reason: HOSPADM

## 2024-11-25 RX ORDER — NALOXONE HYDROCHLORIDE 0.4 MG/ML
INJECTION, SOLUTION INTRAMUSCULAR; INTRAVENOUS; SUBCUTANEOUS PRN
Status: DISCONTINUED | OUTPATIENT
Start: 2024-11-25 | End: 2024-11-25 | Stop reason: HOSPADM

## 2024-11-25 RX ORDER — LIDOCAINE HYDROCHLORIDE 20 MG/ML
INJECTION, SOLUTION INFILTRATION; PERINEURAL PRN
Status: DISCONTINUED | OUTPATIENT
Start: 2024-11-25 | End: 2024-11-25 | Stop reason: ALTCHOICE

## 2024-11-25 RX ORDER — SODIUM CHLORIDE 0.9 % (FLUSH) 0.9 %
5-40 SYRINGE (ML) INJECTION EVERY 12 HOURS SCHEDULED
Status: DISCONTINUED | OUTPATIENT
Start: 2024-11-25 | End: 2024-11-25 | Stop reason: HOSPADM

## 2024-11-25 RX ORDER — TETRACAINE HYDROCHLORIDE 5 MG/ML
1 SOLUTION OPHTHALMIC SEE ADMIN INSTRUCTIONS
Status: DISCONTINUED | OUTPATIENT
Start: 2024-11-25 | End: 2024-11-25 | Stop reason: HOSPADM

## 2024-11-25 RX ORDER — SODIUM CHLORIDE 9 MG/ML
INJECTION, SOLUTION INTRAVENOUS PRN
Status: DISCONTINUED | OUTPATIENT
Start: 2024-11-25 | End: 2024-11-25 | Stop reason: HOSPADM

## 2024-11-25 RX ORDER — MIDAZOLAM HYDROCHLORIDE 1 MG/ML
INJECTION, SOLUTION INTRAMUSCULAR; INTRAVENOUS
Status: DISCONTINUED | OUTPATIENT
Start: 2024-11-25 | End: 2024-11-25 | Stop reason: SDUPTHER

## 2024-11-25 RX ORDER — FENTANYL CITRATE 50 UG/ML
INJECTION, SOLUTION INTRAMUSCULAR; INTRAVENOUS
Status: DISCONTINUED | OUTPATIENT
Start: 2024-11-25 | End: 2024-11-25 | Stop reason: SDUPTHER

## 2024-11-25 RX ORDER — TETRACAINE HYDROCHLORIDE 5 MG/ML
SOLUTION OPHTHALMIC PRN
Status: DISCONTINUED | OUTPATIENT
Start: 2024-11-25 | End: 2024-11-25 | Stop reason: ALTCHOICE

## 2024-11-25 RX ORDER — PHENYLEPHRINE HYDROCHLORIDE 25 MG/ML
1 SOLUTION/ DROPS OPHTHALMIC SEE ADMIN INSTRUCTIONS
Status: DISCONTINUED | OUTPATIENT
Start: 2024-11-25 | End: 2024-11-25 | Stop reason: HOSPADM

## 2024-11-25 RX ORDER — SODIUM CHLORIDE 9 MG/ML
INJECTION, SOLUTION INTRAVENOUS CONTINUOUS
Status: DISCONTINUED | OUTPATIENT
Start: 2024-11-25 | End: 2024-11-25 | Stop reason: HOSPADM

## 2024-11-25 RX ORDER — PROPARACAINE HYDROCHLORIDE 5 MG/ML
1 SOLUTION/ DROPS OPHTHALMIC SEE ADMIN INSTRUCTIONS
Status: DISCONTINUED | OUTPATIENT
Start: 2024-11-25 | End: 2024-11-25 | Stop reason: HOSPADM

## 2024-11-25 RX ADMIN — MIDAZOLAM 1 MG: 1 INJECTION INTRAMUSCULAR; INTRAVENOUS at 11:18

## 2024-11-25 RX ADMIN — TROPICAMIDE 1 DROP: 10 SOLUTION/ DROPS OPHTHALMIC at 10:50

## 2024-11-25 RX ADMIN — PROPARACAINE HYDROCHLORIDE 1 DROP: 5 SOLUTION/ DROPS OPHTHALMIC at 10:50

## 2024-11-25 RX ADMIN — PROPARACAINE HYDROCHLORIDE 1 DROP: 5 SOLUTION/ DROPS OPHTHALMIC at 10:45

## 2024-11-25 RX ADMIN — TROPICAMIDE 1 DROP: 10 SOLUTION/ DROPS OPHTHALMIC at 10:57

## 2024-11-25 RX ADMIN — PROPARACAINE HYDROCHLORIDE 1 DROP: 5 SOLUTION/ DROPS OPHTHALMIC at 10:57

## 2024-11-25 RX ADMIN — PHENYLEPHRINE HYDROCHLORIDE 1 DROP: 25 SOLUTION/ DROPS OPHTHALMIC at 10:50

## 2024-11-25 RX ADMIN — FENTANYL CITRATE 50 MCG: 50 INJECTION INTRAMUSCULAR; INTRAVENOUS at 11:18

## 2024-11-25 RX ADMIN — PHENYLEPHRINE HYDROCHLORIDE 1 DROP: 25 SOLUTION/ DROPS OPHTHALMIC at 10:57

## 2024-11-25 RX ADMIN — TROPICAMIDE 1 DROP: 10 SOLUTION/ DROPS OPHTHALMIC at 10:45

## 2024-11-25 RX ADMIN — PHENYLEPHRINE HYDROCHLORIDE 1 DROP: 25 SOLUTION/ DROPS OPHTHALMIC at 10:45

## 2024-11-25 ASSESSMENT — LIFESTYLE VARIABLES: SMOKING_STATUS: 0

## 2024-11-25 ASSESSMENT — PAIN SCALES - GENERAL
PAINLEVEL_OUTOF10: 0

## 2024-11-25 ASSESSMENT — PAIN - FUNCTIONAL ASSESSMENT: PAIN_FUNCTIONAL_ASSESSMENT: 0-10

## 2024-11-25 NOTE — ANESTHESIA POSTPROCEDURE EVALUATION
Department of Anesthesiology  Postprocedure Note    Patient: Kenny Vallejo  MRN: [id number]  YOB: 1955  Date of evaluation: 11/25/2024    Procedure Summary       Date: 11/25/24 Room / Location: [address]    Anesthesia Start: 1116 Anesthesia Stop: 1141    Procedure: EYE CATARACT EMULSIFICATION INTRAOCULAR LENS IMPLANT (Left: Eye) Diagnosis:       Combined forms of age-related cataract of left eye      (Combined forms of age-related cataract of left eye [H25.812])    Surgeons: Julián Jeffery DO Responsible Provider: Cecil Andres APRN - CRNA    Anesthesia Type: MAC ASA Status: 3            Anesthesia Type: No value filed.    Jorge Phase I: Jorge Score: 10    Jorge Phase II: Jorge Score: 10    Anesthesia Post Evaluation    Patient location during evaluation: bedside  Patient participation: complete - patient participated  Level of consciousness: awake and alert  Airway patency: patent  Nausea & Vomiting: no nausea and no vomiting  Cardiovascular status: hemodynamically stable  Respiratory status: acceptable  Hydration status: stable  Multimodal analgesia pain management approach  Pain management: adequate    No notable events documented.

## 2024-11-25 NOTE — DISCHARGE INSTRUCTIONS
SAME DAY SURGERY DISCHARGE INSTRUCTIONS    1.  Do not drive or operate hazardous machinery for 24 hours.    2.  Do not make important personal or business decisions for 24 hours.    3.  Do not drink alcoholic beverages for 24 hours.    4.  Do not smoke tobacco products for 24 hours.    5.  Limit your activities for 24 hours.  Do not engage in heavy work until your surgeon gives you permission.      6.  Patient should not be left alone for 12-24 hours following surgical procedure.    7.  Wash hands before and after incision care.  It is important to practice good personal hygiene during the post op period.    8.  Report the following signs or any questions regarding your physical condition to your surgeon immediately:    Excessive swelling of, or around the wound area.    Redness.    Temperature of 100 degrees (F) or above.    Excessive pain.    9.  Call your surgeon for any questions regarding your surgery.      CATARACT DISCHARGE INSTRUCTIONS    Do not remove eye patch/shield today.    Protect the operated eye during sleep by covering it with clear plastic shield.  Tape the shield securely to the face before retiring .  Do this for one week after surgery.    Avoid bumping the operated eye during the daytime.    Sensitivity to light and watering of the eye is normal during the first month.  Wearing of dark glasses will help these symptoms and this is optional.    Minor crusting and discharge adherent to the lid margins will persist till the incision heals.  Cleanse the lids by application of a warm compress several times a day as needed.    Use of either the operated eye or unoperated eye is not harmful.  Until the new glasses are prescribed, the operated eye may be out of focus and may not see details clearly.  Vision maybe clearer in the operated eye without glasses.    You may do everything necessary to care for yourself, including hair care, tooth brushing, dressing, etc. Light work,including stooping over and  lifting, is not harmful.    Please phone if any problems arise during the healing period.  The office number is [phone].      Take surgery bag and all eye drops to Dr. Jeffery's office tomorrow at 9:10am.    You may resume your normal diet.    Start your eye drops tomorrow after your post-op appointment:    Ofloxacin/Polytrim one drop to the operated eye 4 times daily    Prednisolone one drop to the operated eye 4 times daily

## 2024-11-25 NOTE — PROGRESS NOTES
Discharge instructions reviewed with pt and wife Marla. All questions answered. Pt verbalizes readiness to go home.    Discharge Criteria    Inpatients must meet Criteria 1 through 7. All other patients are either YES or N/A. If a NO is chosen then Anesthesia or Surgeon must be notified.      1.  Minimum 30 minutes after last dose of sedative medication.    Yes      2.  Systolic BP between 90 - 160. Diastolic BP between 60 - 90.    Yes      3.  Pulse between 60 - 120    Yes      4.  Respirations between 8 - 25.    Yes      5.  SpO2 92% - 100%.    Yes      6.  Able to cough and swallow or return to baseline function.    Yes      7.  Alert and oriented or return to baseline mental status.    Yes      8.  Demonstrates controlled, coordinated movements, ambulates with steady gait, or return to baseline activity function.    Yes      9.  Minimal or no pain or nausea, or at a level tolerable and acceptable to patient.    Yes      10. Takes and retains oral fluids as allowed.    Yes      11. Procedural / perioperative site stable.  Minimal or no bleeding.    Yes          12. If GI endoscopy procedure, minimal or no abdominal distention or passing flatus.    N/A      13. Written discharge instructions and emergency telephone number provided.    Yes      14. Accompanied by a responsible adult.    Yes

## 2024-11-25 NOTE — H&P
I have examined the patient and reviewed the history and physical completed on 11/24/24 and find no relevant changes.    I have reviewed with the patient and/or family the risks, benefits, and alternatives to the procedure and they have agreed to proceed.    Julián Jeffery DO  11/25/2024  11:07 AM

## 2024-11-25 NOTE — OP NOTE
OPERATIVE NOTE      Patient:  Kenny Vallejo    YOB: 1955    Account #:  [id number]    Date:  11/25/2024    Surgeon:  Julián Jeffery DO    Primary Care Physician:Lencho Tapia MD      Preoperative Diagnosis: Age-Related Nuclear Cataract- left eye    Postoperative Diagnosis: Same    Procedure: Phacoemulsification with intraocular lens implantation, left eye    Anesthesia: Topical and intracameral anesthesia with intravenous sedation    Complications: none    Specimens: none    Indications for procedure:  The patient is a 69 y.o. year old with decreased vision, glare and halos around lights, and trouble with activities of daily living.  Examination revealed a visually significant cataract in the left eye.  Other eye diseases have been ruled out as the primary cause of decreased visual function.  Significant improvement is expected in the patient's visual acuity and/or visual function from the removal of the cataract.  Risks, benefits, and alternatives to surgery were discussed with the patient and the patient elected to proceed with phacoemulsification with lens implantation.    Details of the procedure:  Following informed consent, the patient was identified by name and identification tag in the holding area,taken to the operating room and placed in the supine position.  A time out was performed by all present in the room to identify the patient, the eye to be operated on, the correct operative procedure, and the correct intraocular lens.  Monitoring leads were placed.  The patient was positioned.  An eyelid prep of half-strength Betadine was performed.  The patient was administered intravenous sedation if desired and topical anesthetic was placed in the operative eye.  The eye prepped a second time and draped in the usual sterile fashion using aseptic technique for cataract surgery.  A lid speculum was then inserted.  Ocucoat was placed onto the corneal surface.  A stab incision was made using a

## 2024-11-25 NOTE — H&P
Kenny Vallejo is a 69 y.o. year old who presents for elective outpatient ophthalmic surgery with Julián Jeffery DO.  The patient complains of decreased vision, glare and halos around lights, and trouble with vision for activities of daily living.      Review of systems  Positive for decreased vision, glare and halos around lights, and trouble with activities of daily living.      All other review of systems were negative.    Past Medical History:   Diagnosis Date    CAD (coronary artery disease)     s/p multiple stents     History of DVT (deep vein thrombosis) 1990    right calf DVT    Hyperlipidemia     Hypertension     Morbid obesity        Past Surgical History:   Procedure Laterality Date    CARDIAC CATHETERIZATION Left 04/17/2018    Right radial/[facility] [employer] [facility] [employer] [facility]/Dr Wren/95% in stent-restenosis in the proximal LAD with a 95% ostial 95% stenosis in a fairly large D1 branch that appears to be jailed. Normal LVEDP    CARDIAC CATHETERIZATION Left 03/09/2022    diagnostic via right radial approach/ [facility]/ Dr. Sg Wren    COLONOSCOPY  04/26/2019    COLONOSCOPY N/A 4/26/2019    COLONOSCOPY POLYPECTOMY SNARE/COLD BIOPSY performed by Elo Bhardwaj DO at [facility] OR    CORONARY ANGIOPLASTY WITH STENT PLACEMENT      4 stents placed- 7 total    KNEE SURGERY      PROSTATE SURGERY  07/2021    Prostate biopsy    SHOULDER SURGERY         Home meds:   Prior to Admission medications    Medication Sig Start Date End Date Taking? Authorizing Provider   nitroGLYCERIN (NITROLINGUAL) 0.4 MG/SPRAY 0.4 mg spray Place 2 sprays under the tongue every 5 minutes as needed for Chest pain 8/21/24   Michael Santamaria MD   carvedilol (COREG) 3.125 MG tablet TAKE ONE TABLET BY MOUTH TWICE A DAY 5/8/24   Katina Alvarez PA-C   furosemide (LASIX) 20 MG tablet TAKE ONE TABLET BY MOUTH DAILY 9/18/23   Katina Alvarez PA-C   BRILINTA 60 MG TABS tablet TAKE ONE TABLET BY MOUTH TWICE A DAY 5/18/23   Michael Santamaria

## 2024-11-25 NOTE — ANESTHESIA PRE PROCEDURE
Endo/Other ROS   (+) blood dyscrasia: anticoagulation therapy:..                 Abdominal:   (+) obese          Vascular:   + DVT (right LE 30 years ago).        ROS comment: resolved. Other Findings:             Anesthesia Plan      MAC     ASA 3       Induction: intravenous.      Anesthetic plan and risks discussed with patient.      Plan discussed with CRNA.                    JENNA Sarah - MARK   11/25/2024

## 2025-01-20 NOTE — PROGRESS NOTES
Patient instructed to call me back to update any changes since his left cataract surgery 11/25/24. If there are no changes he does not need to call me back. Reminded patient nothing to eat or drink after midnight the night before.

## 2025-01-22 NOTE — PROGRESS NOTES
No changes since last cataract surgery. Patient knows what to do. NPO after midnight, night before. Patient reminded that he will be called with time of arrival on the Friday before after 2pm. He verbalized understanding.

## 2025-01-24 ENCOUNTER — ANESTHESIA EVENT (OUTPATIENT)
Dept: OPERATING ROOM | Age: 70
End: 2025-01-24
Payer: MEDICARE

## 2025-01-26 PROBLEM — H25.11 AGE-RELATED NUCLEAR CATARACT OF RIGHT EYE: Chronic | Status: ACTIVE | Noted: 2025-01-26

## 2025-01-27 ENCOUNTER — HOSPITAL ENCOUNTER (OUTPATIENT)
Age: 70
Setting detail: OUTPATIENT SURGERY
Discharge: HOME | End: 2025-01-27
Attending: OPHTHALMOLOGY | Admitting: OPHTHALMOLOGY
Payer: MEDICARE

## 2025-01-27 ENCOUNTER — ANESTHESIA (OUTPATIENT)
Dept: OPERATING ROOM | Age: 70
End: 2025-01-27
Payer: MEDICARE

## 2025-01-27 VITALS
OXYGEN SATURATION: 93 % | BODY MASS INDEX: 39.72 KG/M2 | HEART RATE: 69 BPM | WEIGHT: 268.2 LBS | SYSTOLIC BLOOD PRESSURE: 137 MMHG | DIASTOLIC BLOOD PRESSURE: 62 MMHG | HEIGHT: 69 IN | TEMPERATURE: 97.7 F | RESPIRATION RATE: 16 BRPM

## 2025-01-27 PROBLEM — H25.11 AGE-RELATED NUCLEAR CATARACT OF RIGHT EYE: Chronic | Status: RESOLVED | Noted: 2025-01-26 | Resolved: 2025-01-27

## 2025-01-27 PROCEDURE — 2709999900 HC NON-CHARGEABLE SUPPLY: Performed by: OPHTHALMOLOGY

## 2025-01-27 PROCEDURE — 2500000003 HC RX 250 WO HCPCS: Performed by: OPHTHALMOLOGY

## 2025-01-27 PROCEDURE — 6360000002 HC RX W HCPCS: Performed by: OPHTHALMOLOGY

## 2025-01-27 PROCEDURE — 7100000011 HC PHASE II RECOVERY - ADDTL 15 MIN: Performed by: OPHTHALMOLOGY

## 2025-01-27 PROCEDURE — 6370000000 HC RX 637 (ALT 250 FOR IP): Performed by: OPHTHALMOLOGY

## 2025-01-27 PROCEDURE — 3700000001 HC ADD 15 MINUTES (ANESTHESIA): Performed by: OPHTHALMOLOGY

## 2025-01-27 PROCEDURE — 3600000003 HC SURGERY LEVEL 3 BASE: Performed by: OPHTHALMOLOGY

## 2025-01-27 PROCEDURE — 7100000010 HC PHASE II RECOVERY - FIRST 15 MIN: Performed by: OPHTHALMOLOGY

## 2025-01-27 PROCEDURE — V2632 POST CHMBR INTRAOCULAR LENS: HCPCS | Performed by: OPHTHALMOLOGY

## 2025-01-27 PROCEDURE — 3600000013 HC SURGERY LEVEL 3 ADDTL 15MIN: Performed by: OPHTHALMOLOGY

## 2025-01-27 PROCEDURE — 6360000002 HC RX W HCPCS: Performed by: NURSE ANESTHETIST, CERTIFIED REGISTERED

## 2025-01-27 PROCEDURE — 3700000000 HC ANESTHESIA ATTENDED CARE: Performed by: OPHTHALMOLOGY

## 2025-01-27 DEVICE — LENS INTOCU +20 DIOPT L12.5MM DIA6MM 119.1 OPTICAL/118.7 A: Type: IMPLANTABLE DEVICE | Site: EYE | Status: FUNCTIONAL

## 2025-01-27 RX ORDER — SODIUM CHLORIDE 0.9 % (FLUSH) 0.9 %
5-40 SYRINGE (ML) INJECTION EVERY 12 HOURS SCHEDULED
Status: DISCONTINUED | OUTPATIENT
Start: 2025-01-27 | End: 2025-01-27 | Stop reason: HOSPADM

## 2025-01-27 RX ORDER — LIDOCAINE HYDROCHLORIDE 10 MG/ML
INJECTION, SOLUTION EPIDURAL; INFILTRATION; INTRACAUDAL; PERINEURAL PRN
Status: DISCONTINUED | OUTPATIENT
Start: 2025-01-27 | End: 2025-01-27 | Stop reason: ALTCHOICE

## 2025-01-27 RX ORDER — FENTANYL CITRATE 50 UG/ML
INJECTION, SOLUTION INTRAMUSCULAR; INTRAVENOUS
Status: DISCONTINUED | OUTPATIENT
Start: 2025-01-27 | End: 2025-01-27 | Stop reason: SDUPTHER

## 2025-01-27 RX ORDER — PHENYLEPHRINE HYDROCHLORIDE 25 MG/ML
1 SOLUTION/ DROPS OPHTHALMIC SEE ADMIN INSTRUCTIONS
Status: DISCONTINUED | OUTPATIENT
Start: 2025-01-27 | End: 2025-01-27 | Stop reason: HOSPADM

## 2025-01-27 RX ORDER — TROPICAMIDE 10 MG/ML
1 SOLUTION/ DROPS OPHTHALMIC SEE ADMIN INSTRUCTIONS
Status: DISCONTINUED | OUTPATIENT
Start: 2025-01-27 | End: 2025-01-27 | Stop reason: HOSPADM

## 2025-01-27 RX ORDER — SODIUM CHLORIDE, SODIUM LACTATE, POTASSIUM CHLORIDE, CALCIUM CHLORIDE 600; 310; 30; 20 MG/100ML; MG/100ML; MG/100ML; MG/100ML
INJECTION, SOLUTION INTRAVENOUS CONTINUOUS
Status: DISCONTINUED | OUTPATIENT
Start: 2025-01-27 | End: 2025-01-27 | Stop reason: HOSPADM

## 2025-01-27 RX ORDER — TETRACAINE HYDROCHLORIDE 5 MG/ML
SOLUTION OPHTHALMIC PRN
Status: DISCONTINUED | OUTPATIENT
Start: 2025-01-27 | End: 2025-01-27 | Stop reason: ALTCHOICE

## 2025-01-27 RX ORDER — PROPARACAINE HYDROCHLORIDE 5 MG/ML
1 SOLUTION/ DROPS OPHTHALMIC SEE ADMIN INSTRUCTIONS
Status: DISCONTINUED | OUTPATIENT
Start: 2025-01-27 | End: 2025-01-27 | Stop reason: HOSPADM

## 2025-01-27 RX ORDER — MIDAZOLAM HYDROCHLORIDE 1 MG/ML
INJECTION, SOLUTION INTRAMUSCULAR; INTRAVENOUS
Status: DISCONTINUED | OUTPATIENT
Start: 2025-01-27 | End: 2025-01-27 | Stop reason: SDUPTHER

## 2025-01-27 RX ORDER — SODIUM CHLORIDE 0.9 % (FLUSH) 0.9 %
5-40 SYRINGE (ML) INJECTION PRN
Status: DISCONTINUED | OUTPATIENT
Start: 2025-01-27 | End: 2025-01-27 | Stop reason: HOSPADM

## 2025-01-27 RX ORDER — SODIUM CHLORIDE 9 MG/ML
INJECTION, SOLUTION INTRAVENOUS PRN
Status: CANCELLED | OUTPATIENT
Start: 2025-01-27

## 2025-01-27 RX ORDER — SODIUM CHLORIDE 9 MG/ML
INJECTION, SOLUTION INTRAVENOUS CONTINUOUS
Status: DISCONTINUED | OUTPATIENT
Start: 2025-01-27 | End: 2025-01-27 | Stop reason: HOSPADM

## 2025-01-27 RX ORDER — TETRACAINE HYDROCHLORIDE 5 MG/ML
1 SOLUTION OPHTHALMIC SEE ADMIN INSTRUCTIONS
Status: DISCONTINUED | OUTPATIENT
Start: 2025-01-27 | End: 2025-01-27 | Stop reason: HOSPADM

## 2025-01-27 RX ORDER — SODIUM CHLORIDE 9 MG/ML
INJECTION, SOLUTION INTRAVENOUS PRN
Status: DISCONTINUED | OUTPATIENT
Start: 2025-01-27 | End: 2025-01-27 | Stop reason: HOSPADM

## 2025-01-27 RX ORDER — SODIUM CHLORIDE 0.9 % (FLUSH) 0.9 %
5-40 SYRINGE (ML) INJECTION PRN
Status: CANCELLED | OUTPATIENT
Start: 2025-01-27

## 2025-01-27 RX ORDER — SODIUM CHLORIDE 0.9 % (FLUSH) 0.9 %
5-40 SYRINGE (ML) INJECTION EVERY 12 HOURS SCHEDULED
Status: CANCELLED | OUTPATIENT
Start: 2025-01-27

## 2025-01-27 RX ADMIN — FENTANYL CITRATE 50 MCG: 50 INJECTION INTRAMUSCULAR; INTRAVENOUS at 13:37

## 2025-01-27 RX ADMIN — PROPARACAINE HYDROCHLORIDE 1 DROP: 5 SOLUTION/ DROPS OPHTHALMIC at 13:13

## 2025-01-27 RX ADMIN — MIDAZOLAM 2 MG: 1 INJECTION INTRAMUSCULAR; INTRAVENOUS at 13:34

## 2025-01-27 RX ADMIN — PROPARACAINE HYDROCHLORIDE 1 DROP: 5 SOLUTION/ DROPS OPHTHALMIC at 12:59

## 2025-01-27 RX ADMIN — TETRACAINE HYDROCHLORIDE 1 DROP: 5 SOLUTION OPHTHALMIC at 13:28

## 2025-01-27 RX ADMIN — PHENYLEPHRINE HYDROCHLORIDE 1 DROP: 25 SOLUTION/ DROPS OPHTHALMIC at 13:13

## 2025-01-27 RX ADMIN — FENTANYL CITRATE 50 MCG: 50 INJECTION INTRAMUSCULAR; INTRAVENOUS at 13:49

## 2025-01-27 RX ADMIN — TROPICAMIDE 1 DROP: 10 SOLUTION/ DROPS OPHTHALMIC at 12:59

## 2025-01-27 RX ADMIN — TROPICAMIDE 1 DROP: 10 SOLUTION/ DROPS OPHTHALMIC at 13:13

## 2025-01-27 RX ADMIN — TROPICAMIDE 1 DROP: 10 SOLUTION/ DROPS OPHTHALMIC at 12:54

## 2025-01-27 RX ADMIN — PROPARACAINE HYDROCHLORIDE 1 DROP: 5 SOLUTION/ DROPS OPHTHALMIC at 12:54

## 2025-01-27 RX ADMIN — PHENYLEPHRINE HYDROCHLORIDE 1 DROP: 25 SOLUTION/ DROPS OPHTHALMIC at 12:59

## 2025-01-27 RX ADMIN — PHENYLEPHRINE HYDROCHLORIDE 1 DROP: 25 SOLUTION/ DROPS OPHTHALMIC at 12:54

## 2025-01-27 ASSESSMENT — PAIN - FUNCTIONAL ASSESSMENT
PAIN_FUNCTIONAL_ASSESSMENT: NONE - DENIES PAIN
PAIN_FUNCTIONAL_ASSESSMENT: 0-10
PAIN_FUNCTIONAL_ASSESSMENT: 0-10

## 2025-01-27 ASSESSMENT — LIFESTYLE VARIABLES: SMOKING_STATUS: 0

## 2025-01-27 NOTE — H&P
I have examined the patient and reviewed the history and physical completed on 1/26/25 and find no relevant changes.    I have reviewed with the patient and/or family the risks, benefits, and alternatives to the procedure and they have agreed to proceed.    Julián Jeffery,   1/27/2025  1:21 PM

## 2025-01-27 NOTE — ANESTHESIA PRE PROCEDURE
reviewed  Rhythm: regular  Rate: normal           Beta Blocker:  Dose within 24 Hrs         Neuro/Psych:   Negative Neuro/Psych ROS              GI/Hepatic/Renal:   (+) morbid obesity          Endo/Other: Negative Endo/Other ROS   (+) blood dyscrasia: anticoagulation therapy:..                 Abdominal:   (+) obese          Vascular:   + DVT (right LE 30 years ago).        ROS comment: resolved. Other Findings:             Anesthesia Plan      MAC     ASA 3       Induction: intravenous.      Anesthetic plan and risks discussed with patient.    Use of blood products discussed with patient whom consented to blood products.    Plan discussed with CRNA.                    Zuleyka Navarro, APRN - CRNA   1/27/2025

## 2025-01-27 NOTE — PROGRESS NOTES
Patient verbalizes readiness for discharge. Discharge instructions given to patient and responsible adult, answered all questions, and verbalized understanding of discharge instructions.     Discharge Criteria    Inpatients must meet Criteria 1 through 7. All other patients are either YES or N/A. If a NO is chosen then Anesthesia or Surgeon must be notified.      1.  Minimum 30 minutes after last dose of sedative medication.    Yes      2.  Systolic BP between 90 - 160. Diastolic BP between 60 - 90.    Yes      3.  Pulse between 60 - 120    Yes      4.  Respirations between 8 - 25.    Yes      5.  SpO2 92% - 100%.    Yes      6.  Able to cough and swallow or return to baseline function.    Yes      7.  Alert and oriented or return to baseline mental status.    Yes      8.  Demonstrates controlled, coordinated movements, ambulates with steady gait, or return to baseline activity function.    Yes      9.  Minimal or no pain or nausea, or at a level tolerable and acceptable to patient.    Yes      10. Takes and retains oral fluids as allowed.    Yes      11. Procedural / perioperative site stable.  Minimal or no bleeding.    Yes          12. If GI endoscopy procedure, minimal or no abdominal distention or passing flatus.    N/A      13. Written discharge instructions and emergency telephone number provided.    Yes      14. Accompanied by a responsible adult.    Yes

## 2025-01-27 NOTE — ANESTHESIA POSTPROCEDURE EVALUATION
Department of Anesthesiology  Postprocedure Note    Patient: Kenny Vallejo  MRN: [id number]  YOB: 1955  Date of evaluation: 1/27/2025    Procedure Summary       Date: 01/27/25 Room / Location: [address]    Anesthesia Start: 1334 Anesthesia Stop: 1354    Procedure: EYE CATARACT EMULSIFICATION INTRAOCULAR LENS IMPLANT (Right: Eye) Diagnosis:       Age-related nuclear cataract, right eye      (Age-related nuclear cataract, right eye [H25.11])    Surgeons: Julián Jeffery DO Responsible Provider: Diogo Childs APRN - CRNA    Anesthesia Type: MAC ASA Status: 3            Anesthesia Type: No value filed.    Jorge Phase I: Jorge Score: 10    Jorge Phase II:      Anesthesia Post Evaluation    Patient location during evaluation: bedside  Patient participation: complete - patient participated  Level of consciousness: awake and alert  Pain score: 0  Airway patency: patent  Nausea & Vomiting: no nausea and no vomiting  Cardiovascular status: blood pressure returned to baseline  Respiratory status: acceptable  Hydration status: stable  Multimodal analgesia pain management approach  Pain management: adequate    No notable events documented.

## 2025-01-27 NOTE — OP NOTE
OPERATIVE NOTE      Patient:  Kenny Vallejo    YOB: 1955    Account #:  [id number]    Date:  1/27/2025    Surgeon:  Julián Jeffery DO    Primary Care Physician:Lencho Tapia MD      Preoperative Diagnosis: Age-Related Nuclear Cataract- right eye    Postoperative Diagnosis: Same    Procedure: Phacoemulsification with intraocular lens implantation, right eye    Anesthesia: Topical and intracameral anesthesia with intravenous sedation    Complications: none    Specimens: none    Indications for procedure:  The patient is a 69 y.o. year old with decreased vision, glare and halos around lights, and trouble with activities of daily living.  Examination revealed a visually significant cataract in the right eye.  Other eye diseases have been ruled out as the primary cause of decreased visual function.  Significant improvement is expected in the patient's visual acuity and/or visual function from the removal of the cataract.  Risks, benefits, and alternatives to surgery were discussed with the patient and the patient elected to proceed with phacoemulsification with lens implantation.    Details of the procedure:  Following informed consent, the patient was identified by name and identification tag in the holding area,taken to the operating room and placed in the supine position.  A time out was performed by all present in the room to identify the patient, the eye to be operated on, the correct operative procedure, and the correct intraocular lens.  Monitoring leads were placed.  The patient was positioned.  An eyelid prep of half-strength Betadine was performed.  The patient was administered intravenous sedation if desired and topical anesthetic was placed in the operative eye.  The eye prepped a second time and draped in the usual sterile fashion using aseptic technique for cataract surgery.  A lid speculum was then inserted.  Ocucoat was placed onto the corneal surface.  A stab incision was made using a

## 2025-01-27 NOTE — H&P
Kenny Vallejo is a 69 y.o. year old who presents for elective outpatient ophthalmic surgery with Julián Jeffery DO.  The patient complains of decreased vision, glare and halos around lights, and trouble with vision for activities of daily living.      Review of systems  Positive for decreased vision, glare and halos around lights, and trouble with activities of daily living.      All other review of systems were negative.    Past Medical History:   Diagnosis Date    CAD (coronary artery disease)     s/p multiple stents     History of DVT (deep vein thrombosis) 1990    right calf DVT    Hyperlipidemia     Hypertension     Morbid obesity        Past Surgical History:   Procedure Laterality Date    CARDIAC CATHETERIZATION Left 04/17/2018    Right radial/[employer] [facility]/Dr Wren/95% in stent-restenosis in the proximal LAD with a 95% ostial 95% stenosis in a fairly large D1 branch that appears to be jailed. Normal LVEDP    CARDIAC CATHETERIZATION Left 03/09/2022    diagnostic via right radial approach/ [facility]/ Dr. Sg Wren    COLONOSCOPY  04/26/2019    COLONOSCOPY N/A 4/26/2019    COLONOSCOPY POLYPECTOMY SNARE/COLD BIOPSY performed by Elo Bhardwaj DO at [facility] OR    CORONARY ANGIOPLASTY WITH STENT PLACEMENT      4 stents placed- 7 total    EYE SURGERY Left 11/25/2024    EYE CATARACT EMULSIFICATION INTRAOCULAR LENS IMPLANT performed by Julián Jeffery DO at [facility] OR    KNEE SURGERY      PROSTATE SURGERY  07/2021    Prostate biopsy    SHOULDER SURGERY         Home meds:   Prior to Admission medications    Medication Sig Start Date End Date Taking? Authorizing Provider   nitroGLYCERIN (NITROLINGUAL) 0.4 MG/SPRAY 0.4 mg spray Place 2 sprays under the tongue every 5 minutes as needed for Chest pain 8/21/24   Michael Santamaria MD   carvedilol (COREG) 3.125 MG tablet TAKE ONE TABLET BY MOUTH TWICE A DAY 5/8/24   Katina Alvarez PA-C   furosemide (LASIX) 20 MG tablet TAKE ONE TABLET BY MOUTH

## 2025-02-23 ENCOUNTER — HOSPITAL ENCOUNTER (EMERGENCY)
Age: 70
Discharge: HOME | End: 2025-02-23
Payer: MEDICARE

## 2025-02-23 VITALS
BODY MASS INDEX: 41.47 KG/M2 | HEART RATE: 60 BPM | OXYGEN SATURATION: 96 % | WEIGHT: 280 LBS | SYSTOLIC BLOOD PRESSURE: 94 MMHG | RESPIRATION RATE: 20 BRPM | TEMPERATURE: 96.6 F | DIASTOLIC BLOOD PRESSURE: 70 MMHG | HEIGHT: 69 IN

## 2025-02-23 DIAGNOSIS — S81.812A LACERATION OF LEFT LOWER LEG, INITIAL ENCOUNTER: Primary | ICD-10-CM

## 2025-02-23 PROCEDURE — 12004 RPR S/N/AX/GEN/TRK7.6-12.5CM: CPT

## 2025-02-23 PROCEDURE — 99284 EMERGENCY DEPT VISIT MOD MDM: CPT

## 2025-02-23 PROCEDURE — 90471 IMMUNIZATION ADMIN: CPT | Performed by: PHYSICIAN ASSISTANT

## 2025-02-23 PROCEDURE — 6360000002 HC RX W HCPCS: Performed by: PHYSICIAN ASSISTANT

## 2025-02-23 PROCEDURE — 90715 TDAP VACCINE 7 YRS/> IM: CPT | Performed by: PHYSICIAN ASSISTANT

## 2025-02-23 RX ORDER — 0.9 % SODIUM CHLORIDE 0.9 %
1000 INTRAVENOUS SOLUTION INTRAVENOUS ONCE
Status: DISCONTINUED | OUTPATIENT
Start: 2025-02-23 | End: 2025-02-23 | Stop reason: HOSPADM

## 2025-02-23 RX ORDER — LIDOCAINE HYDROCHLORIDE AND EPINEPHRINE 10; 10 MG/ML; UG/ML
20 INJECTION, SOLUTION INFILTRATION; PERINEURAL ONCE
Status: COMPLETED | OUTPATIENT
Start: 2025-02-23 | End: 2025-02-23

## 2025-02-23 RX ADMIN — LIDOCAINE HYDROCHLORIDE AND EPINEPHRINE 20 ML: 10; 10 INJECTION, SOLUTION INFILTRATION; PERINEURAL at 17:24

## 2025-02-23 RX ADMIN — TETANUS TOXOID, REDUCED DIPHTHERIA TOXOID AND ACELLULAR PERTUSSIS VACCINE, ADSORBED 0.5 ML: 5; 2.5; 8; 8; 2.5 SUSPENSION INTRAMUSCULAR at 16:27

## 2025-02-23 ASSESSMENT — LIFESTYLE VARIABLES
HOW MANY STANDARD DRINKS CONTAINING ALCOHOL DO YOU HAVE ON A TYPICAL DAY: 5 OR 6
HOW OFTEN DO YOU HAVE A DRINK CONTAINING ALCOHOL: 4 OR MORE TIMES A WEEK

## 2025-02-23 ASSESSMENT — PAIN - FUNCTIONAL ASSESSMENT: PAIN_FUNCTIONAL_ASSESSMENT: NONE - DENIES PAIN

## 2025-02-23 NOTE — ED PROVIDER NOTES
[facility]  EMERGENCY DEPARTMENT ENCOUNTER      Pt Name: Kenny Vallejo  MRN: [id number]  Birthdate 1955  Date of evaluation: 2/23/2025  Provider: Pam Bo MD    CHIEF COMPLAINT       Chief Complaint   Patient presents with    Fall     Pt to ED via EMS from home with c/o fall with injury to right leg.  Per report, fell going up the stairs into his home. Pt unsure of what may   have lacerated his right lower leg. Per EMS report, pt appears to have lost  300 cc of blood. Leg is wrapped and bleeding controlled. Pt denies hitting head or losing consciousness.  Pt is on a blood thinner - Brilinta. Pt admits to having \"quite a few\" drinks today at the [employer].    Laceration         HISTORY OF PRESENT ILLNESS      Kenny Vallejo is a 69 y.o. male who presents to the emergency department due to leg laceration.  Prior to arrival to the emergency department the patient tripped and fell while going into his home.  He was intoxicated and he cut his leg on the door jam.  Patient had a large bleeding laceration.  He is on Brilinta.  Bleeding was controlled with a pressure dressing by EMS.  Pain is minimal.  Patient denies any other injury or trauma.        REVIEW OF SYSTEMS       Review of Systems   Constitutional:  Negative for fever.   Respiratory:  Negative for cough and shortness of breath.    Cardiovascular:  Negative for chest pain.         PAST MEDICAL HISTORY     Past Medical History:   Diagnosis Date    CAD (coronary artery disease)     s/p multiple stents     History of DVT (deep vein thrombosis) 1990    right calf DVT    Hyperlipidemia     Hypertension     Morbid obesity          SURGICAL HISTORY       Past Surgical History:   Procedure Laterality Date    CARDIAC CATHETERIZATION Left 04/17/2018    Right radial/[facility]/Dr Wren/95% in stent-restenosis in the proximal LAD with a 95% ostial 95% stenosis in a fairly large D1 branch that appears to be jailed. Normal LVEDP    CARDIAC

## 2025-02-23 NOTE — ED NOTES
Patient refusing all labwork, IV and fluids at this time. Patient refusing to keep monitoring devices on. Patient stating he does not need anything besides his leg put back together. Nisha ROBERTO notified at this time

## 2025-02-23 NOTE — DISCHARGE INSTRUCTIONS
Suture removal in 10 days  
amLODIPine 5 mg oral tablet: 1 tab(s) orally once a day  omeprazole 40 mg oral delayed release capsule: 1 cap(s) orally 2 times a day

## 2025-04-08 ENCOUNTER — HOSPITAL ENCOUNTER (OUTPATIENT)
Age: 70
Discharge: HOME | End: 2025-04-08
Payer: MEDICARE

## 2025-04-08 DIAGNOSIS — R97.20 ELEVATED PSA: ICD-10-CM

## 2025-04-08 LAB
ANION GAP SERPL CALCULATED.3IONS-SCNC: 10 MMOL/L (ref 9–16)
BUN SERPL-MCNC: 10 MG/DL (ref 8–23)
BUN/CREAT SERPL: 13 (ref 9–20)
CALCIUM SERPL-MCNC: 9.3 MG/DL (ref 8.6–10.4)
CHLORIDE SERPL-SCNC: 99 MMOL/L (ref 98–107)
CO2 SERPL-SCNC: 29 MMOL/L (ref 20–31)
CREAT SERPL-MCNC: 0.8 MG/DL (ref 0.7–1.2)
GFR, ESTIMATED: >90 ML/MIN/1.73M2
GLUCOSE SERPL-MCNC: 125 MG/DL (ref 74–99)
POTASSIUM SERPL-SCNC: 4.3 MMOL/L (ref 3.7–5.3)
PSA SERPL-MCNC: 5.01 NG/ML (ref 0–4)
SODIUM SERPL-SCNC: 138 MMOL/L (ref 136–145)

## 2025-04-08 PROCEDURE — 36415 COLL VENOUS BLD VENIPUNCTURE: CPT

## 2025-04-08 PROCEDURE — 80048 BASIC METABOLIC PNL TOTAL CA: CPT

## 2025-04-08 PROCEDURE — 84153 ASSAY OF PSA TOTAL: CPT

## 2025-04-09 ENCOUNTER — RESULTS FOLLOW-UP (OUTPATIENT)
Dept: UROLOGY | Age: 70
End: 2025-04-09

## 2025-04-09 NOTE — TELEPHONE ENCOUNTER
----- Message from Tony Chery PA-C sent at 4/9/2025 11:14 AM EDT -----  Please let him know his PSA did come down to 5.01.  This is lower than it has been recently, we will follow-up with him as scheduled later this year

## 2025-04-09 NOTE — RESULT ENCOUNTER NOTE
Please let him know his PSA did come down to 5.01.  This is lower than it has been recently, we will follow-up with him as scheduled later this year

## 2025-04-09 NOTE — TELEPHONE ENCOUNTER
Tried to contact the patient, left a message on the machine for the patient to return a call to the office.

## 2025-04-09 NOTE — TELEPHONE ENCOUNTER
Phone call to patient, wife answered and results per provider's comment were given to wife per HIPAA form.     Patient's wife vocalized understanding with no further questions.

## 2025-05-12 DIAGNOSIS — I10 ESSENTIAL HYPERTENSION: Chronic | ICD-10-CM

## 2025-05-12 DIAGNOSIS — E66.813 CLASS 3 SEVERE OBESITY WITH BODY MASS INDEX (BMI) OF 40.0 TO 44.9 IN ADULT, UNSPECIFIED OBESITY TYPE, UNSPECIFIED WHETHER SERIOUS COMORBIDITY PRESENT (HCC): ICD-10-CM

## 2025-05-12 DIAGNOSIS — E78.2 MIXED HYPERLIPIDEMIA: Chronic | ICD-10-CM

## 2025-05-12 DIAGNOSIS — I25.110 CORONARY ARTERY DISEASE INVOLVING NATIVE CORONARY ARTERY OF NATIVE HEART WITH UNSTABLE ANGINA PECTORIS (HCC): Chronic | ICD-10-CM

## 2025-05-12 RX ORDER — CARVEDILOL 3.12 MG/1
3.12 TABLET ORAL 2 TIMES DAILY
Qty: 180 TABLET | Refills: 3 | Status: SHIPPED | OUTPATIENT
Start: 2025-05-12

## 2025-07-21 ENCOUNTER — HOSPITAL ENCOUNTER (OUTPATIENT)
Age: 70
Discharge: HOME | End: 2025-07-21
Payer: MEDICARE

## 2025-07-21 DIAGNOSIS — R97.20 ELEVATED PSA: ICD-10-CM

## 2025-07-21 LAB — PSA SERPL-MCNC: 4.48 NG/ML (ref 0–4)

## 2025-07-21 PROCEDURE — 36415 COLL VENOUS BLD VENIPUNCTURE: CPT

## 2025-07-21 PROCEDURE — 84153 ASSAY OF PSA TOTAL: CPT

## 2025-07-23 ENCOUNTER — OFFICE VISIT (OUTPATIENT)
Dept: UROLOGY | Age: 70
End: 2025-07-23
Payer: MEDICARE

## 2025-07-23 VITALS
TEMPERATURE: 97.5 F | HEIGHT: 69 IN | HEART RATE: 53 BPM | BODY MASS INDEX: 40.73 KG/M2 | WEIGHT: 275 LBS | SYSTOLIC BLOOD PRESSURE: 113 MMHG | DIASTOLIC BLOOD PRESSURE: 69 MMHG

## 2025-07-23 DIAGNOSIS — R97.20 ELEVATED PSA: Primary | ICD-10-CM

## 2025-07-23 PROCEDURE — 1159F MED LIST DOCD IN RCRD: CPT | Performed by: PHYSICIAN ASSISTANT

## 2025-07-23 PROCEDURE — 1124F ACP DISCUSS-NO DSCNMKR DOCD: CPT | Performed by: PHYSICIAN ASSISTANT

## 2025-07-23 PROCEDURE — 99214 OFFICE O/P EST MOD 30 MIN: CPT | Performed by: PHYSICIAN ASSISTANT

## 2025-07-23 PROCEDURE — G8417 CALC BMI ABV UP PARAM F/U: HCPCS | Performed by: PHYSICIAN ASSISTANT

## 2025-07-23 PROCEDURE — 3074F SYST BP LT 130 MM HG: CPT | Performed by: PHYSICIAN ASSISTANT

## 2025-07-23 PROCEDURE — G8427 DOCREV CUR MEDS BY ELIG CLIN: HCPCS | Performed by: PHYSICIAN ASSISTANT

## 2025-07-23 PROCEDURE — 3078F DIAST BP <80 MM HG: CPT | Performed by: PHYSICIAN ASSISTANT

## 2025-07-23 PROCEDURE — 1036F TOBACCO NON-USER: CPT | Performed by: PHYSICIAN ASSISTANT

## 2025-07-23 PROCEDURE — 3017F COLORECTAL CA SCREEN DOC REV: CPT | Performed by: PHYSICIAN ASSISTANT

## 2025-07-23 NOTE — PROGRESS NOTES
HPI:      Patient is a 70 y.o. male in no acute distress.  He is alert and oriented to person, place, and time.       History  1/2016 Referred for elevated PSA 5.35. 1 week prior to getting the PSA he fell and broke his tailbone. Has no family history of prostate cancer, breast cancer, or ovarian cancer.     7/2021 prostate biopsy for PSA of 6.73. Pathology: all 12 cores negative for malignancy     9/2022 Confirm MDX negative      PSA  7/2025 - 4.48  4/2025 - 5.01  7/2024 - 7.6  8/2023 - 7.77  6/2023 - 7.54  12/2022 - 6.42  9/2022 - 8.25  8/2022 - 13.79  2/2022 - 5.39  7/2021 - 6.73  5/2021 - 6.33  11/2020 - 4.97  11/2019 - 4.05  8/2018 - 3.83  11/2017 - 3.54  5/2017 - 4.72  3/2017 - 5.70  5/2016 - 3.67  1/2016 - 5.35  9/2013 - 1.71     Today  Patient is here today for follow-up elevated PSA.  Patient did have a PSA prior to visit today which was 4.48. Patient's highest PSA in the past was 13.79.  Patient did have a prostate biopsy approximately 3 years ago for a PSA of 6.73.  Confirm MDX was also negative.  Patient is over the age of 68. He denies unintentional weight loss, decreased energy or appetite, new or worsening bone/hip/back pain. He denies any lower extremity numbness and tingling.  He has a daily bowel movement.  He denies any fever, chills, gross hematuria, flank pain, dysuria.    Past Medical History:   Diagnosis Date    CAD (coronary artery disease)     s/p multiple stents     History of DVT (deep vein thrombosis) 1990    right calf DVT    Hyperlipidemia     Hypertension     Morbid obesity (HCC)      Past Surgical History:   Procedure Laterality Date    CARDIAC CATHETERIZATION Left 04/17/2018    Right radial/[employer] [facility]/Dr Wren/95% in stent-restenosis in the proximal LAD with a 95% ostial 95% stenosis in a fairly large D1 branch that appears to be jailed. Normal LVEDP    CARDIAC CATHETERIZATION Left 03/09/2022    diagnostic via right radial approach/ [facility]/ Dr. Sg Wren

## (undated) DEVICE — KNIFE OPHTH DIA22MM 45DEG SLT W HNDL SHRP ANG PNT DEL DBL

## (undated) DEVICE — Device

## (undated) DEVICE — GLOVE SURG SZ 65 CRM LTX FREE POLYISOPRENE POLYMER BEAD ANTI

## (undated) DEVICE — BETADINE 5% EYE SOL

## (undated) DEVICE — SOLUTION IV IRRIG POUR BRL 0.9% SODIUM CHL 2F7124

## (undated) DEVICE — SOLUTION IRRIG 1000ML STRL H2O USP PLAS POUR BTL

## (undated) DEVICE — CANNULA ORAL NSL AD CO2 N INTUB O2 DEL DISP TRU LNK

## (undated) DEVICE — GLOVE SURG SZ 7 CRM LTX FREE POLYISOPRENE POLYMER BEAD ANTI

## (undated) DEVICE — SINGLE USE MEDICAL DEVICE FOR OPHTHALMIC SURGERY: Brand: SIL. COATED I/A 45 STELLARIS 12/B

## (undated) DEVICE — SOFT SHIELD® COLLAGEN SHIELD, 12 HOURS (CE): Brand: SOFT SHIELD® COLLAGEN SHIELDS

## (undated) DEVICE — AMVISC PLUS  0.8ML: Brand: AMVISC PLUS

## (undated) DEVICE — SOLUTION IRRIG 1000ML 0.9% SOD CHL USP POUR PLAS BTL

## (undated) DEVICE — MEDI-VAC NON-CONDUCTIVE TUBING7MM X 30.5 (100FT): Brand: CARDINAL HEALTH

## (undated) DEVICE — FORCEP BX JUMBO 4 2.8 MMX240 CM 3.2 MM OVL CUP RADIAL JAW